# Patient Record
Sex: FEMALE | Race: WHITE | NOT HISPANIC OR LATINO | Employment: FULL TIME | URBAN - METROPOLITAN AREA
[De-identification: names, ages, dates, MRNs, and addresses within clinical notes are randomized per-mention and may not be internally consistent; named-entity substitution may affect disease eponyms.]

---

## 2018-05-14 ENCOUNTER — HOSPITAL ENCOUNTER (EMERGENCY)
Facility: HOSPITAL | Age: 58
Discharge: HOME | End: 2018-05-14
Attending: EMERGENCY MEDICINE

## 2018-05-14 ENCOUNTER — APPOINTMENT (EMERGENCY)
Dept: RADIOLOGY | Facility: HOSPITAL | Age: 58
End: 2018-05-14

## 2018-05-14 VITALS
HEART RATE: 48 BPM | RESPIRATION RATE: 24 BRPM | BODY MASS INDEX: 21.97 KG/M2 | DIASTOLIC BLOOD PRESSURE: 58 MMHG | HEIGHT: 67 IN | SYSTOLIC BLOOD PRESSURE: 120 MMHG | WEIGHT: 140 LBS | OXYGEN SATURATION: 97 % | TEMPERATURE: 98.2 F

## 2018-05-14 DIAGNOSIS — J40 BRONCHITIS: Primary | ICD-10-CM

## 2018-05-14 LAB
ALBUMIN SERPL BCP-MCNC: 3.8 G/DL (ref 3.5–5)
ALP SERPL-CCNC: 92 U/L (ref 46–116)
ALT SERPL W P-5'-P-CCNC: 13 U/L (ref 12–78)
ANION GAP SERPL CALCULATED.3IONS-SCNC: 10 MMOL/L (ref 4–13)
AST SERPL W P-5'-P-CCNC: 22 U/L (ref 5–45)
BACTERIA UR QL AUTO: NORMAL /HPF
BASOPHILS # BLD AUTO: 0.03 THOUSANDS/ΜL (ref 0–0.1)
BASOPHILS NFR BLD AUTO: 0 % (ref 0–1)
BILIRUB SERPL-MCNC: 0.6 MG/DL (ref 0.2–1)
BILIRUB UR QL STRIP: NEGATIVE
BUN SERPL-MCNC: 10 MG/DL (ref 5–25)
CALCIUM SERPL-MCNC: 8.9 MG/DL (ref 8.3–10.1)
CHLORIDE SERPL-SCNC: 103 MMOL/L (ref 100–108)
CLARITY UR: CLEAR
CO2 SERPL-SCNC: 26 MMOL/L (ref 21–32)
COLOR UR: YELLOW
CREAT SERPL-MCNC: 0.78 MG/DL (ref 0.6–1.3)
DEPRECATED D DIMER PPP: 352 NG/ML (FEU) (ref 0–424)
EOSINOPHIL # BLD AUTO: 0.1 THOUSAND/ΜL (ref 0–0.61)
EOSINOPHIL NFR BLD AUTO: 2 % (ref 0–6)
ERYTHROCYTE [DISTWIDTH] IN BLOOD BY AUTOMATED COUNT: 12.8 % (ref 11.6–15.1)
GFR SERPL CREATININE-BSD FRML MDRD: 83 ML/MIN/1.73SQ M
GLUCOSE SERPL-MCNC: 101 MG/DL (ref 65–140)
GLUCOSE UR STRIP-MCNC: NEGATIVE MG/DL
HCT VFR BLD AUTO: 46 % (ref 34.8–46.1)
HGB BLD-MCNC: 15.6 G/DL (ref 11.5–15.4)
HGB UR QL STRIP.AUTO: ABNORMAL
KETONES UR STRIP-MCNC: NEGATIVE MG/DL
LEUKOCYTE ESTERASE UR QL STRIP: NEGATIVE
LYMPHOCYTES # BLD AUTO: 1.43 THOUSANDS/ΜL (ref 0.6–4.47)
LYMPHOCYTES NFR BLD AUTO: 21 % (ref 14–44)
MCH RBC QN AUTO: 30.1 PG (ref 26.8–34.3)
MCHC RBC AUTO-ENTMCNC: 33.9 G/DL (ref 31.4–37.4)
MCV RBC AUTO: 89 FL (ref 82–98)
MONOCYTES # BLD AUTO: 0.56 THOUSAND/ΜL (ref 0.17–1.22)
MONOCYTES NFR BLD AUTO: 8 % (ref 4–12)
NEUTROPHILS # BLD AUTO: 4.57 THOUSANDS/ΜL (ref 1.85–7.62)
NEUTS SEG NFR BLD AUTO: 69 % (ref 43–75)
NITRITE UR QL STRIP: NEGATIVE
NON-SQ EPI CELLS URNS QL MICRO: NORMAL /HPF
NT-PROBNP SERPL-MCNC: 144 PG/ML
PH UR STRIP.AUTO: 6.5 [PH] (ref 4.5–8)
PLATELET # BLD AUTO: 227 THOUSANDS/UL (ref 149–390)
PMV BLD AUTO: 10.5 FL (ref 8.9–12.7)
POTASSIUM SERPL-SCNC: 4.3 MMOL/L (ref 3.5–5.3)
PROT SERPL-MCNC: 7.5 G/DL (ref 6.4–8.2)
PROT UR STRIP-MCNC: NEGATIVE MG/DL
RBC # BLD AUTO: 5.18 MILLION/UL (ref 3.81–5.12)
RBC #/AREA URNS AUTO: NORMAL /HPF
SODIUM SERPL-SCNC: 139 MMOL/L (ref 136–145)
SP GR UR STRIP.AUTO: <=1.005 (ref 1–1.03)
TROPONIN I SERPL-MCNC: <0.02 NG/ML
TROPONIN I SERPL-MCNC: <0.02 NG/ML
UROBILINOGEN UR QL STRIP.AUTO: 0.2 E.U./DL
WBC # BLD AUTO: 6.69 THOUSAND/UL (ref 4.31–10.16)
WBC #/AREA URNS AUTO: NORMAL /HPF

## 2018-05-14 PROCEDURE — 71046 X-RAY EXAM CHEST 2 VIEWS: CPT

## 2018-05-14 PROCEDURE — 36415 COLL VENOUS BLD VENIPUNCTURE: CPT | Performed by: EMERGENCY MEDICINE

## 2018-05-14 PROCEDURE — 93005 ELECTROCARDIOGRAM TRACING: CPT

## 2018-05-14 PROCEDURE — 80053 COMPREHEN METABOLIC PANEL: CPT | Performed by: EMERGENCY MEDICINE

## 2018-05-14 PROCEDURE — 81001 URINALYSIS AUTO W/SCOPE: CPT | Performed by: EMERGENCY MEDICINE

## 2018-05-14 PROCEDURE — 84484 ASSAY OF TROPONIN QUANT: CPT | Performed by: EMERGENCY MEDICINE

## 2018-05-14 PROCEDURE — 83880 ASSAY OF NATRIURETIC PEPTIDE: CPT | Performed by: EMERGENCY MEDICINE

## 2018-05-14 PROCEDURE — 85025 COMPLETE CBC W/AUTO DIFF WBC: CPT | Performed by: EMERGENCY MEDICINE

## 2018-05-14 PROCEDURE — 94640 AIRWAY INHALATION TREATMENT: CPT

## 2018-05-14 PROCEDURE — 85379 FIBRIN DEGRADATION QUANT: CPT | Performed by: EMERGENCY MEDICINE

## 2018-05-14 PROCEDURE — 99285 EMERGENCY DEPT VISIT HI MDM: CPT

## 2018-05-14 RX ORDER — IPRATROPIUM BROMIDE AND ALBUTEROL SULFATE 2.5; .5 MG/3ML; MG/3ML
3 SOLUTION RESPIRATORY (INHALATION) ONCE
Status: COMPLETED | OUTPATIENT
Start: 2018-05-14 | End: 2018-05-14

## 2018-05-14 RX ORDER — ALBUTEROL SULFATE 2.5 MG/3ML
5 SOLUTION RESPIRATORY (INHALATION) ONCE
Status: COMPLETED | OUTPATIENT
Start: 2018-05-14 | End: 2018-05-14

## 2018-05-14 RX ORDER — ALBUTEROL SULFATE 90 UG/1
2 AEROSOL, METERED RESPIRATORY (INHALATION) EVERY 4 HOURS PRN
Qty: 1 INHALER | Refills: 0 | Status: SHIPPED | OUTPATIENT
Start: 2018-05-14 | End: 2019-12-02 | Stop reason: SDDI

## 2018-05-14 RX ORDER — ASPIRIN 81 MG/1
324 TABLET, CHEWABLE ORAL ONCE
Status: COMPLETED | OUTPATIENT
Start: 2018-05-14 | End: 2018-05-14

## 2018-05-14 RX ORDER — SODIUM CHLORIDE FOR INHALATION 0.9 %
VIAL, NEBULIZER (ML) INHALATION
Status: COMPLETED
Start: 2018-05-14 | End: 2018-05-14

## 2018-05-14 RX ORDER — LORATADINE 10 MG/1
10 TABLET ORAL DAILY
Qty: 20 TABLET | Refills: 0 | Status: SHIPPED | OUTPATIENT
Start: 2018-05-14 | End: 2019-12-02 | Stop reason: ALTCHOICE

## 2018-05-14 RX ORDER — LORATADINE 10 MG/1
10 TABLET ORAL ONCE
Status: COMPLETED | OUTPATIENT
Start: 2018-05-14 | End: 2018-05-14

## 2018-05-14 RX ADMIN — ISODIUM CHLORIDE 3 ML: 0.03 SOLUTION RESPIRATORY (INHALATION) at 07:20

## 2018-05-14 RX ADMIN — IPRATROPIUM BROMIDE AND ALBUTEROL SULFATE 3 ML: 2.5; .5 SOLUTION RESPIRATORY (INHALATION) at 07:20

## 2018-05-14 RX ADMIN — ALBUTEROL SULFATE 5 MG: 2.5 SOLUTION RESPIRATORY (INHALATION) at 10:39

## 2018-05-14 RX ADMIN — LORATADINE 10 MG: 10 TABLET ORAL at 10:39

## 2018-05-14 RX ADMIN — ASPIRIN 81 MG 324 MG: 81 TABLET ORAL at 07:19

## 2018-05-14 NOTE — DISCHARGE INSTRUCTIONS
Acute Bronchitis   WHAT YOU NEED TO KNOW:   Acute bronchitis is swelling and irritation in the air passages of your lungs  This irritation may cause you to cough or have other breathing problems  Acute bronchitis often starts because of another illness, such as a cold or the flu  The illness spreads from your nose and throat to your windpipe and airways  Bronchitis is often called a chest cold  Acute bronchitis lasts about 3 to 6 weeks and is usually not a serious illness  Your cough can last for several weeks  DISCHARGE INSTRUCTIONS:   Return to the emergency department if:   · You cough up blood  · Your lips or fingernails turn blue  · You feel like you are not getting enough air when you breathe  Contact your healthcare provider if:   · You have a fever  · Your breathing problems do not go away or get worse  · Your cough does not get better within 4 weeks  · You have questions or concerns about your condition or care  Self-care:   · Get more rest   Rest helps your body to heal  Slowly start to do more each day  Rest when you feel it is needed  · Avoid irritants in the air  Avoid chemicals, fumes, and dust  Wear a face mask if you must work around dust or fumes  Stay inside on days when air pollution levels are high  If you have allergies, stay inside when pollen counts are high  Do not use aerosol products, such as spray-on deodorant, bug spray, and hair spray  · Do not smoke or be around others who smoke  Nicotine and other chemicals in cigarettes and cigars damages the cilia that move mucus out of your lungs  Ask your healthcare provider for information if you currently smoke and need help to quit  E-cigarettes or smokeless tobacco still contain nicotine  Talk to your healthcare provider before you use these products  · Drink liquids as directed  Liquids help keep your air passages moist and help you cough up mucus   You may need to drink more liquids when you have acute bronchitis  Ask how much liquid to drink each day and which liquids are best for you  · Use a humidifier or vaporizer  Use a cool mist humidifier or a vaporizer to increase air moisture in your home  This may make it easier for you to breathe and help decrease your cough  Decrease risk for acute bronchitis:   · Get the vaccinations you need  Ask your healthcare provider if you should get vaccinated against the flu or pneumonia  · Prevent the spread of germs  You can decrease your risk of acute bronchitis and other illnesses by doing the following:     [facility] your hands often with soap and water  Carry germ-killing hand lotion or gel with you  You can use the lotion or gel to clean your hands when soap and water are not available  ¨ Do not touch your eyes, nose, or mouth unless you have washed your hands first     ¨ Always cover your mouth when you cough to prevent the spread of germs  It is best to cough into a tissue or your shirt sleeve instead of into your hand  Ask those around you cover their mouths when they cough  ¨ Try to avoid people who have a cold or the flu  If you are sick, stay away from others as much as possible  Medicines: Your healthcare provider may  give you any of the following:  · Ibuprofen or acetaminophen  are medicines that help lower your fever  They are available without a doctor's order  Ask your healthcare provider which medicine is right for you  Ask how much to take and how often to take it  Follow directions  These medicines can cause stomach bleeding if not taken correctly  Ibuprofen can cause kidney damage  Do not take ibuprofen if you have kidney disease, an ulcer, or allergies to aspirin  Acetaminophen can cause liver damage  Do not take more than 4,000 milligrams in 24 hours  · Decongestants  help loosen mucus in your lungs and make it easier to cough up  This can help you breathe easier  · Cough suppressants  decrease your urge to cough   If your cough produces mucus, do not take a cough suppressant unless your healthcare provider tells you to  Your healthcare provider may suggest that you take a cough suppressant at night so you can rest     · Inhalers  may be given  Your healthcare provider may give you one or more inhalers to help you breathe easier and cough less  An inhaler gives your medicine to open your airways  Ask your healthcare provider to show you how to use your inhaler correctly  · Take your medicine as directed  Contact your healthcare provider if you think your medicine is not helping or if you have side effects  Tell him of her if you are allergic to any medicine  Keep a list of the medicines, vitamins, and herbs you take  Include the amounts, and when and why you take them  Bring the list or the pill bottles to follow-up visits  Carry your medicine list with you in case of an emergency  Follow up with your healthcare provider as directed:  Write down questions you have so you will remember to ask them during your follow-up visits  © 2017 [employer] Information is for End User's use only and may not be sold, redistributed or otherwise used for commercial purposes  All illustrations and images included in CareNotes® are the copyrighted property of [employer]  or Fredy Medeiros  The above information is an  only  It is not intended as medical advice for individual conditions or treatments  Talk to your doctor, nurse or pharmacist before following any medical regimen to see if it is safe and effective for you  Cigarette Smoking and Your Health   WHAT YOU NEED TO KNOW:   What are the risks to my health if I smoke tobacco?  Nicotine and other chemicals found in tobacco damage every cell in your body  Even if you are a light smoker, you have an increased risk for cancer, heart disease, and lung disease  If you are pregnant or have diabetes, smoking increases your risk for complications  What are the benefits to my health if I stop smoking? · You decrease respiratory symptoms such as coughing, wheezing, and shortness of breath  · You reduce your risk for cancers of the lung, mouth, throat, kidney, bladder, pancreas, stomach, and cervix  If you already have cancer, you increase the benefits of chemotherapy  You also reduce your risk for cancer returning or a second cancer from developing  · You reduce your risk for heart disease, blood clots, heart attack, and stroke  · You reduce your risk for lung infections, and diseases such as pneumonia, asthma, chronic bronchitis, and emphysema  · Your circulation improves  More oxygen can be delivered to your body  If you have diabetes, you lower your risk for complications, such as kidney, artery, and eye diseases  You also lower your risk for nerve damage  Nerve damage can lead to amputations, poor vision, and blindness  · You improve your body's ability to heal and to fight infections  What are the health benefits to others if I stop smoking? Tobacco is harmful to nonsmokers who breathe in your secondhand smoke  The following are ways the health of others around you may improve when you stop smoking:  · You lower the risks for lung cancer and heart disease in nonsmoking adults  · If you are pregnant, you lower the risk for miscarriage, early delivery, low birth weight, and stillbirth  You also lower your baby's risk for SIDS, obesity, developmental delay, and neurobehavioral problems, such as ADHD  · If you have children, you lower their risk for ear infections, colds, pneumonia, bronchitis, and asthma  Where can I find more information and support to stop smoking? · Locality  Phone: [phone]  Web Address: www [employer]  CARE AGREEMENT:   You have the right to help plan your care  Learn about your health condition and how it may be treated   Discuss treatment options with your caregivers to decide what care you want to receive  You always have the right to refuse treatment  The above information is an  only  It is not intended as medical advice for individual conditions or treatments  Talk to your doctor, nurse or pharmacist before following any medical regimen to see if it is safe and effective for you  © 2017 [employer] Information is for End User's use only and may not be sold, redistributed or otherwise used for commercial purposes  All illustrations and images included in CareNotes® are the copyrighted property of [employer]  or Fredy Medeiros

## 2018-05-14 NOTE — ED PROVIDER NOTES
History  Chief Complaint   Patient presents with    Chest Pain     patient presents to ED c/o of chest pain and coughing x 1 week  Woke up this am and felt like she had a pressure on her chest      Patient complains of dry cough short of breath 1 week duration then woke this morning with squeezing chest pain around ribs  No specific exacerbating or relieving factors  Did not take any medications yet  History provided by:  Patient  Chest Pain   Pain location:  L lateral chest and R lateral chest  Pain quality: pressure    Pain severity:  Mild  Onset quality:  Sudden  Timing:  Constant  Chronicity:  New  Context: breathing    Relieved by:  Nothing  Worsened by:  Nothing tried  Associated symptoms: shortness of breath    Associated symptoms: no fever, no nausea and not vomiting    Risk factors: smoking        None       History reviewed  No pertinent past medical history  History reviewed  No pertinent surgical history  History reviewed  No pertinent family history  I have reviewed and agree with the history as documented  Social History   Substance Use Topics    Smoking status: Current Every Day Smoker    Smokeless tobacco: Never Used    Alcohol use No        Review of Systems   Constitutional: Negative for chills and fever  HENT: Negative for rhinorrhea and sore throat  Respiratory: Positive for shortness of breath  Cardiovascular: Positive for chest pain  Gastrointestinal: Positive for constipation  Negative for diarrhea, nausea and vomiting  Genitourinary: Positive for dysuria, frequency and urgency  Skin: Negative for rash  All other systems reviewed and are negative        Physical Exam  ED Triage Vitals   Temperature Pulse Respirations Blood Pressure SpO2   05/14/18 0717 05/14/18 0702 05/14/18 0702 05/14/18 0702 05/14/18 0702   98 2 °F (36 8 °C) 64 20 (!) 199/92 98 %      Temp Source Heart Rate Source Patient Position - Orthostatic VS BP Location FiO2 (%)   05/14/18 0717 -- 05/14/18 0702 05/14/18 0702 --   Temporal  Sitting Left arm       Pain Score       --                  Orthostatic Vital Signs  Vitals:    05/14/18 0930 05/14/18 0945 05/14/18 1000 05/14/18 1015   BP:    120/58   Pulse: (!) 47 (!) 47 56 (!) 48   Patient Position - Orthostatic VS:           Physical Exam   Constitutional: She appears well-developed and well-nourished  HENT:   Mouth/Throat: Oropharynx is clear and moist    Eyes: Conjunctivae and EOM are normal  Pupils are equal, round, and reactive to light  Neck: Normal range of motion  Neck supple  No spinous process tenderness present  Cardiovascular: Normal rate, regular rhythm, normal heart sounds and intact distal pulses  Pulmonary/Chest: Effort normal and breath sounds normal  No respiratory distress  She has no wheezes  Abdominal: Soft  Bowel sounds are normal  She exhibits no distension  There is no tenderness  Musculoskeletal: Normal range of motion  Neurological: She is alert  She has normal strength  No sensory deficit  GCS eye subscore is 4  GCS verbal subscore is 5  GCS motor subscore is 6  Skin: Skin is warm and dry  No rash noted  Psychiatric: She has a normal mood and affect  Nursing note and vitals reviewed        ED Medications  Medications   aspirin chewable tablet 324 mg (324 mg Oral Given 5/14/18 0719)   ipratropium-albuterol (DUO-NEB) 0 5-2 5 mg/3 mL inhalation solution 3 mL (3 mL Nebulization Given 5/14/18 0720)   sodium chloride 0 9 % inhalation solution **AcuDose Override Pull** (3 mL  Given 5/14/18 0720)   loratadine (CLARITIN) tablet 10 mg (10 mg Oral Given 5/14/18 1039)   albuterol inhalation solution 5 mg (5 mg Nebulization Given 5/14/18 1039)       Diagnostic Studies  Results Reviewed     Procedure Component Value Units Date/Time    Troponin I [[id number]]  (Normal) Collected:  05/14/18 1003    Lab Status:  Final result Specimen:  Blood from Arm, Right Updated:  05/14/18 1032     Troponin I <0 02 ng/mL     Narrative: Siemens Chemistry analyzer 99% cutoff is > 0 04 ng/mL in network labs    o cTnI 99% cutoff is useful only when applied to patients in the clinical setting of myocardial ischemia  o cTnI 99% cutoff should be interpreted in the context of clinical history, ECG findings and possibly cardiac imaging to establish correct diagnosis  o cTnI 99% cutoff may be suggestive but clearly not indicative of a coronary event without the clinical setting of myocardial ischemia      Urine Microscopic [[id number]]  (Normal) Collected:  05/14/18 0827    Lab Status:  Final result Specimen:  Urine from Urine, Clean Catch Updated:  05/14/18 0845     RBC, UA None Seen /hpf      WBC, UA None Seen /hpf      Epithelial Cells Occasional /hpf      Bacteria, UA None Seen /hpf     UA w Reflex to Microscopic w Reflex to Culture [[id number]]  (Abnormal) Collected:  05/14/18 0827    Lab Status:  Final result Specimen:  Urine from Urine, Clean Catch Updated:  05/14/18 0838     Color, UA Yellow     Clarity, UA Clear     Specific Gravity, UA <=1 005     pH, UA 6 5     Leukocytes, UA Negative     Nitrite, UA Negative     Protein, UA Negative mg/dl      Glucose, UA Negative mg/dl      Ketones, UA Negative mg/dl      Urobilinogen, UA 0 2 E U /dl      Bilirubin, UA Negative     Blood, UA Trace-lysed (A)    D-dimer, quantitative [[id number]]  (Normal) Collected:  05/14/18 0720    Lab Status:  Final result Specimen:  Blood from Arm, Right Updated:  05/14/18 0830     D-Dimer, Quant 352 ng/ml (FEU)     B-type natriuretic peptide [[id number]]  (Abnormal) Collected:  05/14/18 0706    Lab Status:  Final result Specimen:  Blood from Arm, Right Updated:  05/14/18 0752     NT-proBNP 144 (H) pg/mL     Troponin I [[id number]]  (Normal) Collected:  05/14/18 0706    Lab Status:  Final result Specimen:  Blood from Arm, Right Updated:  05/14/18 0749     Troponin I <0 02 ng/mL     Narrative:         Siemens Chemistry analyzer 99% cutoff is > 0 04 ng/mL in network labs    o cTnI 99% cutoff is useful only when applied to patients in the clinical setting of myocardial ischemia  o cTnI 99% cutoff should be interpreted in the context of clinical history, ECG findings and possibly cardiac imaging to establish correct diagnosis  o cTnI 99% cutoff may be suggestive but clearly not indicative of a coronary event without the clinical setting of myocardial ischemia  Comprehensive metabolic panel [[id number]] Collected:  05/14/18 0706    Lab Status:  Final result Specimen:  Blood from Arm, Right Updated:  05/14/18 0747     Sodium 139 mmol/L      Potassium 4 3 mmol/L      Chloride 103 mmol/L      CO2 26 mmol/L      Anion Gap 10 mmol/L      BUN 10 mg/dL      Creatinine 0 78 mg/dL      Glucose 101 mg/dL      Calcium 8 9 mg/dL      AST 22 U/L      ALT 13 U/L      Alkaline Phosphatase 92 U/L      Total Protein 7 5 g/dL      Albumin 3 8 g/dL      Total Bilirubin 0 60 mg/dL      eGFR 83 ml/min/1 73sq m     Narrative:         National Kidney Disease Education Program recommendations are as follows:  GFR calculation is accurate only with a steady state creatinine  Chronic Kidney disease less than 60 ml/min/1 73 sq  meters  Kidney failure less than 15 ml/min/1 73 sq  meters      CBC and differential [[id number]]  (Abnormal) Collected:  05/14/18 0706    Lab Status:  Final result Specimen:  Blood from Arm, Right Updated:  05/14/18 0733     WBC 6 69 Thousand/uL      RBC 5 18 (H) Million/uL      Hemoglobin 15 6 (H) g/dL      Hematocrit 46 0 %      MCV 89 fL      MCH 30 1 pg      MCHC 33 9 g/dL      RDW 12 8 %      MPV 10 5 fL      Platelets 178 Thousands/uL      Neutrophils Relative 69 %      Lymphocytes Relative 21 %      Monocytes Relative 8 %      Eosinophils Relative 2 %      Basophils Relative 0 %      Neutrophils Absolute 4 57 Thousands/µL      Lymphocytes Absolute 1 43 Thousands/µL      Monocytes Absolute 0 56 Thousand/µL      Eosinophils Absolute 0 10 Thousand/µL      Basophils Absolute 0 03 Thousands/µL                  X-ray chest 2 views   ED Interpretation by Ector Ashton DO (05/14 8385)   hyperinflation      Final Result by Chayo Brooks MD (05/14 4152)   Hyperinflation suspicious for COPD      No acute cardiopulmonary disease  Workstation performed: [id number]                    Procedures  ECG 12 Lead Documentation  Date/Time: 5/14/2018 7:23 AM  Performed by: Ame Galeas by: Sheryl Huitron     Indications / Diagnosis:  Chest pain  ECG reviewed by me, the ED Provider: yes    Patient location:  ED  Previous ECG:     Previous ECG:  Unavailable  Interpretation:     Interpretation: normal    Rate:     ECG rate:  64    ECG rate assessment: normal    Rhythm:     Rhythm: sinus rhythm    Ectopy:     Ectopy: none    QRS:     QRS axis:  Normal    QRS intervals:  Normal  Conduction:     Conduction: normal    ST segments:     ST segments:  Normal  T waves:     T waves: normal             Phone Contacts  ED Phone Contact    ED Course  ED Course as of May 14 2047   Mon May 14, 2018   0739 Smoking cessation counseling provided - suggested nicotine gum instead of patch    0750 Patient feels better after breathing treatment    0845 BP decreased to 135/65  Will do repeat troponin at 3 hours to confirm not cardiac episode    More likely allergic bronchitis          HEART Risk Score      Most Recent Value   History  1 Filed at: 05/14/2018 0719   ECG  0 Filed at: 05/14/2018 0719   Age  1 Filed at: 05/14/2018 0719   Risk Factors  1 Filed at: 05/14/2018 0719   Troponin  0 Filed at: 05/14/2018 0719   Heart Score Risk Calculator   History  1 Filed at: 05/14/2018 0719   ECG  0 Filed at: 05/14/2018 0719   Age  1 Filed at: 05/14/2018 0719   Risk Factors  1 Filed at: 05/14/2018 0719   Troponin  0 Filed at: 05/14/2018 0719   HEART Score  3 Filed at: 05/14/2018 0719   HEART Score  3 Filed at: 05/14/2018 7291                      Wells' Criteria for PE      Most Recent Value   Wells' Criteria for PE Clinical signs and symptoms of DVT  0 Filed at: 05/14/2018 6432   PE is primary diagnosis or equally likely  0 Filed at: 05/14/2018 0721   HR >100  0 Filed at: 05/14/2018 0721   Immobilization at least 3 days or Surgery in the previous 4 weeks  0 Filed at: 05/14/2018 0955   Previous, objectively diagnosed PE or DVT  0 Filed at: 05/14/2018 0721   Hemoptysis  0 Filed at: 05/14/2018 7134   Malignancy with treatment within 6 months or palliative  0 Filed at: 05/14/2018 2688   Wells' Criteria Total  0 Filed at: 05/14/2018 1091            MDM  Number of Diagnoses or Management Options  Bronchitis: new and requires workup     Amount and/or Complexity of Data Reviewed  Clinical lab tests: ordered and reviewed  Tests in the radiology section of CPT®: ordered and reviewed    Patient Progress  Patient progress: improved    CritCare Time    Disposition  Final diagnoses:   Bronchitis     Time reflects when diagnosis was documented in both MDM as applicable and the Disposition within this note     Time User Action Codes Description Comment    5/14/2018 10:37 AM Adilson [address] [place] [address] Bronchitis       ED Disposition     ED Disposition Condition Comment    Discharge  Aldair Padilla discharge to home/self care  Condition at discharge: Good        Follow-up Information     Follow up With Specialties Details Why Ravi Mendez MD Family Medicine In 7 days As needed [address]          Discharge Medication List as of 5/14/2018 10:38 AM      START taking these medications    Details   albuterol (PROVENTIL HFA,VENTOLIN HFA) 90 mcg/act inhaler Inhale 2 puffs every 4 (four) hours as needed for wheezing, Starting Mon 5/14/2018, Normal      loratadine (CLARITIN) 10 mg tablet Take 1 tablet (10 mg total) by mouth daily, Starting Mon 5/14/2018, Normal           No discharge procedures on file      ED Provider  Electronically Signed by           Mayo Spring DO  05/14/18 2047

## 2018-05-15 LAB
ATRIAL RATE: 64 BPM
P AXIS: 80 DEGREES
PR INTERVAL: 156 MS
QRS AXIS: 24 DEGREES
QRSD INTERVAL: 84 MS
QT INTERVAL: 400 MS
QTC INTERVAL: 412 MS
T WAVE AXIS: 65 DEGREES
VENTRICULAR RATE: 64 BPM

## 2018-05-15 PROCEDURE — 93010 ELECTROCARDIOGRAM REPORT: CPT | Performed by: INTERNAL MEDICINE

## 2019-12-02 ENCOUNTER — APPOINTMENT (EMERGENCY)
Dept: RADIOLOGY | Facility: HOSPITAL | Age: 59
DRG: 315 | End: 2019-12-02
Payer: COMMERCIAL

## 2019-12-02 ENCOUNTER — HOSPITAL ENCOUNTER (INPATIENT)
Facility: HOSPITAL | Age: 59
LOS: 3 days | Discharge: HOME | DRG: 315 | End: 2019-12-06
Attending: EMERGENCY MEDICINE | Admitting: INTERNAL MEDICINE
Payer: COMMERCIAL

## 2019-12-02 DIAGNOSIS — M75.52 SUBDELTOID BURSITIS OF LEFT SHOULDER JOINT: ICD-10-CM

## 2019-12-02 DIAGNOSIS — M00.9 PYOGENIC ARTHRITIS OF LEFT SHOULDER REGION, DUE TO UNSPECIFIED ORGANISM (HCC): ICD-10-CM

## 2019-12-02 DIAGNOSIS — M00.9 JOINT INFECTION (HCC): ICD-10-CM

## 2019-12-02 DIAGNOSIS — Z72.0 TOBACCO USE: ICD-10-CM

## 2019-12-02 DIAGNOSIS — M75.52 SUBACROMIAL BURSITIS OF LEFT SHOULDER JOINT: Primary | ICD-10-CM

## 2019-12-02 LAB
ANION GAP SERPL CALCULATED.3IONS-SCNC: 11 MMOL/L (ref 4–13)
BASOPHILS # BLD AUTO: 0.04 THOUSANDS/ΜL (ref 0–0.1)
BASOPHILS NFR BLD AUTO: 0 % (ref 0–1)
BUN SERPL-MCNC: 23 MG/DL (ref 5–25)
CALCIUM SERPL-MCNC: 9.2 MG/DL (ref 8.3–10.1)
CHLORIDE SERPL-SCNC: 103 MMOL/L (ref 100–108)
CK SERPL-CCNC: 68 U/L (ref 26–192)
CO2 SERPL-SCNC: 26 MMOL/L (ref 21–32)
CREAT SERPL-MCNC: 1.11 MG/DL (ref 0.6–1.3)
CRP SERPL QL: 11.3 MG/L
EOSINOPHIL # BLD AUTO: 0.12 THOUSAND/ΜL (ref 0–0.61)
EOSINOPHIL NFR BLD AUTO: 1 % (ref 0–6)
ERYTHROCYTE [DISTWIDTH] IN BLOOD BY AUTOMATED COUNT: 12.8 % (ref 11.6–15.1)
GFR SERPL CREATININE-BSD FRML MDRD: 54 ML/MIN/1.73SQ M
GLUCOSE SERPL-MCNC: 102 MG/DL (ref 65–140)
HCT VFR BLD AUTO: 41.8 % (ref 34.8–46.1)
HGB BLD-MCNC: 13.9 G/DL (ref 11.5–15.4)
IMM GRANULOCYTES # BLD AUTO: 0.03 THOUSAND/UL (ref 0–0.2)
IMM GRANULOCYTES NFR BLD AUTO: 0 % (ref 0–2)
LYMPHOCYTES # BLD AUTO: 1.46 THOUSANDS/ΜL (ref 0.6–4.47)
LYMPHOCYTES NFR BLD AUTO: 11 % (ref 14–44)
MCH RBC QN AUTO: 29.8 PG (ref 26.8–34.3)
MCHC RBC AUTO-ENTMCNC: 33.3 G/DL (ref 31.4–37.4)
MCV RBC AUTO: 90 FL (ref 82–98)
MONOCYTES # BLD AUTO: 1.27 THOUSAND/ΜL (ref 0.17–1.22)
MONOCYTES NFR BLD AUTO: 10 % (ref 4–12)
NEUTROPHILS # BLD AUTO: 10.35 THOUSANDS/ΜL (ref 1.85–7.62)
NEUTS SEG NFR BLD AUTO: 78 % (ref 43–75)
PLATELET # BLD AUTO: 255 THOUSANDS/UL (ref 149–390)
PMV BLD AUTO: 10 FL (ref 8.9–12.7)
POTASSIUM SERPL-SCNC: 4 MMOL/L (ref 3.5–5.3)
RBC # BLD AUTO: 4.66 MILLION/UL (ref 3.81–5.12)
SODIUM SERPL-SCNC: 140 MMOL/L (ref 136–145)
TROPONIN I SERPL-MCNC: <0.02 NG/ML
WBC # BLD AUTO: 13.27 THOUSAND/UL (ref 4.31–10.16)

## 2019-12-02 PROCEDURE — 86140 C-REACTIVE PROTEIN: CPT | Performed by: EMERGENCY MEDICINE

## 2019-12-02 PROCEDURE — 99285 EMERGENCY DEPT VISIT HI MDM: CPT

## 2019-12-02 PROCEDURE — 93005 ELECTROCARDIOGRAM TRACING: CPT

## 2019-12-02 PROCEDURE — 71046 X-RAY EXAM CHEST 2 VIEWS: CPT

## 2019-12-02 PROCEDURE — 73030 X-RAY EXAM OF SHOULDER: CPT

## 2019-12-02 PROCEDURE — 85652 RBC SED RATE AUTOMATED: CPT | Performed by: EMERGENCY MEDICINE

## 2019-12-02 PROCEDURE — ND001 PR NO DOCUMENTATION: Performed by: EMERGENCY MEDICINE

## 2019-12-02 PROCEDURE — 80048 BASIC METABOLIC PNL TOTAL CA: CPT | Performed by: EMERGENCY MEDICINE

## 2019-12-02 PROCEDURE — 96374 THER/PROPH/DIAG INJ IV PUSH: CPT

## 2019-12-02 PROCEDURE — 85025 COMPLETE CBC W/AUTO DIFF WBC: CPT | Performed by: EMERGENCY MEDICINE

## 2019-12-02 PROCEDURE — 84484 ASSAY OF TROPONIN QUANT: CPT | Performed by: EMERGENCY MEDICINE

## 2019-12-02 PROCEDURE — 99285 EMERGENCY DEPT VISIT HI MDM: CPT | Performed by: EMERGENCY MEDICINE

## 2019-12-02 PROCEDURE — 82550 ASSAY OF CK (CPK): CPT | Performed by: EMERGENCY MEDICINE

## 2019-12-02 PROCEDURE — 36415 COLL VENOUS BLD VENIPUNCTURE: CPT | Performed by: EMERGENCY MEDICINE

## 2019-12-02 RX ORDER — KETOROLAC TROMETHAMINE 30 MG/ML
15 INJECTION, SOLUTION INTRAMUSCULAR; INTRAVENOUS ONCE
Status: COMPLETED | OUTPATIENT
Start: 2019-12-02 | End: 2019-12-02

## 2019-12-02 RX ORDER — ACETAMINOPHEN 325 MG/1
975 TABLET ORAL ONCE
Status: COMPLETED | OUTPATIENT
Start: 2019-12-02 | End: 2019-12-02

## 2019-12-02 RX ADMIN — KETOROLAC TROMETHAMINE 15 MG: 30 INJECTION, SOLUTION INTRAMUSCULAR at 23:10

## 2019-12-02 RX ADMIN — ACETAMINOPHEN 975 MG: 325 TABLET, FILM COATED ORAL at 23:09

## 2019-12-03 ENCOUNTER — ANESTHESIA (OUTPATIENT)
Dept: PERIOP | Facility: HOSPITAL | Age: 59
DRG: 315 | End: 2019-12-03
Payer: COMMERCIAL

## 2019-12-03 ENCOUNTER — APPOINTMENT (EMERGENCY)
Dept: CT IMAGING | Facility: HOSPITAL | Age: 59
DRG: 315 | End: 2019-12-03
Payer: COMMERCIAL

## 2019-12-03 ENCOUNTER — ANESTHESIA EVENT (OUTPATIENT)
Dept: PERIOP | Facility: HOSPITAL | Age: 59
DRG: 315 | End: 2019-12-03
Payer: COMMERCIAL

## 2019-12-03 PROBLEM — M00.9 PYOGENIC ARTHRITIS OF LEFT SHOULDER REGION (HCC): Status: ACTIVE | Noted: 2019-12-02

## 2019-12-03 PROBLEM — D72.829 LEUKOCYTOSIS: Status: ACTIVE | Noted: 2019-12-03

## 2019-12-03 PROBLEM — M00.9 JOINT INFECTION (HCC): Status: ACTIVE | Noted: 2019-12-03

## 2019-12-03 PROBLEM — R93.89 ABNORMAL FINDING ON CT SCAN: Status: ACTIVE | Noted: 2019-12-03

## 2019-12-03 PROBLEM — Z72.0 TOBACCO USE: Status: ACTIVE | Noted: 2019-12-03

## 2019-12-03 LAB
APPEARANCE FLD: ABNORMAL
ATRIAL RATE: 64 BPM
BASOPHILS # BLD AUTO: 0.03 THOUSANDS/ΜL (ref 0–0.1)
BASOPHILS NFR BLD AUTO: 0 % (ref 0–1)
COLOR FLD: ABNORMAL
CRYSTALS SNV QL MICRO: NORMAL
EOSINOPHIL # BLD AUTO: 0.16 THOUSAND/ΜL (ref 0–0.61)
EOSINOPHIL NFR BLD AUTO: 1 % (ref 0–6)
ERYTHROCYTE [DISTWIDTH] IN BLOOD BY AUTOMATED COUNT: 12.9 % (ref 11.6–15.1)
ERYTHROCYTE [SEDIMENTATION RATE] IN BLOOD: 3 MM/HOUR (ref 0–15)
HCT VFR BLD AUTO: 38.5 % (ref 34.8–46.1)
HCV AB SER QL: NORMAL
HGB BLD-MCNC: 12.7 G/DL (ref 11.5–15.4)
IMM GRANULOCYTES # BLD AUTO: 0.02 THOUSAND/UL (ref 0–0.2)
IMM GRANULOCYTES NFR BLD AUTO: 0 % (ref 0–2)
LYMPHOCYTES # BLD AUTO: 1.58 THOUSANDS/ΜL (ref 0.6–4.47)
LYMPHOCYTES NFR BLD AUTO: 14 % (ref 14–44)
MCH RBC QN AUTO: 30 PG (ref 26.8–34.3)
MCHC RBC AUTO-ENTMCNC: 33 G/DL (ref 31.4–37.4)
MCV RBC AUTO: 91 FL (ref 82–98)
MONOCYTES # BLD AUTO: 1.39 THOUSAND/ΜL (ref 0.17–1.22)
MONOCYTES NFR BLD AUTO: 12 % (ref 4–12)
MONONUC CELLS NFR SNV MANUAL: 4 %
NEUTROPHILS # BLD AUTO: 8.54 THOUSANDS/ΜL (ref 1.85–7.62)
NEUTROPHILS NFR SNV MANUAL: 96 %
NEUTS SEG NFR BLD AUTO: 73 % (ref 43–75)
P AXIS: -15 DEGREES
PLATELET # BLD AUTO: 219 THOUSANDS/UL (ref 149–390)
PMV BLD AUTO: 10.5 FL (ref 8.9–12.7)
PR INTERVAL: 144 MS
PROCALCITONIN SERPL-MCNC: 0.08 NG/ML
QRS AXIS: -1 DEGREES
QRSD INTERVAL: 94 MS
QT INTERVAL: 432 MS
QTC INTERVAL: 445 MS
RBC # BLD AUTO: 4.24 MILLION/UL (ref 3.81–5.12)
RBC # SNV MANUAL: ABNORMAL /UL (ref 0–10)
SITE: ABNORMAL
T WAVE AXIS: -6 DEGREES
TOTAL CELLS COUNTED SPEC: 100
VENTRICULAR RATE: 64 BPM
WBC # BLD AUTO: 11.72 THOUSAND/UL (ref 4.31–10.16)
WBC # FLD MANUAL: ABNORMAL /UL (ref 0–200)

## 2019-12-03 PROCEDURE — 89050 BODY FLUID CELL COUNT: CPT | Performed by: EMERGENCY MEDICINE

## 2019-12-03 PROCEDURE — 87205 SMEAR GRAM STAIN: CPT | Performed by: EMERGENCY MEDICINE

## 2019-12-03 PROCEDURE — 93010 ELECTROCARDIOGRAM REPORT: CPT | Performed by: INTERNAL MEDICINE

## 2019-12-03 PROCEDURE — 73200 CT UPPER EXTREMITY W/O DYE: CPT

## 2019-12-03 PROCEDURE — 87040 BLOOD CULTURE FOR BACTERIA: CPT | Performed by: INTERNAL MEDICINE

## 2019-12-03 PROCEDURE — 84145 PROCALCITONIN (PCT): CPT | Performed by: NURSE PRACTITIONER

## 2019-12-03 PROCEDURE — 86803 HEPATITIS C AB TEST: CPT | Performed by: INTERNAL MEDICINE

## 2019-12-03 PROCEDURE — 87205 SMEAR GRAM STAIN: CPT | Performed by: ORTHOPAEDIC SURGERY

## 2019-12-03 PROCEDURE — 29805 SHO ARTHRS DX +- SYNOVIAL BX: CPT | Performed by: ORTHOPAEDIC SURGERY

## 2019-12-03 PROCEDURE — 99220 PR INITIAL OBSERVATION CARE/DAY 70 MINUTES: CPT | Performed by: INTERNAL MEDICINE

## 2019-12-03 PROCEDURE — 87070 CULTURE OTHR SPECIMN AEROBIC: CPT | Performed by: EMERGENCY MEDICINE

## 2019-12-03 PROCEDURE — 89060 EXAM SYNOVIAL FLUID CRYSTALS: CPT | Performed by: EMERGENCY MEDICINE

## 2019-12-03 PROCEDURE — 87070 CULTURE OTHR SPECIMN AEROBIC: CPT | Performed by: ORTHOPAEDIC SURGERY

## 2019-12-03 PROCEDURE — 99254 IP/OBS CNSLTJ NEW/EST MOD 60: CPT | Performed by: ORTHOPAEDIC SURGERY

## 2019-12-03 PROCEDURE — 0R9K3ZZ DRAINAGE OF LEFT SHOULDER JOINT, PERCUTANEOUS APPROACH: ICD-10-PCS | Performed by: EMERGENCY MEDICINE

## 2019-12-03 PROCEDURE — 87176 TISSUE HOMOGENIZATION CULTR: CPT | Performed by: ORTHOPAEDIC SURGERY

## 2019-12-03 PROCEDURE — 85025 COMPLETE CBC W/AUTO DIFF WBC: CPT | Performed by: NURSE PRACTITIONER

## 2019-12-03 PROCEDURE — 0RNK4ZZ RELEASE LEFT SHOULDER JOINT, PERCUTANEOUS ENDOSCOPIC APPROACH: ICD-10-PCS | Performed by: ORTHOPAEDIC SURGERY

## 2019-12-03 PROCEDURE — 89051 BODY FLUID CELL COUNT: CPT | Performed by: EMERGENCY MEDICINE

## 2019-12-03 PROCEDURE — 0RBK4ZZ EXCISION OF LEFT SHOULDER JOINT, PERCUTANEOUS ENDOSCOPIC APPROACH: ICD-10-PCS | Performed by: ORTHOPAEDIC SURGERY

## 2019-12-03 PROCEDURE — 87075 CULTR BACTERIA EXCEPT BLOOD: CPT | Performed by: ORTHOPAEDIC SURGERY

## 2019-12-03 RX ORDER — SODIUM CHLORIDE, SODIUM LACTATE, POTASSIUM CHLORIDE, CALCIUM CHLORIDE 600; 310; 30; 20 MG/100ML; MG/100ML; MG/100ML; MG/100ML
100 INJECTION, SOLUTION INTRAVENOUS CONTINUOUS
Status: DISCONTINUED | OUTPATIENT
Start: 2019-12-03 | End: 2019-12-04

## 2019-12-03 RX ORDER — ONDANSETRON 2 MG/ML
4 INJECTION INTRAMUSCULAR; INTRAVENOUS EVERY 4 HOURS PRN
Status: DISCONTINUED | OUTPATIENT
Start: 2019-12-03 | End: 2019-12-06 | Stop reason: HOSPADM

## 2019-12-03 RX ORDER — OXYCODONE HYDROCHLORIDE 5 MG/1
5 TABLET ORAL EVERY 4 HOURS PRN
Status: DISCONTINUED | OUTPATIENT
Start: 2019-12-03 | End: 2019-12-05

## 2019-12-03 RX ORDER — HEPARIN SODIUM 5000 [USP'U]/ML
5000 INJECTION, SOLUTION INTRAVENOUS; SUBCUTANEOUS EVERY 8 HOURS SCHEDULED
Status: DISCONTINUED | OUTPATIENT
Start: 2019-12-03 | End: 2019-12-03

## 2019-12-03 RX ORDER — MIDAZOLAM HYDROCHLORIDE 2 MG/2ML
INJECTION, SOLUTION INTRAMUSCULAR; INTRAVENOUS AS NEEDED
Status: DISCONTINUED | OUTPATIENT
Start: 2019-12-03 | End: 2019-12-03 | Stop reason: SURG

## 2019-12-03 RX ORDER — GUAIFENESIN 600 MG
600 TABLET, EXTENDED RELEASE 12 HR ORAL EVERY 12 HOURS SCHEDULED
Status: DISCONTINUED | OUTPATIENT
Start: 2019-12-03 | End: 2019-12-06 | Stop reason: HOSPADM

## 2019-12-03 RX ORDER — LIDOCAINE HYDROCHLORIDE 10 MG/ML
10 INJECTION, SOLUTION EPIDURAL; INFILTRATION; INTRACAUDAL; PERINEURAL ONCE
Status: COMPLETED | OUTPATIENT
Start: 2019-12-03 | End: 2019-12-03

## 2019-12-03 RX ORDER — KETOROLAC TROMETHAMINE 30 MG/ML
INJECTION, SOLUTION INTRAMUSCULAR; INTRAVENOUS AS NEEDED
Status: DISCONTINUED | OUTPATIENT
Start: 2019-12-03 | End: 2019-12-03 | Stop reason: SURG

## 2019-12-03 RX ORDER — LORATADINE 10 MG/1
10 TABLET ORAL DAILY
Status: DISCONTINUED | OUTPATIENT
Start: 2019-12-04 | End: 2019-12-06 | Stop reason: HOSPADM

## 2019-12-03 RX ORDER — HYDROMORPHONE HCL/PF 1 MG/ML
0.5 SYRINGE (ML) INJECTION
Status: DISCONTINUED | OUTPATIENT
Start: 2019-12-03 | End: 2019-12-03 | Stop reason: HOSPADM

## 2019-12-03 RX ORDER — FENTANYL CITRATE/PF 50 MCG/ML
25 SYRINGE (ML) INJECTION
Status: DISCONTINUED | OUTPATIENT
Start: 2019-12-03 | End: 2019-12-03 | Stop reason: HOSPADM

## 2019-12-03 RX ORDER — NICOTINE 21 MG/24HR
1 PATCH, TRANSDERMAL 24 HOURS TRANSDERMAL DAILY
Status: DISCONTINUED | OUTPATIENT
Start: 2019-12-03 | End: 2019-12-06 | Stop reason: HOSPADM

## 2019-12-03 RX ORDER — DEXAMETHASONE SODIUM PHOSPHATE 4 MG/ML
INJECTION, SOLUTION INTRA-ARTICULAR; INTRALESIONAL; INTRAMUSCULAR; INTRAVENOUS; SOFT TISSUE AS NEEDED
Status: DISCONTINUED | OUTPATIENT
Start: 2019-12-03 | End: 2019-12-03 | Stop reason: SURG

## 2019-12-03 RX ORDER — SENNOSIDES 8.6 MG
1 TABLET ORAL
Status: DISCONTINUED | OUTPATIENT
Start: 2019-12-03 | End: 2019-12-06 | Stop reason: HOSPADM

## 2019-12-03 RX ORDER — OXYCODONE HYDROCHLORIDE 5 MG/1
2.5 TABLET ORAL EVERY 4 HOURS PRN
Status: DISCONTINUED | OUTPATIENT
Start: 2019-12-03 | End: 2019-12-05

## 2019-12-03 RX ORDER — HYDROMORPHONE HCL/PF 1 MG/ML
0.2 SYRINGE (ML) INJECTION EVERY 4 HOURS PRN
Status: DISCONTINUED | OUTPATIENT
Start: 2019-12-03 | End: 2019-12-05

## 2019-12-03 RX ORDER — EPHEDRINE SULFATE 50 MG/ML
INJECTION INTRAVENOUS AS NEEDED
Status: DISCONTINUED | OUTPATIENT
Start: 2019-12-03 | End: 2019-12-03 | Stop reason: SURG

## 2019-12-03 RX ORDER — FENTANYL CITRATE 50 UG/ML
INJECTION, SOLUTION INTRAMUSCULAR; INTRAVENOUS AS NEEDED
Status: DISCONTINUED | OUTPATIENT
Start: 2019-12-03 | End: 2019-12-03 | Stop reason: SURG

## 2019-12-03 RX ORDER — OXYCODONE HYDROCHLORIDE 5 MG/1
5 TABLET ORAL ONCE
Status: COMPLETED | OUTPATIENT
Start: 2019-12-03 | End: 2019-12-03

## 2019-12-03 RX ORDER — MORPHINE SULFATE 4 MG/ML
4 INJECTION, SOLUTION INTRAMUSCULAR; INTRAVENOUS ONCE
Status: COMPLETED | OUTPATIENT
Start: 2019-12-03 | End: 2019-12-03

## 2019-12-03 RX ORDER — PROPOFOL 10 MG/ML
INJECTION, EMULSION INTRAVENOUS AS NEEDED
Status: DISCONTINUED | OUTPATIENT
Start: 2019-12-03 | End: 2019-12-03 | Stop reason: SURG

## 2019-12-03 RX ORDER — ACETAMINOPHEN 325 MG/1
650 TABLET ORAL EVERY 6 HOURS SCHEDULED
Status: DISCONTINUED | OUTPATIENT
Start: 2019-12-03 | End: 2019-12-06 | Stop reason: HOSPADM

## 2019-12-03 RX ORDER — CEFAZOLIN SODIUM 1 G/3ML
INJECTION, POWDER, FOR SOLUTION INTRAMUSCULAR; INTRAVENOUS AS NEEDED
Status: DISCONTINUED | OUTPATIENT
Start: 2019-12-03 | End: 2019-12-03 | Stop reason: SURG

## 2019-12-03 RX ORDER — GLYCOPYRROLATE 0.2 MG/ML
INJECTION INTRAMUSCULAR; INTRAVENOUS AS NEEDED
Status: DISCONTINUED | OUTPATIENT
Start: 2019-12-03 | End: 2019-12-03 | Stop reason: SURG

## 2019-12-03 RX ADMIN — KETOROLAC TROMETHAMINE 30 MG: 30 INJECTION, SOLUTION INTRAMUSCULAR; INTRAVENOUS at 13:33

## 2019-12-03 RX ADMIN — LIDOCAINE HYDROCHLORIDE 10 ML: 10 INJECTION, SOLUTION EPIDURAL; INFILTRATION; INTRACAUDAL; PERINEURAL at 00:37

## 2019-12-03 RX ADMIN — ACETAMINOPHEN 650 MG: 325 TABLET, FILM COATED ORAL at 17:34

## 2019-12-03 RX ADMIN — SENNOSIDES 8.6 MG: 8.6 TABLET, FILM COATED ORAL at 21:20

## 2019-12-03 RX ADMIN — FENTANYL CITRATE 25 MCG: 50 INJECTION, SOLUTION INTRAMUSCULAR; INTRAVENOUS at 13:14

## 2019-12-03 RX ADMIN — SODIUM CHLORIDE, SODIUM LACTATE, POTASSIUM CHLORIDE, AND CALCIUM CHLORIDE 100 ML/HR: .6; .31; .03; .02 INJECTION, SOLUTION INTRAVENOUS at 19:30

## 2019-12-03 RX ADMIN — DEXAMETHASONE SODIUM PHOSPHATE 4 MG: 4 INJECTION, SOLUTION INTRAMUSCULAR; INTRAVENOUS at 12:30

## 2019-12-03 RX ADMIN — NICOTINE 1 PATCH: 14 PATCH TRANSDERMAL at 08:34

## 2019-12-03 RX ADMIN — MORPHINE SULFATE 4 MG: 4 INJECTION INTRAVENOUS at 03:54

## 2019-12-03 RX ADMIN — GUAIFENESIN 600 MG: 600 TABLET ORAL at 21:20

## 2019-12-03 RX ADMIN — FENTANYL CITRATE 25 MCG: 50 INJECTION, SOLUTION INTRAMUSCULAR; INTRAVENOUS at 13:05

## 2019-12-03 RX ADMIN — EPHEDRINE SULFATE 10 MG: 50 INJECTION, SOLUTION INTRAVENOUS at 12:39

## 2019-12-03 RX ADMIN — OXYCODONE HYDROCHLORIDE 5 MG: 5 TABLET ORAL at 00:37

## 2019-12-03 RX ADMIN — ONDANSETRON 4 MG: 2 INJECTION INTRAMUSCULAR; INTRAVENOUS at 08:34

## 2019-12-03 RX ADMIN — CEFAZOLIN SODIUM 1000 MG: 1 INJECTION, POWDER, FOR SOLUTION INTRAMUSCULAR; INTRAVENOUS at 13:34

## 2019-12-03 RX ADMIN — SODIUM CHLORIDE, SODIUM LACTATE, POTASSIUM CHLORIDE, AND CALCIUM CHLORIDE: .6; .31; .03; .02 INJECTION, SOLUTION INTRAVENOUS at 13:14

## 2019-12-03 RX ADMIN — SODIUM CHLORIDE, SODIUM LACTATE, POTASSIUM CHLORIDE, AND CALCIUM CHLORIDE: .6; .31; .03; .02 INJECTION, SOLUTION INTRAVENOUS at 12:17

## 2019-12-03 RX ADMIN — VANCOMYCIN HYDROCHLORIDE 750 MG: 750 INJECTION, SOLUTION INTRAVENOUS at 04:53

## 2019-12-03 RX ADMIN — ONDANSETRON 4 MG: 2 INJECTION INTRAMUSCULAR; INTRAVENOUS at 13:29

## 2019-12-03 RX ADMIN — GLYCOPYRROLATE 0.2 MG: 0.2 INJECTION INTRAMUSCULAR; INTRAVENOUS at 12:18

## 2019-12-03 RX ADMIN — FENTANYL CITRATE 25 MCG: 50 INJECTION, SOLUTION INTRAMUSCULAR; INTRAVENOUS at 12:55

## 2019-12-03 RX ADMIN — FENTANYL CITRATE 25 MCG: 50 INJECTION, SOLUTION INTRAMUSCULAR; INTRAVENOUS at 12:27

## 2019-12-03 RX ADMIN — PROPOFOL 100 MG: 10 INJECTION, EMULSION INTRAVENOUS at 12:21

## 2019-12-03 RX ADMIN — MIDAZOLAM 2 MG: 1 INJECTION INTRAMUSCULAR; INTRAVENOUS at 12:15

## 2019-12-03 RX ADMIN — SODIUM CHLORIDE, SODIUM LACTATE, POTASSIUM CHLORIDE, AND CALCIUM CHLORIDE 100 ML/HR: .6; .31; .03; .02 INJECTION, SOLUTION INTRAVENOUS at 05:55

## 2019-12-03 NOTE — CONSULTS
Orthopaedic Surgery Consult Note    Chief Complaint:  Chief Complaint   Patient presents with    Arm Pain     Left arm/shoulder pain that started today; states has been getting pain/numbness in arms for few years but this is different  Denies diaphoresis/vomiting/sob  Some nausea this afternoon  HPI:  Killian Whitehead is a 62 y o  female, who presented to the ED yesterday evening with complaint of left shoulder pain  She reports that the pain started out of the blue, no trauma or injury that she recalls  She did have radiographs that did not reveal any fracture or dislocation  She had pain with ROM, warmth about the joint and there was concern for possible left shoulder septic arthritis  The shoulder joint or subacromial bursa was aspirated and the fluid was sent for analysis, which revealed 135K WBC  She was admitted to medicine in stable condition  This morning the patient is having continued pain with shoulder ROM  Vitals stable  She denied any pain in other joints and does not have any infections or pain elsewhere in her body that she is aware  She denied IVDA  She does have several tatoos - denied any recent tatoos or other procedures to any other body part  Patient states that symptoms began approximately 12 hours ago  Symptoms are described as: constant; aching ; aggravated by movement; relieved by pain medication and sitting still; nonradiating  Associated symptoms include: none  Orthopaedics was consulted to evaluate: L septic shoulder  Pertinent highlights from PMHx and/or other injuries include: as below      Patient Active Problem List   Diagnosis    Joint infection ([address] )    Leukocytosis    Abnormal finding on CT scan    Tobacco use    Pyogenic arthritis of left shoulder region [facility])       Past Medical History:   Diagnosis Date    No known health problems        Past Surgical History:   Procedure Laterality Date    HYSTERECTOMY          Social History Socioeconomic History    Marital status: /Civil Union     Spouse name: Not on file    Number of children: Not on file    Years of education: Not on file    Highest education level: Not on file   Occupational History    Not on file   Social Needs    Financial resource strain: Not on file    Food insecurity:     Worry: Not on file     Inability: Not on file    Transportation needs:     Medical: Not on file     Non-medical: Not on file   Tobacco Use    Smoking status: Current Every Day Smoker     Packs/day: 0 50    Smokeless tobacco: Never Used    Tobacco comment: refused   Substance and Sexual Activity    Alcohol use: Not Currently    Drug use: No    Sexual activity: Not on file   Lifestyle    Physical activity:     Days per week: Not on file     Minutes per session: Not on file    Stress: Not on file   Relationships    Social connections:     Talks on phone: Not on file     Gets together: Not on file     Attends Restorationist service: Not on file     Active member of club or organization: Not on file     Attends meetings of clubs or organizations: Not on file     Relationship status: Not on file    Intimate partner violence:     Fear of current or ex partner: Not on file     Emotionally abused: Not on file     Physically abused: Not on file     Forced sexual activity: Not on file   Other Topics Concern    Not on file   Social History Narrative    Not on file        History reviewed  No pertinent family history  Allergies   Allergen Reactions    Iodinated Diagnostic Agents Hives    Penicillins Hives        Prior to Admission medications    Not on File        REVIEW OF SYSTEMS:  All systems negative except as per HPI  OBJECTIVE:  Vitals:    12/03/19 0721   BP: 148/70   Pulse: 58   Resp: 16   Temp: 98 1 °F (36 7 °C)   SpO2: 93%        PHYSICAL EXAM  GENERAL: No acute distress  Alert and oriented  Well nourished and well hydrated  Appears stated age  HEENT : Normocephalic, atraumatic  Extraocular movements intact  Mucous membranes moist  NECK: Supple, trachea midline  LUNGS: Adequate and symmetric respiratory effort  No intercostal retractions or accessory muscle use  HEART: Extremities warm and perfused  ABDOMEN: Nondistended  SKIN: Warm and dry, no rash  MUSCULOSKELETAL EXAM  LEFT SHOULDER  Dressing in place to anterolateral shoulder  Shoulder is warm, no obvious effusion detectable  No drainage  No fluctuance or palpable fluid collection  ABD: 45 (limited by pain)  FF: 45 (limited by pain)  Pain with shoulder IR, ER  Motor intact AIN, PIN, ulnar, median  SILT: ulnar, median, radial distributions  Fingers WWP  RADIOLOGY  I have personally reviewed radiology images: plain films of left shoulder demonstrate no obvious fractures, dislocation  Mild-moderate glenohumeral degenerative changes  CT left shoulder obtained and there is no obvious fluid collection or fracture  PROCEDURE: None    Assessment / Recommendations:    62 y o  female with the following musculoskeletal problems:    1  Left Shoulder pain with aspiration consistent with septic arthritis  - Neurovascular intact  - cell count 135K and no crystals, this is consistent with septic arthritis  - continued pain following aspiration  - recommend arthroscopic shoulder scope L shoulder for I&D today  - hold abx, dvt ppx for OR today  - rec ID consult      Edwar Rinaldi MD  Department of [place]  9:22 AM

## 2019-12-03 NOTE — OP NOTE
OPERATIVE NOTE    PATIENT: Flora Mendez  MRN: [id number]  DATE OF SURGERY: 12/03/19     SURGEON: Kain Mendiola MD    ASSISTANT: None    PREOPERATIVE DIAGNOSIS: Pyogenic arthritis of left shoulder region, due to unspecified organism [facility]) [M00 9]       POSTOPERATIVE DIAGNOSIS: Same    PROCEDURES PERFORMED:    Left shoulder arthroscopy with irrigation and debridement of glenohumeral joint and subacromial space  STAFF:   Circulator: Frasnisca Otto RN; Rich Shin RN  Scrub Person: Alba Altamirano RN    ANESTHESIA: Choice    ESTIMATED BLOOD LOSS: Minimal    TOURNIQUET TIME: * No tourniquets in log *    IMPLANTS:  * No implants in log *    COMPLICATIONS: None  INDICATIONS:  @ is a 62 y o  female left shoulder pain and aspiration with greater than 135,000 WBC consistent with septic arthritis of the subacromial bursa and/or the glenohumeral joint  We reviewed risks and benefits of surgery  It was agreed  to proceed with surgery to address the pathology that was causing the patient's symptoms  FINDINGS:    Arthroscopic evaluation of the right shoulder revealed the following:  Glenoid articular surface: No notable chondral defects  Humeral head articular surface: No notable chondral defects except for some abrasion adjacent to the long head of the biceps tendon  Labrum: There was tearing of the superior labrum that was involved the bicipital attachment to the superior labrum  The labrum in this region was fragmented and getting entrapped between the humeral head and glenoid  Biceps tendon: intact      DESCRIPTION OF PROCEDURE:    In the pre-operative holding area, the patient identified the correct operative extremity and I marked that extremity with my initials, using a marking pen  The patient was then brought to the operating room and positioned supine  Following satisfactory induction of anesthesia, the patient was positioned in the lateral position   The operative shoulder was prepped in the usual sterile fashion for surgery  Before any surgical instrumentation was passed to me by the surgical technician, a formalized time-out occurred, which involves the surgeon, circulating nurse, and anesthesia staff all verifying the correct operative extremity  My initials were visible on the prepped and draped operative field  The anatomic landmarks of the scapular spine, acromion, clavicle, and coracoid process were marked  Subsequently, a posterior portal was marked in the soft recess between the glenoid and humeral head  The posterior portal was established with a scalpel  The arthroscope was introduced through this portal  Under direct visualization, the anterior portal was established with a localizing needle followed by a scalpel  A probe was then introduced into the anterior portal   The capsule had significant synovitis  Their was some fraying of the anterosuperior labrum  The biceps tendon was intact  All particulate debris was removed  We obtained a sample of the inflamed synovium  We then irrigated the joint with 3L fluid  At this point the scope was removed and introduced into the subacromial space - there was no significant fluid collection  The shaver was then introduced into the subacromial space and a minimal subacromial decompression was performed  The portals were closed with interrupted 3-0 monocryl suture, using a figure of eight technique  The skin was cleansed with sterile saline and dried before Steri-Strips were applied  Finally, a sterile dressing was secured by tape  The patient tolerated the procedure without complication and was transported to the recovery room in stable condition  The patient may be weight bearing and range of motion as tolerated to the operative shoulder  Infectious disease is following the patient  Atul Perdomo  Mir [place], [address]  12/03/19  1:51 PM
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
## 2019-12-03 NOTE — PLAN OF CARE
Problem: PAIN - ADULT  Goal: Verbalizes/displays adequate comfort level or baseline comfort level  Description  Interventions:  - Encourage patient to monitor pain and request assistance  - Assess pain using appropriate pain scale  - Administer analgesics based on type and severity of pain and evaluate response  - Implement non-pharmacological measures as appropriate and evaluate response  - Consider cultural and social influences on pain and pain management  - Notify physician/advanced practitioner if interventions unsuccessful or patient reports new pain  Outcome: Progressing     Problem: INFECTION - ADULT  Goal: Absence or prevention of progression during hospitalization  Description  INTERVENTIONS:  - Assess and monitor for signs and symptoms of infection  - Monitor lab/diagnostic results  - Monitor all insertion sites, i e  indwelling lines, tubes, and drains  - Monitor endotracheal if appropriate and nasal secretions for changes in amount and color  - [place] appropriate cooling/warming therapies per order  - Administer medications as ordered  - Instruct and encourage patient and family to use good hand hygiene technique  - Identify and instruct in appropriate isolation precautions for identified infection/condition  Outcome: Progressing  Goal: Absence of fever/infection during neutropenic period  Description  INTERVENTIONS:  - Monitor WBC    Outcome: Progressing     Problem: SAFETY ADULT  Goal: Patient will remain free of falls  Description  INTERVENTIONS:  - Assess patient frequently for physical needs  -  Identify cognitive and physical deficits and behaviors that affect risk of falls    -  [place] fall precautions as indicated by assessment   - Educate patient/family on patient safety including physical limitations  - Instruct patient to call for assistance with activity based on assessment  - Modify environment to reduce risk of injury  - Consider OT/PT consult to assist with strengthening/mobility  Outcome: Progressing  Goal: Maintain or return to baseline ADL function  Description  INTERVENTIONS:  -  Assess patient's ability to carry out ADLs; assess patient's baseline for ADL function and identify physical deficits which impact ability to perform ADLs (bathing, care of mouth/teeth, toileting, grooming, dressing, etc )  - Assess/evaluate cause of self-care deficits   - Assess range of motion  - Assess patient's mobility; develop plan if impaired  - Assess patient's need for assistive devices and provide as appropriate  - Encourage maximum independence but intervene and supervise when necessary  - Involve family in performance of ADLs  - Assess for home care needs following discharge   - Consider OT consult to assist with ADL evaluation and planning for discharge  - Provide patient education as appropriate  Outcome: Progressing  Goal: Maintain or return mobility status to optimal level  Description  INTERVENTIONS:  - Assess patient's baseline mobility status (ambulation, transfers, stairs, etc )    - Identify cognitive and physical deficits and behaviors that affect mobility  - Identify mobility aids required to assist with transfers and/or ambulation (gait belt, sit-to-stand, lift, walker, cane, etc )  - [place] fall precautions as indicated by assessment  - Record patient progress and toleration of activity level on Mobility SBAR; progress patient to next Phase/Stage  - Instruct patient to call for assistance with activity based on assessment  - Consider rehabilitation consult to assist with strengthening/weightbearing, etc   Outcome: Progressing     Problem: DISCHARGE PLANNING  Goal: Discharge to home or other facility with appropriate resources  Description  INTERVENTIONS:  - Identify barriers to discharge w/patient and caregiver  - Arrange for needed discharge resources and transportation as appropriate  - Identify discharge learning needs (meds, wound care, etc )  - Arrange for interpretive services to assist at discharge as needed  - Refer to Case Management Department for coordinating discharge planning if the patient needs post-hospital services based on physician/advanced practitioner order or complex needs related to functional status, cognitive ability, or social support system  Outcome: Progressing     Problem: Knowledge Deficit  Goal: Patient/family/caregiver demonstrates understanding of disease process, treatment plan, medications, and discharge instructions  Description  Complete learning assessment and assess knowledge base    Interventions:  - Provide teaching at level of understanding  - Provide teaching via preferred learning methods  Outcome: Progressing     Problem: SKIN/TISSUE INTEGRITY - ADULT  Goal: Incision(s), wounds(s) or drain site(s) healing without S/S of infection  Description  INTERVENTIONS  - Assess and document risk factors for skin impairment   - Assess and document dressing, incision, wound bed, drain sites and surrounding tissue  - Consider nutrition services referral as needed  - Oral mucous membranes remain intact  - Provide patient/ family education  Outcome: Progressing

## 2019-12-03 NOTE — UTILIZATION REVIEW
Continued Stay Review  12/3/2019 0350 Observation and changed 12/3/2019 1509 to inpatient re:  Patient with septic arthritis with increased WBC on synovial fluid pending cultures  requiring IV antibiotics, pain control and OR washout  Patient on DVT prophylaxis with bilateral SCDs    Start   Ordered   12/03/19 1509  Inpatient Admission Once     Transfer Service: Hospitalist       Question Answer Comment   Admitting Physician Shiv Waldrop    Level of Care Med Surg    Estimated length of stay More than 2 Midnights    Certification I certify that inpatient services are medically necessary for this patient for a duration of greater than two midnights  See H&P and MD Progress Notes for additional information about the patient's course of treatment  12/03/19 1509       Date: 12/3/2019                        Current Patient Class: inpatient   Current Level of Care: med surg     HPI:59 y o  female initially admitted on 12/3/2019 to observation due to joint infection  Presented due to left arm and left shoulder pain  Bedside US with fluid collection  Aspiration of  Synovial fluid from right shoulder approximately 10cc purulent fluid done and appears cloudy with ,700 and RBC 12,000  Culture sent  CRP is 11 3  Wbc 13 27   CT of left shoulder showed bursitis  Per orthopedics - Left Shoulder pain with aspiration consistent with septic arthritis  recommend arthroscopic shoulder scope L shoulder for I&D today     Per ID -  Septic left shoulder with unclear etiology  Continue vancomycin  For washout     Assessment/Plan: today concern is for septic left shoulder/septic arthritis with synovial fluid with WBC >100,000 and > 90% neutrophils  IV antibiotics to continue pending cultures    Orthopedics have done washout    Procedure- 12/3/2019 -Left shoulder arthroscopy with irrigation and debridement of glenohumeral joint and subacromial space   findings- Arthroscopic evaluation of the right shoulder revealed the following:  Glenoid articular surface: No notable chondral defects  Humeral head articular surface: No notable chondral defects except for some abrasion adjacent to the long head of the biceps tendon  Labrum: There was tearing of the superior labrum that was involved the bicipital attachment to the superior labrum   The labrum in this region was fragmented and getting entrapped between the humeral head and glenoid     Biceps tendon: intact    Pertinent Labs/Diagnostic Results:   12/3/2019  CT left shoulder - Subacromial and subdeltoid bursitis   No definite evidence of large joint effusion   If high clinical suspicion for infection MRI can be obtained      12/2/2109 EKG - Rhythm: normal sinus  Intervals: normal intervals  Axis: normal axis  QRS/Blocks: normal QRS  ST Changes: No acute ST Changes, no STD/ISA    Inverted T wave in III, new from previous 5/2018  Results from last 7 days   Lab Units 12/03/19  0540 12/02/19  2258   WBC Thousand/uL 11 72* 13 27*   HEMOGLOBIN g/dL 12 7 13 9   HEMATOCRIT % 38 5 41 8   PLATELETS Thousands/uL 219 255   NEUTROS ABS Thousands/µL 8 54* 10 35*     Results from last 7 days   Lab Units 12/02/19  2258   SODIUM mmol/L 140   POTASSIUM mmol/L 4 0   CHLORIDE mmol/L 103   CO2 mmol/L 26   ANION GAP mmol/L 11   BUN mg/dL 23   CREATININE mg/dL 1 11   EGFR ml/min/1 73sq m 54   CALCIUM mg/dL 9 2     Results from last 7 days   Lab Units 12/02/19  2258   GLUCOSE RANDOM mg/dL 102     Results from last 7 days   Lab Units 12/02/19  2258   CK TOTAL U/L 68     Results from last 7 days   Lab Units 12/02/19  2258   TROPONIN I ng/mL <0 02     Results from last 7 days   Lab Units 12/03/19  0540   PROCALCITONIN ng/ml 0 08     Results from last 7 days   Lab Units 12/02/19  2259   CRP mg/L 11 3*   SED RATE mm/hour 3     Results from last 7 days   Lab Units 12/03/19  0201   GRAM STAIN RESULT  4+ Polys  No bacteria seen     Results from last 7 days   Lab Units 12/03/19  0201   TOTAL COUNTED 100   NEUTROPHIL % (SYNOVIAL) % 96   WBC FLUID /ul 135,700*   RBC, SYNOVIAL  12,000*   CRYSTALS, SYNOVIAL FLUID  No Crystals Seen       Vital Signs:   12/03/19 1445  97 5 °F (36 4 °C)  58  18  165/74  94 %  None (Room air)    Lying   12/03/19 1355  97 °F (36 1 °C)Abnormal   98  12  173/103Abnormal   100 %  Nasal cannula           Medications:   Scheduled Medications:  Medications:  acetaminophen 650 mg Oral Q6H [address]   influenza vaccine 0 5 mL Intramuscular Once   nicotine 1 patch Transdermal Daily   senna 1 tablet Oral HS   vancomycin (VANCOCIN) IVPB (premix) 750 mg   Dose: 15 mg/kg  Weight Dosing Info: 57 3 kg  Freq: Once Route: IV  Last Dose: Stopped (12/03/19 0555)  Start: 12/03/19 0345 End: 12/03/19 0555    Continuous IV Infusions:  lactated ringers 100 mL/hr Intravenous Continuous     PRN Meds:  HYDROmorphone 0 2 mg Intravenous Q4H PRN   Ondansetron - used x 2 (7132, 8654) 4 mg Intravenous Q4H PRN   oxyCODONE 2 5 mg Oral Q4H PRN   Or      oxyCODONE 5 mg Oral Q4H PRN       Discharge Plan: to be determined,     Network Utilization Review Department  [email or web address]  ATTENTION: Please call with any questions or concerns to [phone] and carefully listen to the prompts so that you are directed to the right person  All voicemails are confidential   Monica Luciano all requests for admission clinical reviews, approved or denied determinations and any other requests to dedicated fax number below belonging to the campus where the patient is receiving treatment   List of dedicated fax numbers for the Facilities:  FACILITY NAME UR FAX NUMBER   ADMISSION DENIALS (Administrative/Medical Necessity) [phone]   [address]  (Maternity/NICU/Pediatrics) [phone]   Amy Silva [phone]   Shantelle Bynum [phone]   Mile Morin [phone]   Figueroa Garduno [address] CAMPUS [facility]ilma Puentes [facility] [phone]   [facility] [address] [phone]   [address] [place] [address] [phone]

## 2019-12-03 NOTE — CONSULTS
Consultation - Infectious Disease   Gerardo Welsh 62 y o  female MRN: [id number]  Unit/Bed#: [address] [place] [address] [id number] Encounter: [id number]      Assessment/Plan   1  Septic L shoulder:  WBC > 100,000 > 90 % neutrophils suspect infectious  Etiology not clear  Patient has already received vancomycin  Original tap gs is pending       - for washout this afternoon  - cont vancomycin since started already  - await OR findings and cultures  - await fluid gram stain  - follow cbc and fever curve  - check Hep C ab    History of Present Illness   Physician Requesting Consult: Tracey Luciano MD  Reason for Consult / Principal Problem: septic arthritis shoulder    HPI: Gerardo Welsh is a 62y o  year old female  with long h/o tobacco abuse, denies other PMHx  Patient works as   Yesterday during day she developed progressive pain in her L shoulder  No fevers or chills  Eventually to ER with severe pain  Shoulder tapped for > 100,000, wbc count, 90 % neutrophils, no crystals, gs pending  Patient rx with IV vancomycin and she was admitted  Patient denies trauma, denies IVDA  Denies, fevers, chills, abd pains, n/v/d  No weight loss  Consults    ROS: 12 systems reviewed, remainder is neg  Historical Information   Past Medical History:   Diagnosis Date    No known health problems      Past Surgical History:   Procedure Laterality Date    HYSTERECTOMY       Social History   Social History     Substance and Sexual Activity   Alcohol Use Not Currently     Social History     Substance and Sexual Activity   Drug Use No     Social History     Tobacco Use   Smoking Status Current Every Day Smoker    Packs/day: 0 50   Smokeless Tobacco Never Used   Tobacco Comment    refused     History reviewed  No pertinent family history      Meds/Allergies   MEDS: reviewed      Current Facility-Administered Medications:     acetaminophen (TYLENOL) tablet 650 mg, 650 mg, Oral, Q6H [address], SADIQ Granados   HYDROmorphone (DILAUDID) injection 0 2 mg, 0 2 mg, Intravenous, Q4H PRN, SADIQ Gould    influenza vaccine, recombinant, quadrivalent (FLUBLOK) IM injection 0 5 mL, 0 5 mL, Intramuscular, Once, Delicia Mariscal MD    lactated ringers infusion, 100 mL/hr, Intravenous, Continuous, SADIQ Araiza, Last Rate: 100 mL/hr at 12/03/19 0555, 100 mL/hr at 12/03/19 0555    nicotine (NICODERM CQ) 14 mg/24hr TD 24 hr patch 1 patch, 1 patch, Transdermal, Daily, SADIQ Gould, 1 patch at 12/03/19 0834    ondansetron (ZOFRAN) injection 4 mg, 4 mg, Intravenous, Q4H PRN, Jose Zamorano MD, 4 mg at 12/03/19 0834    oxyCODONE (ROXICODONE) IR tablet 2 5 mg, 2 5 mg, Oral, Q4H PRN **OR** oxyCODONE (ROXICODONE) IR tablet 5 mg, 5 mg, Oral, Q4H PRN, SADIQ Gould    senna (SENOKOT) tablet 8 6 mg, 1 tablet, Oral, HS, SADIQ Araiza    Allergies   Allergen Reactions    Iodinated Diagnostic Agents Hives    Penicillins Hives         Intake/Output Summary (Last 24 hours) at 12/3/2019 0837  Last data filed at 12/3/2019 0600  Gross per 24 hour   Intake 158 33 ml   Output    Net 158 33 ml       PE: VSS, afebrile   NAD   HEENT: anicteric    NECK: supple, no adenopathy  CARDIAC: rrr s1s2  LUNGS: cta bilaterally  ABDOMEN: soft nt/nd + BS  EXTREMITIES: no edema, L shoulder decreased ROM due to pain  SKIN: multiple tatoos  NEURO: grossly non-focal  : no daniel  Psych: nl affect    Invasive Devices:   Peripheral IV 12/02/19 Right Forearm (Active)   Site Assessment Clean;Dry; Intact 12/3/2019  5:02 AM   Dressing Type Transparent 12/3/2019  5:02 AM   Line Status Infusing 12/3/2019  5:02 AM   Dressing Status Dry; Intact; Clean 12/3/2019  5:02 AM           Lab Results:   Admission on 12/02/2019   Component Date Value    WBC 12/02/2019 13 27*    RBC 12/02/2019 4 66     Hemoglobin 12/02/2019 13 9     Hematocrit 12/02/2019 41 8     MCV 12/02/2019 90     MCH 12/02/2019 29 8     MCHC 12/02/2019 33 3     RDW 12/02/2019 12 8     MPV 12/02/2019 10 0     Platelets 43/21/2182 255     Neutrophils Relative 12/02/2019 78*    Immat GRANS % 12/02/2019 0     Lymphocytes Relative 12/02/2019 11*    Monocytes Relative 12/02/2019 10     Eosinophils Relative 12/02/2019 1     Basophils Relative 12/02/2019 0     Neutrophils Absolute 12/02/2019 10 35*    Immature Grans Absolute 12/02/2019 0 03     Lymphocytes Absolute 12/02/2019 1 46     Monocytes Absolute 12/02/2019 1 27*    Eosinophils Absolute 12/02/2019 0 12     Basophils Absolute 12/02/2019 0 04     Sodium 12/02/2019 140     Potassium 12/02/2019 4 0     Chloride 12/02/2019 103     CO2 12/02/2019 26     ANION GAP 12/02/2019 11     BUN 12/02/2019 23     Creatinine 12/02/2019 1 11     Glucose 12/02/2019 102     Calcium 12/02/2019 9 2     eGFR 12/02/2019 54     Troponin I 12/02/2019 <0 02     Total CK 12/02/2019 68     Sed Rate 12/02/2019 3     CRP 12/02/2019 11 3*    Site 12/03/2019 Left Shoulder     Color, Fluid 12/03/2019 Straw     Clarity, Fluid 12/03/2019 Cloudy*    WBC, Fluid 12/03/2019 135,700*    Crystals, Synovial Fluid 12/03/2019 No Crystals Seen     RBC, SYNOVIAL 12/03/2019 12,000*    Total Counted 12/03/2019 100     Neutrophil % Synovial 12/03/2019 96     Mononuclear Cell % Synov* 12/03/2019 4     WBC 12/03/2019 11 72*    RBC 12/03/2019 4 24     Hemoglobin 12/03/2019 12 7     Hematocrit 12/03/2019 38 5     MCV 12/03/2019 91     MCH 12/03/2019 30 0     MCHC 12/03/2019 33 0     RDW 12/03/2019 12 9     MPV 12/03/2019 10 5     Platelets 63/55/9080 219     Neutrophils Relative 12/03/2019 73     Immat GRANS % 12/03/2019 0     Lymphocytes Relative 12/03/2019 14     Monocytes Relative 12/03/2019 12     Eosinophils Relative 12/03/2019 1     Basophils Relative 12/03/2019 0     Neutrophils Absolute 12/03/2019 8 54*    Immature Grans Absolute 12/03/2019 0 02     Lymphocytes Absolute 12/03/2019 1 58     Monocytes Absolute 12/03/2019 1 39*    Eosinophils Absolute 12/03/2019 0 16     Basophils Absolute 12/03/2019 0 03      Imaging Studies: I have personally reviewed pertinent reports  EKG, Pathology, and Other Studies: I have personally reviewed pertinent reports        Culture  No results found for: BLOODCX  No results found for: WOUNDCULT  No results found for: URINECX  No results found for: SPUTUMCULTUR    Principal Problem:    Joint infection ([address] )  Active Problems:    Leukocytosis    Abnormal finding on CT scan    Tobacco use    Pyogenic arthritis of left shoulder region ([address] )

## 2019-12-03 NOTE — ANESTHESIA PREPROCEDURE EVALUATION
Review of Systems/Medical History  Patient summary reviewed  Chart reviewed      Cardiovascular  EKG reviewed,    Pulmonary  Smoker cigarette smoker  Cumulative Pack Years: 21,        GI/Hepatic            Endo/Other     GYN       Hematology   Musculoskeletal    Comment: Septic arthritis Arthritis     Neurology   Psychology           Physical Exam    Airway    Mallampati score: II  TM Distance: >3 FB  Neck ROM: full     Dental   upper dentures and lower dentures,     Cardiovascular  Rhythm: regular, Rate: normal, Cardiovascular exam normal    Pulmonary  Pulmonary exam normal Breath sounds clear to auscultation,     Other Findings        Anesthesia Plan  ASA Score- 2     Anesthesia Type- general with ASA Monitors  Additional Monitors:   Airway Plan: LMA  Comment: Benefits and risks of planned anesthetic discussed with patient, and agrees to proceed  I, Dr Sri Gibbs, the attending anesthesiologist, have personally seen and evaluated the patient prior to anesthetic care  I have reviewed the preanesthetic record, and other medical records if appropriate to the anesthetic care  If a CRNA is involved in the case, I have reviewed the CRNA assessment, if present, and agree  The patient is in a suitable condition to proceed with my formulated anesthetic plan        Plan Factors-    Induction- intravenous  Postoperative Plan- Plan for postoperative opioid use  Informed Consent- Anesthetic plan and risks discussed with patient

## 2019-12-03 NOTE — H&P
H&P- Kaia Colon 1960, 62 y o  female MRN: [id number]    Unit/Bed#: [address] [place] [address] Encounter: [id number]    Primary Care Provider: Sherron Harris MD   Date and time admitted to hospital: 12/2/2019 10:30 PM      * Joint infection [facility])  Assessment & Plan  · Patient presented with left shoulder pain, CT of shoulder revealed subacromial and subdeltoid bursitis  No definite evidence of large joint effusion  · Joint was aspirated by ED provider and sent to the lab which revealed a white blood cell count of 135,700 & 96% Neutrophil count  · Official culture still pending  · Patient has been afebrile, white blood cell count was 13 27 on CBC  · Orthopedic surgery was consulted in the ED, recommending admission and IV antibiotics, will evaluate patient in the morning  · Due to penicillin allergy patient was started on vancomycin in the ED, Will continue at this time  · Obtain procalcitonin with a m  Labs  · Scheduled Tylenol for pain management  · Will order p r n   Dosing for pain regimen   · Maintain patient NPO for possible wash out       Abnormal finding on CT scan  Assessment & Plan  · Left shoulder CT imaging revealing subacromial and subdeltoid bursitis with no definitive evidence of a large joint effusion  · Interventions as above    Leukocytosis  Assessment & Plan  · Likely related to above finding  · Interventions as above    Tobacco use  Assessment & Plan  · Patient states that she smokes about half a pack per day  · Discussed smoking cessation  · Nicotine patch ordered      History and Physical - [facility]    Patient Information: Kaia Colon 62 y o  female MRN: [id number]  Unit/Bed#: [address] [address] Encounter: [id number]  Admitting Physician: SADIQ Hatch  PCP: Sherron Harris MD  Date of Admission:  12/03/19    Assessment/Plan:    Hospital Problem List:     Principal Problem:    Joint infection [facility])  Active Problems:    Abnormal finding on CT scan Leukocytosis    Tobacco use    VTE Prophylaxis: Heparin  / sequential compression device   Code Status: FC  POLST: There is no POLST form on file for this patient (pre-hospital)    Anticipated Length of Stay:  Patient will be admitted on an Observation basis with an anticipated length of stay of 1-2 midnights  Justification for Hospital Stay: Orthopedic evaluation of joint     Total Time for Visit, including Counseling / Coordination of Care: 15 minutes  Greater than 50% of this total time spent on direct patient counseling and coordination of care  Chief Complaint:   Left shoulder pain     History of Present Illness:    Jasmina Duran is a 62 y o  female who presents with no significant past medical history  Expresses having pain in the left shoulder  CT imaging of the left shoulder revealed subacromial and subdeltoid bursitis  A Joint aspiration of synovial fluid was performed in the ED which revealed a white blood cell count of 135,700 & 96% neutrophil count  CBC revealed white blood cell count of 13 26, the patient has been afebrile  Orthopedic surgery was consulted, recommending admission with IV antibiotics and they will evaluate this morning  The patient was started on IV vancomycin due to a penicillin allergy  Review of Systems:    Review of Systems   Constitutional: Negative  HENT: Negative  Eyes: Negative  Respiratory: Positive for cough  Cardiovascular: Negative  Gastrointestinal: Negative  Endocrine: Negative  Genitourinary: Negative  Musculoskeletal: Positive for arthralgias and joint swelling  Skin: Negative  Neurological: Negative  Hematological: Negative  Psychiatric/Behavioral: Negative          Past Medical and Surgical History:     Past Medical History:   Diagnosis Date    No known health problems        Past Surgical History:   Procedure Laterality Date    HYSTERECTOMY         Meds/Allergies:    Prior to Admission medications    Not on File     I have reviewed home medications with patient personally  Allergies: Allergies   Allergen Reactions    Iodinated Diagnostic Agents Hives    Penicillins Hives       Social History:     Marital Status: /Civil Union   Occupation:  at [employer]     Substance Use History:   Social History     Substance and Sexual Activity   Alcohol Use No     Social History     Tobacco Use   Smoking Status Current Every Day Smoker   Smokeless Tobacco Never Used     Social History     Substance and Sexual Activity   Drug Use No       Family History:    History reviewed  No pertinent family history  Physical Exam:     Vitals:   Blood Pressure: 140/70 (12/03/19 0207)  Pulse: 58 (12/03/19 0300)  Temperature: (!) 97 2 °F (36 2 °C) (12/02/19 2227)  Respirations: 18 (12/03/19 0300)  Weight - Scale: 57 3 kg (126 lb 5 2 oz) (12/03/19 0207)  SpO2: 95 % (12/03/19 0300)    Physical Exam   Constitutional: She is oriented to person, place, and time  No distress  HENT:   Head: Normocephalic and atraumatic  Mouth/Throat: Oropharynx is clear and moist    Eyes: Pupils are equal, round, and reactive to light  Neck: Normal range of motion  No tracheal deviation present  Cardiovascular: Normal rate and regular rhythm  Pulmonary/Chest: Effort normal and breath sounds normal  No respiratory distress  Abdominal: Soft  Bowel sounds are normal  She exhibits no distension  Musculoskeletal:   Left upper extremity limited ROM  Lymphadenopathy:     She has no cervical adenopathy  Neurological: She is alert and oriented to person, place, and time  Skin: Skin is warm and dry  She is not diaphoretic  Psychiatric: She has a normal mood and affect  Her behavior is normal        Additional Data:     Lab Results: I have personally reviewed pertinent reports        Results from last 7 days   Lab Units 12/02/19  2258   WBC Thousand/uL 13 27*   HEMOGLOBIN g/dL 13 9   HEMATOCRIT % 41 8   PLATELETS Thousands/uL 255 NEUTROS PCT % 78*   LYMPHS PCT % 11*   MONOS PCT % 10   EOS PCT % 1     Results from last 7 days   Lab Units 12/02/19  2258   POTASSIUM mmol/L 4 0   CHLORIDE mmol/L 103   CO2 mmol/L 26   BUN mg/dL 23   CREATININE mg/dL 1 11   CALCIUM mg/dL 9 2           Imaging: I have personally reviewed pertinent reports  Ct Shoulder Left Wo Contrast    Result Date: 12/3/2019  Narrative: CT LEFT SHOULDER INDICATION:   shoulder pain, swelling  COMPARISON: None  TECHNIQUE: Serial axial CT images were acquired through the left shoulder  Coronal and sagittal two-dimensional reformatted images were also obtained  This examination, like all CT scans performed in the [facility], was performed utilizing techniques to minimize radiation dose exposure, including the use of iterative reconstruction and automated exposure control  Rad dose 300 22 mGy-cm Contrast material was not utilized  FINDINGS: JOINT SPACE: No significant evidence of large joint effusion  ALIGNMENT: Anatomic  OSSEOUS STRUCTURES: Unremarkable without fracture or dislocation  ROTATOR CUFF: Limited assessment by CT technique without gross evidence of tear  ACROMIOCLAVICULAR JOINT: Intact  GLENOHUMERAL JOINT: Intact  SOFT TISSUES: There is subacromial and subdeltoid bursitis  Moderate soft tissue swelling is noted  Severe emphysematous changes within the lungs are visualized  Impression: Subacromial and subdeltoid bursitis  No definite evidence of large joint effusion  If high clinical suspicion for infection MRI can be obtained  Workstation performed: [id number]       EKG, Pathology, and Other Studies Reviewed on Admission:   · EKG: pending      Allscripts / Epic Records Reviewed: Yes     ** Please Note: This note has been constructed using a voice recognition system   **

## 2019-12-03 NOTE — MALNUTRITION/BMI
This medical record reflects one or more clinical indicators suggestive of malnutrition and/or morbid obesity  BMI Findings:  BMI Classifications: Underweight < 18 5     Body mass index is 18 44 kg/m²  Treat with advanced diet and po supplements as needed  See Nutrition note dated 12/3/2019  for additional details  Completed nutrition assessment is viewable in the nutrition documentation

## 2019-12-03 NOTE — ASSESSMENT & PLAN NOTE
· Left shoulder CT imaging revealing subacromial and subdeltoid bursitis with no definitive evidence of a large joint effusion  · Interventions as above

## 2019-12-03 NOTE — ASSESSMENT & PLAN NOTE
· Patient states that she smokes about half a pack per day  · Discussed smoking cessation  · Nicotine patch ordered

## 2019-12-03 NOTE — UTILIZATION REVIEW
Initial Clinical Review    Admission: Date/Time/Statement:  12/3/2109 0350 Observation   Orders Placed This Encounter   Procedures    Place in Observation (expected length of stay for this patient is less than two midnights)     Standing Status:   Standing     Number of Occurrences:   1     Order Specific Question:   Admitting Physician     Answer:   Angelia Ross [[id number]]     Order Specific Question:   Level of Care     Answer:   Med Surg [[id number]]     ED Arrival Information     Expected Arrival Acuity Means of Arrival Escorted By Service Admission Type    - 12/2/2019 21:53 Emergent Walk-In Self General Medicine Emergency    Arrival Complaint    Arm Pain        Chief Complaint   Patient presents with    Arm Pain     Left arm/shoulder pain that started today; states has been getting pain/numbness in arms for few years but this is different  Denies diaphoresis/vomiting/sob  Some nausea this afternoon  Assessment/Plan:  this is a 62year old female from home to ED admitted to observation  Due to Joint infection  Presented due to left arm and left shoulder swelling and pain for the last 12 hours  On exam:  Swelling over the anterior aspect of the left shoulder, the joint appears warm without any overlying erythema, distally the extremity is neurovascularly intact with full strength  and intact sensation  Range of motion of the left shoulder decreased secondary to pain  Bedside US with fluid collection  Aspiration of  Synovial fluid from right shoulder approximately 10cc purulent fluid done and appears cloudy with ,700 and RBC 12,000  Culture sent  CRP is 11 3  Wbc 13 27   CT of left shoulder showed bursitis  Pain control and IV antibiotics in progress  Orthopedics and ID consulted  Follow cultures  Per orthopedics - Left Shoulder pain with aspiration consistent with septic arthritis   recommend arthroscopic shoulder scope L shoulder for I&D today    Per ID -  Septic left shoulder with unclear etiology  Continue vancomycin  For washout   ED Triage Vitals   Temperature Pulse Respirations Blood Pressure SpO2   12/02/19 2227 12/02/19 2227 12/02/19 2227 12/02/19 2227 12/02/19 2227   (!) 97 2 °F (36 2 °C) 73 16 128/71 95 %      Temp Source Heart Rate Source Patient Position - Orthostatic VS BP Location FiO2 (%)   12/03/19 0507 12/03/19 0037 12/02/19 2227 12/02/19 2227 --   Oral Monitor Sitting Left arm       Pain Score       12/02/19 2227       8        Wt Readings from Last 1 Encounters:   12/03/19 53 4 kg (117 lb 11 6 oz)     Additional Vital Signs:   12/03/19 0721  98 1 °F (36 7 °C)  58  16  148/70  93 %  None (Room air)  Lying   12/03/19 0507  98 °F (36 7 °C)  50Abnormal   18  132/62  95 %  None (Room air)         Pertinent Labs/Diagnostic Test Results:   12/3/2019  CT left shoulder - Subacromial and subdeltoid bursitis   No definite evidence of large joint effusion   If high clinical suspicion for infection MRI can be obtained  12/2/2109 EKG - Rhythm: normal sinus  Intervals: normal intervals  Axis: normal axis  QRS/Blocks: normal QRS  ST Changes: No acute ST Changes, no STD/ISA    Inverted T wave in III, new from previous 5/2018       Results from last 7 days   Lab Units 12/03/19  0540 12/02/19  2258   WBC Thousand/uL 11 72* 13 27*   HEMOGLOBIN g/dL 12 7 13 9   HEMATOCRIT % 38 5 41 8   PLATELETS Thousands/uL 219 255   NEUTROS ABS Thousands/µL 8 54* 10 35*     Results from last 7 days   Lab Units 12/02/19  2258   SODIUM mmol/L 140   POTASSIUM mmol/L 4 0   CHLORIDE mmol/L 103   CO2 mmol/L 26   ANION GAP mmol/L 11   BUN mg/dL 23   CREATININE mg/dL 1 11   EGFR ml/min/1 73sq m 54   CALCIUM mg/dL 9 2     Results from last 7 days   Lab Units 12/02/19  2258   GLUCOSE RANDOM mg/dL 102     Results from last 7 days   Lab Units 12/02/19  2258   CK TOTAL U/L 68     Results from last 7 days   Lab Units 12/02/19  2258   TROPONIN I ng/mL <0 02     Results from last 7 days   Lab Units 12/02/19  2259   CRP mg/L 11 3*   SED RATE mm/hour 3     Results from last 7 days   Lab Units 12/03/19  0201   TOTAL COUNTED  100   NEUTROPHIL % (SYNOVIAL) % 96   WBC FLUID /ul 135,700*   RBC, SYNOVIAL  12,000*   CRYSTALS, SYNOVIAL FLUID  No Crystals Seen       ED Treatment:   Medication Administration from 12/02/2019 2153 to 12/03/2019 0436       Date/Time Order Dose Route Action Comments     12/02/2019 2310 ketorolac (TORADOL) injection 15 mg 15 mg Intravenous Given      12/02/2019 2309 acetaminophen (TYLENOL) tablet 975 mg 975 mg Oral Given      12/03/2019 0037 lidocaine (PF) (XYLOCAINE-MPF) 1 % injection 10 mL 10 mL Infiltration Given given to Dr Yolande Bhat     12/03/2019 0037 oxyCODONE (ROXICODONE) IR tablet 5 mg 5 mg Oral Given      12/03/2019 0354 morphine (PF) 4 mg/mL injection 4 mg 4 mg Intravenous Given         Past Medical History:   Diagnosis Date    No known health problems      Present on Admission:  **None**      Admitting Diagnosis: Joint infection ([facility] [address] ) [M00 9]  Subdeltoid bursitis of left shoulder joint [M75 52]  Subacromial bursitis of left shoulder joint [M75 52]  Pain in arm [M79 603]  Age/Sex: 62 y o  female  Admission Orders: 12/3/2019 0350 Observation   Scheduled Medications:  Medications:  acetaminophen 650 mg Oral Q6H [address]   heparin (porcine) 5,000 Units Subcutaneous Q8H [address]   influenza vaccine 0 5 mL Intramuscular Once   nicotine 1 patch Transdermal Daily   senna 1 tablet Oral HS   vancomycin (VANCOCIN) IVPB (premix) 750 mg   Dose: 15 mg/kg  Weight Dosing Info: 57 3 kg  Freq:  Once Route: IV  Last Dose: Stopped (12/03/19 0555)  Start: 12/03/19 0345 End: 12/03/19 0555    Continuous IV Infusions:  lactated ringers 100 mL/hr Intravenous Continuous     PRN Meds: not used   HYDROmorphone 0 2 mg Intravenous Q4H PRN   oxyCODONE 2 5 mg Oral Q4H PRN   Or      oxyCODONE 5 mg Oral Q4H PRN     IP CONSULT TO ORTHOPEDIC SURGERY      Devendra Avila RN  Network Utilization Review Department  [email or web address]  ATTENTION: Please call with any questions or concerns to [phone] and carefully listen to the prompts so that you are directed to the right person  All voicemails are confidential   Nikole Weaver all requests for admission clinical reviews, approved or denied determinations and any other requests to dedicated fax number below belonging to the campus where the patient is receiving treatment   List of dedicated fax numbers for the Facilities:  [address] DENIALS (Administrative/Medical Necessity) [phone]   [address] (Maternity/NICU/Pediatrics) [phone]   Gaby Gilmore [phone]   [facility] [phone]   Melvin Regalado [phone]   [address] [phone]   Michel Mckeon [phone]   [facility] [address] 1000 [facility] [phone]

## 2019-12-03 NOTE — ASSESSMENT & PLAN NOTE
· Patient presented with left shoulder pain, CT of shoulder revealed subacromial and subdeltoid bursitis  No definite evidence of large joint effusion  · Joint was aspirated by ED provider and sent to the lab which revealed a white blood cell count of 135,700 & 96% Neutrophil count  · Official culture still pending  · Patient has been afebrile, white blood cell count was 13 27 on CBC  · Orthopedic surgery was consulted in the ED, recommending admission and IV antibiotics, will evaluate patient in the morning  · Due to penicillin allergy patient was started on vancomycin in the ED, Will continue at this time  · Obtain procalcitonin with a m  Labs  · Scheduled Tylenol for pain management  · Will order p r n   Dosing for pain regimen   · Maintain patient NPO for possible wash out

## 2019-12-04 LAB
ANION GAP SERPL CALCULATED.3IONS-SCNC: 9 MMOL/L (ref 4–13)
BUN SERPL-MCNC: 13 MG/DL (ref 5–25)
CALCIUM SERPL-MCNC: 8.8 MG/DL (ref 8.3–10.1)
CHLORIDE SERPL-SCNC: 104 MMOL/L (ref 100–108)
CO2 SERPL-SCNC: 24 MMOL/L (ref 21–32)
CREAT SERPL-MCNC: 0.81 MG/DL (ref 0.6–1.3)
ERYTHROCYTE [DISTWIDTH] IN BLOOD BY AUTOMATED COUNT: 12.7 % (ref 11.6–15.1)
GFR SERPL CREATININE-BSD FRML MDRD: 79 ML/MIN/1.73SQ M
GLUCOSE SERPL-MCNC: 94 MG/DL (ref 65–140)
HCT VFR BLD AUTO: 38.3 % (ref 34.8–46.1)
HGB BLD-MCNC: 12.4 G/DL (ref 11.5–15.4)
MCH RBC QN AUTO: 29.7 PG (ref 26.8–34.3)
MCHC RBC AUTO-ENTMCNC: 32.4 G/DL (ref 31.4–37.4)
MCV RBC AUTO: 92 FL (ref 82–98)
PLATELET # BLD AUTO: 198 THOUSANDS/UL (ref 149–390)
PMV BLD AUTO: 10.4 FL (ref 8.9–12.7)
POTASSIUM SERPL-SCNC: 4.5 MMOL/L (ref 3.5–5.3)
RBC # BLD AUTO: 4.18 MILLION/UL (ref 3.81–5.12)
SODIUM SERPL-SCNC: 137 MMOL/L (ref 136–145)
WBC # BLD AUTO: 10.71 THOUSAND/UL (ref 4.31–10.16)

## 2019-12-04 PROCEDURE — 90682 RIV4 VACC RECOMBINANT DNA IM: CPT | Performed by: ORTHOPAEDIC SURGERY

## 2019-12-04 PROCEDURE — 99232 SBSQ HOSP IP/OBS MODERATE 35: CPT | Performed by: INTERNAL MEDICINE

## 2019-12-04 PROCEDURE — 80048 BASIC METABOLIC PNL TOTAL CA: CPT | Performed by: INTERNAL MEDICINE

## 2019-12-04 PROCEDURE — 90471 IMMUNIZATION ADMIN: CPT | Performed by: ORTHOPAEDIC SURGERY

## 2019-12-04 PROCEDURE — 99024 POSTOP FOLLOW-UP VISIT: CPT | Performed by: ORTHOPAEDIC SURGERY

## 2019-12-04 PROCEDURE — 85027 COMPLETE CBC AUTOMATED: CPT | Performed by: INTERNAL MEDICINE

## 2019-12-04 RX ADMIN — ACETAMINOPHEN 650 MG: 325 TABLET, FILM COATED ORAL at 05:30

## 2019-12-04 RX ADMIN — ACETAMINOPHEN 650 MG: 325 TABLET, FILM COATED ORAL at 12:52

## 2019-12-04 RX ADMIN — NICOTINE 1 PATCH: 14 PATCH TRANSDERMAL at 10:36

## 2019-12-04 RX ADMIN — LORATADINE 10 MG: 10 TABLET ORAL at 10:35

## 2019-12-04 RX ADMIN — GUAIFENESIN 600 MG: 600 TABLET ORAL at 21:31

## 2019-12-04 RX ADMIN — GUAIFENESIN 600 MG: 600 TABLET ORAL at 10:34

## 2019-12-04 RX ADMIN — SODIUM CHLORIDE, SODIUM LACTATE, POTASSIUM CHLORIDE, AND CALCIUM CHLORIDE 100 ML/HR: .6; .31; .03; .02 INJECTION, SOLUTION INTRAVENOUS at 06:09

## 2019-12-04 RX ADMIN — VANCOMYCIN HYDROCHLORIDE 750 MG: 750 INJECTION, SOLUTION INTRAVENOUS at 10:47

## 2019-12-04 RX ADMIN — INFLUENZA A VIRUS A/BRISBANE/02/2018 (H1N1) RECOMBINANT HEMAGGLUTININ ANTIGEN, INFLUENZA A VIRUS A/KANSAS/14/2017 (H3N2) RECOMBINANT HEMAGGLUTININ ANTIGEN, INFLUENZA B VIRUS B/PHUKET/3073/2013 RECOMBINANT HEMAGGLUTININ ANTIGEN, AND INFLUENZA B VIRUS B/MARYLAND/15/2016 RECOMBINANT HEMAGGLUTININ ANTIGEN 0.5 ML: 45; 45; 45; 45 INJECTION INTRAMUSCULAR at 10:36

## 2019-12-04 RX ADMIN — SENNOSIDES 8.6 MG: 8.6 TABLET, FILM COATED ORAL at 21:31

## 2019-12-04 RX ADMIN — ACETAMINOPHEN 650 MG: 325 TABLET, FILM COATED ORAL at 18:24

## 2019-12-04 RX ADMIN — ENOXAPARIN SODIUM 40 MG: 40 INJECTION SUBCUTANEOUS at 12:53

## 2019-12-04 RX ADMIN — ACETAMINOPHEN 650 MG: 325 TABLET, FILM COATED ORAL at 00:57

## 2019-12-04 RX ADMIN — VANCOMYCIN HYDROCHLORIDE 750 MG: 750 INJECTION, SOLUTION INTRAVENOUS at 21:32

## 2019-12-04 NOTE — PROGRESS NOTES
Yuly Talbot  62 y o   female  1960  mrn [id number]    Assessment/Plan: 1  Septic monoarticular arthritis: L shoulder:  WBC > 100,000 > 90 % neutrophils, POD # 1, arthroscopic washout  ER specimen and OR cultures negative thus far  Supect OR cultures to be negative as on Vancomycin  Will need at least 4 days of IV therapy, possibly longer depending on the organism     - cont vancomycin  - await cultures  - check sed rate,  crp      Subjective:  S/p washout, still with some pain, no fevers    Objective:    r shoulder dressed  Cor: rrr s1s2  Lungs: cta bilaterally  Abd: soft nt/nd + BS      Labs:  CBC w/diff  Recent Labs     12/03/19  0540 12/04/19  0507   WBC 11 72* 10 71*   HGB 12 7 12 4   HCT 38 5 38 3    198   NEUTOPHILPCT 73  --    LYMPHOPCT 14  --    MONOPCT 12  --    EOSPCT 1  --      BMP  Recent Labs     12/04/19  0507   K 4 5      CO2 24   BUN 13   CREATININE 0 81   CALCIUM 8 8     CMP  Recent Labs     12/04/19  0507   K 4 5      CO2 24   BUN 13   CREATININE 0 81   CALCIUM 8 8        labrc    Cultures:  Lab Results   Component Value Date    BLOODCX Received in Microbiology Lab  Culture in Progress  12/03/2019    BLOODCX Received in Microbiology Lab  Culture in Progress   12/03/2019     No results found for: WOUNDCULT  No results found for: URINECX  No results found for: SPUTUMCULTUR    MED: reviewed      Current Facility-Administered Medications:     acetaminophen (TYLENOL) tablet 650 mg, 650 mg, Oral, Q6H [address], Imelda Hardin MD, 650 mg at 12/04/19 0530    guaiFENesin (MUCINEX) 12 hr tablet 600 mg, 600 mg, Oral, Q12H [address], Mari Clements, RELLNP, 600 mg at 12/03/19 2120    HYDROmorphone (DILAUDID) injection 0 2 mg, 0 2 mg, Intravenous, Q4H PRN, Imelda Hardin MD    influenza vaccine, recombinant, quadrivalent (FLUBLOK) IM injection 0 5 mL, 0 5 mL, Intramuscular, Once, Imelda Hardin MD    lactated ringers infusion, 100 mL/hr, Intravenous, Continuous, Estefania Power Ishmael Laws MD, Last Rate: 100 mL/hr at 12/04/19 0609, 100 mL/hr at 12/04/19 0609    loratadine (CLARITIN) tablet 10 mg, 10 mg, Oral, Daily, SADIQ Lewis    nicotine (NICODERM CQ) 14 mg/24hr TD 24 hr patch 1 patch, 1 patch, Transdermal, Daily, Beatrix Peabody, MD, 1 patch at 12/03/19 0834    ondansetron (ZOFRAN) injection 4 mg, 4 mg, Intravenous, Q4H PRN, Beatrix Peabody, MD, 4 mg at 12/03/19 1329    oxyCODONE (ROXICODONE) IR tablet 2 5 mg, 2 5 mg, Oral, Q4H PRN **OR** oxyCODONE (ROXICODONE) IR tablet 5 mg, 5 mg, Oral, Q4H PRN, Beatrix Peabody, MD    [facility]) tablet 8 6 mg, 1 tablet, Oral, HS, Beatrix Peabody, MD, 8 6 mg at 12/03/19 2120    vancomycin (VANCOCIN) IVPB (premix) 750 mg, 15 mg/kg, Intravenous, Q12H, Charly Anderson MD    Principal Problem:    Joint infection [facility])  Active Problems:    Leukocytosis    Abnormal finding on CT scan    Tobacco use    Pyogenic arthritis of left shoulder region [facility])      Charly Anderson MD

## 2019-12-04 NOTE — ASSESSMENT & PLAN NOTE
· Septic arthritis of the left shoulder  · Patient presented with left shoulder pain, CT of shoulder revealed subacromial and subdeltoid bursitis  · Joint fluid analysis-  K, 96% neutrophil count   · Procalcitonin 0 08  · WBC 11 72  · Patient remains afebrile, however would leave patient on IV antibiotics by ID recommendations aseptic joint would need about 2 weeks of antibiotics total   · Status post joint washout by Orthopedics, awaiting cultures and sensitivities  ·  Continue with IV antibiotics  · Continue with Tylenol p r n   For pain control

## 2019-12-04 NOTE — PROGRESS NOTES
Progress Note - Michel Watts 1960, 62 y o  female MRN: [id number]    Unit/Bed#: [address] [place] [address] Encounter: [id number]    Primary Care Provider: Gayle Gooden MD   Date and time admitted to hospital: 12/2/2019 10:30 PM        * Septic joint of left shoulder region [facility])  Assessment & Plan  · Septic arthritis of the left shoulder  · Patient presented with left shoulder pain, CT of shoulder revealed subacromial and subdeltoid bursitis  · Joint fluid analysis-  K, 96% neutrophil count   · Procalcitonin 0 08  · WBC 11 72  · Patient remains afebrile, however would leave patient on IV antibiotics by ID recommendations aseptic joint would need about 2 weeks of antibiotics total   · Status post joint washout by Orthopedics, awaiting cultures and sensitivities  ·  Continue with IV antibiotics  · Continue with Tylenol p r n  For pain control    Tobacco use  Assessment & Plan  · Patient states that she smokes about half a pack per day  · Discussed smoking cessation  · Nicotine patch ordered    Abnormal finding on CT scan  Assessment & Plan  · Left shoulder CT imaging revealing subacromial and subdeltoid bursitis with no definitive evidence of a large joint effusion  · Interventions as above    Leukocytosis  Assessment & Plan  · Likely related to above finding  · Interventions as above      VTE Pharmacologic Prophylaxis:   Pharmacologic: Enoxaparin (Lovenox)  Mechanical VTE Prophylaxis in Place: Yes    Patient Centered Rounds: I have performed bedside rounds with nursing staff today  Discussions with Specialists or Other Care Team Provider:  Discussed with Orthopedics and Infectious Disease    Education and Discussions with Family / Patient:  Discussed with patient  She would update her     Time Spent for Care: 45 minutes  More than 50% of total time spent on counseling and coordination of care as described above      Current Length of Stay: 1 day(s)    Current Patient Status: Inpatient Certification Statement: The patient will continue to require additional inpatient hospital stay due to Septic Left shoulder joint, awaiting wound washout cultures    Discharge Plan:  Tomorrow    Code Status: Level 1 - Full Code      Subjective:   No overnight events  Patient reports left shoulder feels much better, although she does develop some tingling in the fingertips whenever she moves it to hard    Objective:     Vitals:   Temp (24hrs), Av 6 °F (36 4 °C), Min:97 °F (36 1 °C), Max:98 °F (36 7 °C)    Temp:  [97 °F (36 1 °C)-98 °F (36 7 °C)] 97 6 °F (36 4 °C)  HR:  [53-98] 57  Resp:  [12-22] 18  BP: (130-173)/() 145/76  SpO2:  [92 %-100 %] 97 %  Body mass index is 18 44 kg/m²  Input and Output Summary (last 24 hours): Intake/Output Summary (Last 24 hours) at 2019 1205  Last data filed at 12/3/2019 1930  Gross per 24 hour   Intake 1768 33 ml   Output    Net 1768 33 ml       Physical Exam:     Physical Exam   Constitutional: She is oriented to person, place, and time  No distress  HENT:   Head: Normocephalic and atraumatic  Eyes: Pupils are equal, round, and reactive to light  EOM are normal    Neck: Normal range of motion  Neck supple  Cardiovascular: Normal rate, regular rhythm and normal heart sounds  Pulmonary/Chest: Effort normal and breath sounds normal  No stridor  No respiratory distress  She has no wheezes  Abdominal: Soft  Bowel sounds are normal  She exhibits no distension  There is no tenderness  There is no guarding  Musculoskeletal: Normal range of motion  She exhibits tenderness (tenderness on moving the shoulder joint with associated tingling in her fingertips)  She exhibits no edema  Neurological: She is alert and oriented to person, place, and time  No cranial nerve deficit  Coordination normal    Skin: Skin is warm  She is not diaphoretic  No erythema  No pallor  Psychiatric: She has a normal mood and affect   Her behavior is normal        Additional Data:     Labs:    Results from last 7 days   Lab Units 12/04/19  0507 12/03/19  0540   WBC Thousand/uL 10 71* 11 72*   HEMOGLOBIN g/dL 12 4 12 7   HEMATOCRIT % 38 3 38 5   PLATELETS Thousands/uL 198 219   NEUTROS PCT %  --  73   LYMPHS PCT %  --  14   MONOS PCT %  --  12   EOS PCT %  --  1     Results from last 7 days   Lab Units 12/04/19  0507   SODIUM mmol/L 137   POTASSIUM mmol/L 4 5   CHLORIDE mmol/L 104   CO2 mmol/L 24   BUN mg/dL 13   CREATININE mg/dL 0 81   ANION GAP mmol/L 9   CALCIUM mg/dL 8 8   GLUCOSE RANDOM mg/dL 94                 Results from last 7 days   Lab Units 12/03/19  0540   PROCALCITONIN ng/ml 0 08           * I Have Reviewed All Lab Data Listed Above  * Additional Pertinent Lab Tests Reviewed: [facility]ry 64 Admission Reviewed    Imaging:    Imaging Reports Reviewed Today Include:  Left shoulder x-ray    Recent Cultures (last 7 days):     Results from last 7 days   Lab Units 12/03/19  1320 12/03/19  1315 12/03/19  1128 12/03/19  1123 12/03/19  0201   BLOOD CULTURE   --   --  Received in Microbiology Lab  Culture in Progress  Received in Microbiology Lab  Culture in Progress    --    GRAM STAIN RESULT  No Polys or Bacteria seen No Polys or Bacteria seen  --   --  4+ Polys  No bacteria seen   BODY FLUID CULTURE, STERILE   --   --   --   --  No growth       Last 24 Hours Medication List:     Current Facility-Administered Medications:  acetaminophen 650 mg Oral Q6H Xander Ibrahim MD    enoxaparin 40 mg Subcutaneous Daily Chastity Heath MD    guaiFENesin 600 mg Oral Q12H [facility] SADIQ Rojas    HYDROmorphone 0 2 mg Intravenous Q4H PRN Timothy Verdugo MD    lactated ringers 100 mL/hr Intravenous Continuous Timothy Verdugo MD Last Rate: 100 mL/hr (12/04/19 0609)   loratadine 10 mg Oral Daily SADIQ Rojas    nicotine 1 patch Transdermal Daily Timothy Verdugo MD    ondansetron 4 mg Intravenous Q4H PRN Timothy Verdugo MD    oxyCODONE 2 5 mg Oral Q4H PRN Nova Bajwa MD    Or        oxyCODONE 5 mg Oral Q4H PRN Nova Bajwa MD    senna 1 tablet Oral HS Nova Bajwa MD    vancomycin 15 mg/kg Intravenous Q12H Catalina Castorena MD Last Rate: 750 mg (12/04/19 1047)        Today, Patient Was Seen By: Rodger Anderson MD    ** Please Note: Dictation voice to text software may have been used in the creation of this document   **

## 2019-12-04 NOTE — SOCIAL WORK
LOS: 1  Pt is not a documented bundle  Pt is not a 30 day readmission  Met with Pt  Pt presents AA&Ox3  Discussed role of case management, discharge planning, identifying help at home and discharge preference  Pt reports she lives with her  and 's Uncle in [place], [place]  Pt reports she is independent with adls and ambulation  Pt reports she uses [address] in [facility]  Pt reports she does not have insurance and obtains prescriptions that she can afford  Pt reports denies hx of VNA and SNF  Pt denies hx of mental health and drug and alcohol treatment  Pt reports she does not have living will or POA and is agreeable for information  Pt reports she and her  drives and goes grocery shopping  Pt reports her  helps her at home if needed  Pt reports her  or her 's [facility] will transport her home upon discharge  Will follow for discharge planning and if Pt will need indigent meds upon discharge  Acknowledge Pt is currently on IV antibiotics  Referral sent to PATHS  Information for living will/POA given to Pt

## 2019-12-04 NOTE — PROGRESS NOTES
Othopaedic Surgery Inpatient Progress Note   POD 1    Interval Events:   "My shoulder feels much better than yesterday "    Objective:   Temp:  [97 °F (36 1 °C)-98 °F (36 7 °C)] 97 6 °F (36 4 °C)  HR:  [53-98] 72  Resp:  [12-22] 18  BP: (130-173)/() 137/67  General:   No Acute Distress  Patient Alert, Awake, and Oriented  Respiratory: Non-labored breathing    Focused Exam:   Dressing C/D/I  Upper Extremity:   Sensation Intact to Light Touch in Radial, Median, Ulnar Nerve Distribution  Motor function 5 out of 5 strength in AIN, PIN, ulnar  Abduction 90  FF 90   Extremity Warm and Well Perfused  Brisk Capillary Refill in Toes  Labs:   OR cultures x2 - NGTD       Assessment: 62 y o  female now status post L shoulder arthroscopic I&D completed on 12/3/19  Doing well this morning  Plan:  Incision: Okay to remove dressing tomorrow  Keep steristrips in place  Pain Control: Continue per pain protocol  Weight Bearing: Weight bearing as tolerated on affected extremity  ROM as tolerated  DVT Prophylaxis: per medicine team, SCD's, early ambulation  Antibiotics: Abx to be discontinued after 3 doses per perioperative guidelines  Will defer to ID recs for abx therapy for septic shoulder  GI: Plan for aggressive bowel regimen to prevent constipation from narcotic medications  Lines: HLIV once tolerating PO  PT/OT  Dispo: Discharge per medicine team   Will arrange for outpatient followup with ortho  Vivek Treviño MD  Adult Reconstruction  Department of Orthopaedic Surgery  [address]  8:04 AM

## 2019-12-04 NOTE — PLAN OF CARE
Problem: PAIN - ADULT  Goal: Verbalizes/displays adequate comfort level or baseline comfort level  Description  Interventions:  - Encourage patient to monitor pain and request assistance  - Assess pain using appropriate pain scale  - Administer analgesics based on type and severity of pain and evaluate response  - Implement non-pharmacological measures as appropriate and evaluate response  - Consider cultural and social influences on pain and pain management  - Notify physician/advanced practitioner if interventions unsuccessful or patient reports new pain  Outcome: Adequate for Discharge     Problem: INFECTION - ADULT  Goal: Absence or prevention of progression during hospitalization  Description  INTERVENTIONS:  - Assess and monitor for signs and symptoms of infection  - Monitor lab/diagnostic results  - Monitor all insertion sites, i e  indwelling lines, tubes, and drains  - Monitor endotracheal if appropriate and nasal secretions for changes in amount and color  - [place] appropriate cooling/warming therapies per order  - Administer medications as ordered  - Instruct and encourage patient and family to use good hand hygiene technique  - Identify and instruct in appropriate isolation precautions for identified infection/condition  Outcome: Adequate for Discharge  Goal: Absence of fever/infection during neutropenic period  Description  INTERVENTIONS:  - Monitor WBC    Outcome: Adequate for Discharge     Problem: SAFETY ADULT  Goal: Patient will remain free of falls  Description  INTERVENTIONS:  - Assess patient frequently for physical needs  -  Identify cognitive and physical deficits and behaviors that affect risk of falls    -  [place] fall precautions as indicated by assessment   - Educate patient/family on patient safety including physical limitations  - Instruct patient to call for assistance with activity based on assessment  - Modify environment to reduce risk of injury  - Consider OT/PT consult to assist with strengthening/mobility  Outcome: Adequate for Discharge  Goal: Maintain or return to baseline ADL function  Description  INTERVENTIONS:  -  Assess patient's ability to carry out ADLs; assess patient's baseline for ADL function and identify physical deficits which impact ability to perform ADLs (bathing, care of mouth/teeth, toileting, grooming, dressing, etc )  - Assess/evaluate cause of self-care deficits   - Assess range of motion  - Assess patient's mobility; develop plan if impaired  - Assess patient's need for assistive devices and provide as appropriate  - Encourage maximum independence but intervene and supervise when necessary  - Involve family in performance of ADLs  - Assess for home care needs following discharge   - Consider OT consult to assist with ADL evaluation and planning for discharge  - Provide patient education as appropriate  Outcome: Adequate for Discharge  Goal: Maintain or return mobility status to optimal level  Description  INTERVENTIONS:  - Assess patient's baseline mobility status (ambulation, transfers, stairs, etc )    - Identify cognitive and physical deficits and behaviors that affect mobility  - Identify mobility aids required to assist with transfers and/or ambulation (gait belt, sit-to-stand, lift, walker, cane, etc )  - [place] fall precautions as indicated by assessment  - Record patient progress and toleration of activity level on Mobility SBAR; progress patient to next Phase/Stage  - Instruct patient to call for assistance with activity based on assessment  - Consider rehabilitation consult to assist with strengthening/weightbearing, etc   Outcome: Adequate for Discharge     Problem: DISCHARGE PLANNING  Goal: Discharge to home or other facility with appropriate resources  Description  INTERVENTIONS:  - Identify barriers to discharge w/patient and caregiver  - Arrange for needed discharge resources and transportation as appropriate  - Identify discharge learning needs (meds, wound care, etc )  - Arrange for interpretive services to assist at discharge as needed  - Refer to Case Management Department for coordinating discharge planning if the patient needs post-hospital services based on physician/advanced practitioner order or complex needs related to functional status, cognitive ability, or social support system  Outcome: Adequate for Discharge     Problem: Knowledge Deficit  Goal: Patient/family/caregiver demonstrates understanding of disease process, treatment plan, medications, and discharge instructions  Description  Complete learning assessment and assess knowledge base  Interventions:  - Provide teaching at level of understanding  - Provide teaching via preferred learning methods  Outcome: Adequate for Discharge     Problem: SKIN/TISSUE INTEGRITY - ADULT  Goal: Incision(s), wounds(s) or drain site(s) healing without S/S of infection  Description  INTERVENTIONS  - Assess and document risk factors for skin impairment   - Assess and document dressing, incision, wound bed, drain sites and surrounding tissue  - Consider nutrition services referral as needed  - Oral mucous membranes remain intact  - Provide patient/ family education  Outcome: Adequate for Discharge     Problem: Nutrition/Hydration-ADULT  Goal: Nutrient/Hydration intake appropriate for improving, restoring or maintaining nutritional needs  Description  Monitor and assess patient's nutrition/hydration status for malnutrition  Collaborate with interdisciplinary team and initiate plan and interventions as ordered  Monitor patient's weight and dietary intake as ordered or per policy  Utilize nutrition screening tool and intervene as necessary  Determine patient's food preferences and provide high-protein, high-caloric foods as appropriate       INTERVENTIONS:  - Monitor oral intake, urinary output, labs, and treatment plans  - Assess nutrition and hydration status and recommend course of action  - Evaluate amount of meals eaten  - Assist patient with eating if necessary   - Allow adequate time for meals  - Recommend/ encourage appropriate diets, oral nutritional supplements, and vitamin/mineral supplements  - Order, calculate, and assess calorie counts as needed  - Recommend, monitor, and adjust tube feedings and TPN/PPN based on assessed needs  - Assess need for intravenous fluids  - Provide specific nutrition/hydration education as appropriate  - Include patient/family/caregiver in decisions related to nutrition  Outcome: Adequate for Discharge

## 2019-12-05 LAB
ANION GAP SERPL CALCULATED.3IONS-SCNC: 7 MMOL/L (ref 4–13)
BUN SERPL-MCNC: 9 MG/DL (ref 5–25)
CALCIUM SERPL-MCNC: 8.6 MG/DL (ref 8.3–10.1)
CHLORIDE SERPL-SCNC: 107 MMOL/L (ref 100–108)
CO2 SERPL-SCNC: 26 MMOL/L (ref 21–32)
CREAT SERPL-MCNC: 0.83 MG/DL (ref 0.6–1.3)
CRP SERPL QL: 39.6 MG/L
ERYTHROCYTE [DISTWIDTH] IN BLOOD BY AUTOMATED COUNT: 12.5 % (ref 11.6–15.1)
ERYTHROCYTE [SEDIMENTATION RATE] IN BLOOD: 13 MM/HOUR (ref 0–15)
GFR SERPL CREATININE-BSD FRML MDRD: 76 ML/MIN/1.73SQ M
GLUCOSE SERPL-MCNC: 91 MG/DL (ref 65–140)
HCT VFR BLD AUTO: 37.3 % (ref 34.8–46.1)
HGB BLD-MCNC: 12.1 G/DL (ref 11.5–15.4)
MCH RBC QN AUTO: 29.3 PG (ref 26.8–34.3)
MCHC RBC AUTO-ENTMCNC: 32.4 G/DL (ref 31.4–37.4)
MCV RBC AUTO: 90 FL (ref 82–98)
PLATELET # BLD AUTO: 196 THOUSANDS/UL (ref 149–390)
PMV BLD AUTO: 10.3 FL (ref 8.9–12.7)
POTASSIUM SERPL-SCNC: 4.3 MMOL/L (ref 3.5–5.3)
RBC # BLD AUTO: 4.13 MILLION/UL (ref 3.81–5.12)
SODIUM SERPL-SCNC: 140 MMOL/L (ref 136–145)
WBC # BLD AUTO: 6.04 THOUSAND/UL (ref 4.31–10.16)

## 2019-12-05 PROCEDURE — 85652 RBC SED RATE AUTOMATED: CPT | Performed by: INTERNAL MEDICINE

## 2019-12-05 PROCEDURE — 86140 C-REACTIVE PROTEIN: CPT | Performed by: INTERNAL MEDICINE

## 2019-12-05 PROCEDURE — 85027 COMPLETE CBC AUTOMATED: CPT | Performed by: INTERNAL MEDICINE

## 2019-12-05 PROCEDURE — 86618 LYME DISEASE ANTIBODY: CPT | Performed by: INTERNAL MEDICINE

## 2019-12-05 PROCEDURE — 99232 SBSQ HOSP IP/OBS MODERATE 35: CPT | Performed by: INTERNAL MEDICINE

## 2019-12-05 PROCEDURE — 80048 BASIC METABOLIC PNL TOTAL CA: CPT | Performed by: INTERNAL MEDICINE

## 2019-12-05 PROCEDURE — 99024 POSTOP FOLLOW-UP VISIT: CPT | Performed by: ORTHOPAEDIC SURGERY

## 2019-12-05 RX ORDER — TRAMADOL HYDROCHLORIDE 50 MG/1
50 TABLET ORAL EVERY 6 HOURS PRN
Status: DISCONTINUED | OUTPATIENT
Start: 2019-12-05 | End: 2019-12-06 | Stop reason: HOSPADM

## 2019-12-05 RX ADMIN — TRAMADOL HYDROCHLORIDE 50 MG: 50 TABLET, FILM COATED ORAL at 13:48

## 2019-12-05 RX ADMIN — ACETAMINOPHEN 650 MG: 325 TABLET, FILM COATED ORAL at 00:59

## 2019-12-05 RX ADMIN — NICOTINE 1 PATCH: 14 PATCH TRANSDERMAL at 09:16

## 2019-12-05 RX ADMIN — VANCOMYCIN HYDROCHLORIDE 750 MG: 750 INJECTION, SOLUTION INTRAVENOUS at 10:39

## 2019-12-05 RX ADMIN — ACETAMINOPHEN 650 MG: 325 TABLET, FILM COATED ORAL at 13:33

## 2019-12-05 RX ADMIN — DAPTOMYCIN 325 MG: 500 INJECTION, POWDER, LYOPHILIZED, FOR SOLUTION INTRAVENOUS at 13:48

## 2019-12-05 RX ADMIN — ACETAMINOPHEN 650 MG: 325 TABLET, FILM COATED ORAL at 05:36

## 2019-12-05 RX ADMIN — ENOXAPARIN SODIUM 40 MG: 40 INJECTION SUBCUTANEOUS at 13:34

## 2019-12-05 RX ADMIN — LORATADINE 10 MG: 10 TABLET ORAL at 09:14

## 2019-12-05 RX ADMIN — GUAIFENESIN 600 MG: 600 TABLET ORAL at 21:15

## 2019-12-05 RX ADMIN — SENNOSIDES 8.6 MG: 8.6 TABLET, FILM COATED ORAL at 21:15

## 2019-12-05 RX ADMIN — GUAIFENESIN 600 MG: 600 TABLET ORAL at 09:14

## 2019-12-05 RX ADMIN — ACETAMINOPHEN 650 MG: 325 TABLET, FILM COATED ORAL at 23:44

## 2019-12-05 NOTE — PLAN OF CARE
Problem: PAIN - ADULT  Goal: Verbalizes/displays adequate comfort level or baseline comfort level  Description  Interventions:  - Encourage patient to monitor pain and request assistance  - Assess pain using appropriate pain scale  - Administer analgesics based on type and severity of pain and evaluate response  - Implement non-pharmacological measures as appropriate and evaluate response  - Consider cultural and social influences on pain and pain management  - Notify physician/advanced practitioner if interventions unsuccessful or patient reports new pain  Outcome: Progressing     Problem: INFECTION - ADULT  Goal: Absence or prevention of progression during hospitalization  Description  INTERVENTIONS:  - Assess and monitor for signs and symptoms of infection  - Monitor lab/diagnostic results  - Monitor all insertion sites, i e  indwelling lines, tubes, and drains  - Monitor endotracheal if appropriate and nasal secretions for changes in amount and color  - [place] appropriate cooling/warming therapies per order  - Administer medications as ordered  - Instruct and encourage patient and family to use good hand hygiene technique  - Identify and instruct in appropriate isolation precautions for identified infection/condition  Outcome: Progressing  Goal: Absence of fever/infection during neutropenic period  Description  INTERVENTIONS:  - Monitor WBC    Outcome: Progressing     Problem: SAFETY ADULT  Goal: Patient will remain free of falls  Description  INTERVENTIONS:  - Assess patient frequently for physical needs  -  Identify cognitive and physical deficits and behaviors that affect risk of falls    -  [place] fall precautions as indicated by assessment   - Educate patient/family on patient safety including physical limitations  - Instruct patient to call for assistance with activity based on assessment  - Modify environment to reduce risk of injury  - Consider OT/PT consult to assist with strengthening/mobility  Outcome: Progressing  Goal: Maintain or return to baseline ADL function  Description  INTERVENTIONS:  -  Assess patient's ability to carry out ADLs; assess patient's baseline for ADL function and identify physical deficits which impact ability to perform ADLs (bathing, care of mouth/teeth, toileting, grooming, dressing, etc )  - Assess/evaluate cause of self-care deficits   - Assess range of motion  - Assess patient's mobility; develop plan if impaired  - Assess patient's need for assistive devices and provide as appropriate  - Encourage maximum independence but intervene and supervise when necessary  - Involve family in performance of ADLs  - Assess for home care needs following discharge   - Consider OT consult to assist with ADL evaluation and planning for discharge  - Provide patient education as appropriate  Outcome: Progressing  Goal: Maintain or return mobility status to optimal level  Description  INTERVENTIONS:  - Assess patient's baseline mobility status (ambulation, transfers, stairs, etc )    - Identify cognitive and physical deficits and behaviors that affect mobility  - Identify mobility aids required to assist with transfers and/or ambulation (gait belt, sit-to-stand, lift, walker, cane, etc )  - [place] fall precautions as indicated by assessment  - Record patient progress and toleration of activity level on Mobility SBAR; progress patient to next Phase/Stage  - Instruct patient to call for assistance with activity based on assessment  - Consider rehabilitation consult to assist with strengthening/weightbearing, etc   Outcome: Progressing     Problem: DISCHARGE PLANNING  Goal: Discharge to home or other facility with appropriate resources  Description  INTERVENTIONS:  - Identify barriers to discharge w/patient and caregiver  - Arrange for needed discharge resources and transportation as appropriate  - Identify discharge learning needs (meds, wound care, etc )  - Arrange for interpretive services to assist at discharge as needed  - Refer to Case Management Department for coordinating discharge planning if the patient needs post-hospital services based on physician/advanced practitioner order or complex needs related to functional status, cognitive ability, or social support system  Outcome: Progressing     Problem: Knowledge Deficit  Goal: Patient/family/caregiver demonstrates understanding of disease process, treatment plan, medications, and discharge instructions  Description  Complete learning assessment and assess knowledge base  Interventions:  - Provide teaching at level of understanding  - Provide teaching via preferred learning methods  Outcome: Progressing     Problem: SKIN/TISSUE INTEGRITY - ADULT  Goal: Incision(s), wounds(s) or drain site(s) healing without S/S of infection  Description  INTERVENTIONS  - Assess and document risk factors for skin impairment   - Assess and document dressing, incision, wound bed, drain sites and surrounding tissue  - Consider nutrition services referral as needed  - Oral mucous membranes remain intact  - Provide patient/ family education  Outcome: Progressing     Problem: Nutrition/Hydration-ADULT  Goal: Nutrient/Hydration intake appropriate for improving, restoring or maintaining nutritional needs  Description  Monitor and assess patient's nutrition/hydration status for malnutrition  Collaborate with interdisciplinary team and initiate plan and interventions as ordered  Monitor patient's weight and dietary intake as ordered or per policy  Utilize nutrition screening tool and intervene as necessary  Determine patient's food preferences and provide high-protein, high-caloric foods as appropriate       INTERVENTIONS:  - Monitor oral intake, urinary output, labs, and treatment plans  - Assess nutrition and hydration status and recommend course of action  - Evaluate amount of meals eaten  - Assist patient with eating if necessary   - Allow adequate time for meals  - Recommend/ encourage appropriate diets, oral nutritional supplements, and vitamin/mineral supplements  - Order, calculate, and assess calorie counts as needed  - Recommend, monitor, and adjust tube feedings and TPN/PPN based on assessed needs  - Assess need for intravenous fluids  - Provide specific nutrition/hydration education as appropriate  - Include patient/family/caregiver in decisions related to nutrition  Outcome: Progressing

## 2019-12-05 NOTE — ASSESSMENT & PLAN NOTE
· Leukocytosis secondary to septic left shoulder  · Leukocytosis resolved, WBC 6 K today  · Interventions as above

## 2019-12-05 NOTE — PROGRESS NOTES
Carlos Ybarra  62 y o   female  1960  mrn [id number]    Assessment/Plan: 1  Septic monoarticular arthritis: L shoulder:  WBC > 100,000 > 90 % neutrophils, POD # 2, arthroscopic washout  ER specimen and OR cultures negative thus far  Supect OR cultures to be negative as on Vancomycin  Will need at least 2 weeks of IV therapy, if cultures are negative  D/W patient and case management prefer once a day dosing so she can go to infusion center        - change to IV Daptomycin 6/mg/kg IV Q 24 hours, cpk ok  - place picc line  - await final cultures  - follow cbc and fever curve     Subjective:  No fevers, wants to go home  Does not have insurance, minimal shoulder pain  Objective:    L shoulder: arthroscopy sites are clean  Cor: rrr s1s2  Lungs: cta bilaterally      Labs:  CBC w/diff  Recent Labs     12/03/19  0540  12/05/19  0451   WBC 11 72*   < > 6 04   HGB 12 7   < > 12 1   HCT 38 5   < > 37 3      < > 196   NEUTOPHILPCT 73  --   --    LYMPHOPCT 14  --   --    MONOPCT 12  --   --    EOSPCT 1  --   --     < > = values in this interval not displayed       BMP  Recent Labs     12/05/19  0451   K 4 3      CO2 26   BUN 9   CREATININE 0 83   CALCIUM 8 6     CMP  Recent Labs     12/05/19 0451   K 4 3      CO2 26   BUN 9   CREATININE 0 83   CALCIUM 8 6        labrc    Cultures:  Lab Results   Component Value Date    BLOODCX No Growth at 24 hrs  12/03/2019    BLOODCX No Growth at 24 hrs  12/03/2019     No results found for: WOUNDCULT  No results found for: URINECX  No results found for: SPUTUMCULTUR    MED: reviewed      Current Facility-Administered Medications:     acetaminophen (TYLENOL) tablet 650 mg, 650 mg, Oral, Q6H [address], Lisa Powell MD, 650 mg at 12/05/19 0536    DAPTOmycin (CUBICIN) 325 mg in sodium chloride 0 9 % 50 mL IVPB, 6 mg/kg, Intravenous, Q24H, Kristine Quiñonez MD    enoxaparin (LOVENOX) subcutaneous injection 40 mg, 40 mg, Subcutaneous, Daily, Zora Barragan, MD, 40 mg at 12/04/19 1253    guaiFENesin (MUCINEX) 12 hr tablet 600 mg, 600 mg, Oral, Q12H [facility], SADIQ Araiza, 600 mg at 12/05/19 0914    HYDROmorphone (DILAUDID) injection 0 2 mg, 0 2 mg, Intravenous, Q4H PRN, Nova Bajwa MD    loratadine (CLARITIN) tablet 10 mg, 10 mg, Oral, Daily, SADIQ Araiza, 10 mg at 12/05/19 0914    nicotine (NICODERM CQ) 14 mg/24hr TD 24 hr patch 1 patch, 1 patch, Transdermal, Daily, Nova Bajwa MD, 1 patch at 12/05/19 0916    ondansetron [facility]) injection 4 mg, 4 mg, Intravenous, Q4H PRN, Nova Bajwa MD, 4 mg at 12/03/19 1329    oxyCODONE (ROXICODONE) IR tablet 2 5 mg, 2 5 mg, Oral, Q4H PRN **OR** oxyCODONE (ROXICODONE) IR tablet 5 mg, 5 mg, Oral, Q4H PRN, Nova Bajwa MD    [facility]) tablet 8 6 mg, 1 tablet, Oral, HS, Nova Bajwa MD, 8 6 mg at 12/04/19 2131    Principal Problem:    Septic joint of left shoulder region [facility])  Active Problems:    Leukocytosis    Abnormal finding on CT scan    Tobacco use    Pyogenic arthritis of left shoulder region [facility])      Catalina Castorena MD

## 2019-12-05 NOTE — ASSESSMENT & PLAN NOTE
· Septic arthritis of the left shoulder  · Patient presented with left shoulder pain, CT of shoulder revealed subacromial and subdeltoid bursitis  · Joint fluid analysis-  K, 96% neutrophil count   · Procalcitonin 0 08  ·  Status post joint washout by Orthopedics  · Joint cultures and sensitivities is negative for any organism  · Switch IV vancomycin to IV daptomycin 6 milligram/kilogram IV Q 24 hourly for a total of 2 weeks  ·  Continue with IV antibiotics  · Continue with Tylenol, oxycodone and Dilaudid p r n   For pain control

## 2019-12-05 NOTE — PROGRESS NOTES
Vancomycin Assessment    Mayi Bhat is a 62 y o  female who is currently receiving Vancomycin 750mg (15mg/kg) IV Q12H  Relevant clinical data and objective history reviewed:  Creatinine   Date Value Ref Range Status   12/05/2019 0 83 0 60 - 1 30 mg/dL Final     Comment:     Standardized to IDMS reference method   12/04/2019 0 81 0 60 - 1 30 mg/dL Final     Comment:     Standardized to IDMS reference method   12/02/2019 1 11 0 60 - 1 30 mg/dL Final     Comment:     Standardized to IDMS reference method     /72 (BP Location: Right arm)   Pulse (!) 52   Temp 97 5 °F (36 4 °C) (Oral)   Resp 18   Ht 5' 7" (1 702 m)   Wt 53 4 kg (117 lb 11 6 oz)   SpO2 96%   BMI 18 44 kg/m²   I/O last 3 completed shifts: In: 1048 3 [P O :480; I V :568 3]  Out: -   Lab Results   Component Value Date/Time    BUN 9 12/05/2019 04:51 AM    WBC 6 04 12/05/2019 04:51 AM    HGB 12 1 12/05/2019 04:51 AM    HCT 37 3 12/05/2019 04:51 AM    MCV 90 12/05/2019 04:51 AM     12/05/2019 04:51 AM     Temp Readings from Last 3 Encounters:   12/05/19 97 5 °F (36 4 °C) (Oral)   05/14/18 98 2 °F (36 8 °C) (Temporal)   01/15/14 (!) 96 °F (35 6 °C) (Tympanic)     Vancomycin Days of Therapy: 3    Assessment/Plan  The patient is currently on vancomycin utilizing scheduled dosing based on actual body weight  Baseline risks associated with therapy include: concomitant nephrotoxic medications  The patient is currently receiving Vancomycin 750mg (15mg/kg) IV Q12H and after clinical evaluation will be changed to Vancomycin 750mg (12 5mg/kg) IV Q12H  Pharmacy will also follow closely for s/sx of nephrotoxicity, infusion reactions and appropriateness of therapy  BMP and CBC will be ordered per protocol  As patient approaches steady state, a Vancomycin trough will be ordered for 12/6/19 @1015 (prior to the 5th dose)  Due to infection severity, prescriber ordered a target a trough of 15-20 (appropriate for most indications)     Pharmacy will continue to follow the patients culture results and clinical progress daily      Jojo Bae, Pharmacist

## 2019-12-05 NOTE — SOCIAL WORK
Continue to follow  Met with Pt and Pt's  with Dr Mkiaela Love this afternoon  Pt and Pt's  choosing infusion center for outpt IV antibiotics  Dr Mikaela Love informed  that Pt can be on IV dapto 6mg IV q 24 until 12/18  Call placed to [facility] H[facility]t in infusion [id number](), can accept Pt MA pending and IV dapto q 24  Call placed to Dr Mikaela Love, faxed physician order sheet to Dr Mikaela Love to complete and for  to fax to [facility] H[facility]t in infusion center  Received fax from Dr Mikaela Love  Faxed physician order sheet to Jah Hoyt in infusion center  Pt and Pt's  aware that they will have to go to [facility] infusion center on Saturdays and Sundays  Pt and Pt's  aware they can go to [facility] infusion Mon through Fri  Await schedule for IV antibiotics at [facility] infusion center  Will follow

## 2019-12-05 NOTE — PROGRESS NOTES
Vancomycin IV Pharmacy-to-Dose Consultation    Martina Robles is a 62 y o  female who is no longer currently receiving Vancomycin IV   It has been discontinued by the provider  Pharmacy will sign off with respect to Vancomycin dosing and management  The previously ordered Vancomycin trough and Pharmacy general consult will also be discontinued  Thank you for the opportunity to provide Pharmacy Consult services       Abdirashid Merritt, Pharmacist

## 2019-12-05 NOTE — PROGRESS NOTES
Othopaedic Surgery Inpatient Progress Note   POD 2    Interval Events:   "My shoulder feels better  This dressing is bulky"    Objective:   Temp:  [97 5 °F (36 4 °C)-98 °F (36 7 °C)] 97 5 °F (36 4 °C)  HR:  [45-57] 52  Resp:  [18] 18  BP: (133-167)/(72-84) 167/72  General:   No Acute Distress  Patient Alert, Awake, and Oriented  Respiratory: Non-labored breathing    Focused Exam:   Dressing removed, steri-strips in place, incisions C/D/I  Upper Extremity:   Abduction 90  FF 90   Extremity Warm and Well Perfused  Brisk Capillary Refill in Toes  Labs:   OR cultures x2 - NGTD       Assessment: 62 y o  female now status post L shoulder arthroscopic I&D completed on 12/3/19  Doing well this morning  Plan:  Incision: Keep steristrips in place  Okay to allow water to runover incisions, no submerging  Pain Control: Continue per pain protocol  Weight Bearing: Weight bearing as tolerated on affected extremity  ROM as tolerated  DVT Prophylaxis: per medicine team, SCD's, early ambulation  Antibiotics: Will defer to ID recs for abx therapy for septic shoulder  GI: Plan for aggressive bowel regimen to prevent constipation from narcotic medications  Lines: HLIV once tolerating PO  PT/OT  Dispo: Discharge per medicine team   Will arrange for outpatient followup with maricarmen Treviño MD  [facility] of [address] [place] [address]  9:43 AM

## 2019-12-05 NOTE — PROGRESS NOTES
Progress Note - Veda Matos 1960, 62 y o  female MRN: [id number]    Unit/Bed#: [address] [place] [address] Encounter: [id number]    Primary Care Provider: Preet Donovan MD   Date and time admitted to hospital: 12/2/2019 10:30 PM        * Septic joint of left shoulder region [facility])  Assessment & Plan  · Septic arthritis of the left shoulder  · Patient presented with left shoulder pain, CT of shoulder revealed subacromial and subdeltoid bursitis  · Joint fluid analysis-  K, 96% neutrophil count   · Procalcitonin 0 08  ·  Status post joint washout by Orthopedics  · Joint cultures and sensitivities is negative for any organism  · Switch IV vancomycin to IV daptomycin 6 milligram/kilogram IV Q 24 hourly for a total of 2 weeks  ·  Continue with IV antibiotics  · Continue with Tylenol, oxycodone and Dilaudid p r n  For pain control    Tobacco use  Assessment & Plan  · Patient states that she smokes about half a pack per day  · Discussed smoking cessation  · Nicotine patch ordered    Abnormal finding on CT scan  Assessment & Plan  · Left shoulder CT imaging revealing subacromial and subdeltoid bursitis with no definitive evidence of a large joint effusion  · Interventions as above    Leukocytosis  Assessment & Plan  · Leukocytosis secondary to septic left shoulder  · Leukocytosis resolved, WBC 6 K today  · Interventions as above          VTE Pharmacologic Prophylaxis:   Pharmacologic: Enoxaparin (Lovenox)  Mechanical VTE Prophylaxis in Place: Yes    Patient Centered Rounds: I have performed bedside rounds with nursing staff today  Discussions with Specialists or Other Care Team Provider:  Discussed with Orthopedics and Infectious Disease    Education and Discussions with Family / Patient:  Discussed with patient  She would update her    Time Spent for Care: 45 minutes  More than 50% of total time spent on counseling and coordination of care as described above      Current Length of Stay: 2 day(s)    Current Patient Status: Inpatient   Certification Statement: The patient will continue to require additional inpatient hospital stay due to Left shoulder septic arthritis, need for PICC line placement    Discharge Plan:  Tomorrow    Code Status: Level 1 - Full Code      Subjective:   No overnight events  Patient reports that her shoulder seems stiff, but able to move it and less painful compared to admission    Objective:     Vitals:   Temp (24hrs), Av 7 °F (36 5 °C), Min:97 5 °F (36 4 °C), Max:98 °F (36 7 °C)    Temp:  [97 5 °F (36 4 °C)-98 °F (36 7 °C)] 97 5 °F (36 4 °C)  HR:  [45-52] 52  Resp:  [18] 18  BP: (133-167)/(72-84) 167/72  SpO2:  [96 %-97 %] 96 %  Body mass index is 18 44 kg/m²  Input and Output Summary (last 24 hours):     No intake or output data in the 24 hours ending 19 1330    Physical Exam:     Physical Exam   Constitutional: She is oriented to person, place, and time  She appears well-developed  No distress  Cardiovascular: Normal rate, regular rhythm, normal heart sounds and intact distal pulses  Pulmonary/Chest: Effort normal and breath sounds normal  No stridor  No respiratory distress  She has no wheezes  She has no rales  Abdominal: Soft  Bowel sounds are normal  She exhibits no distension  There is no tenderness  There is no guarding  Musculoskeletal: She exhibits tenderness (Left shoulder tenderness)  She exhibits no edema or deformity  Left shoulder: She exhibits decreased range of motion, tenderness, bony tenderness and decreased strength  She exhibits no swelling, no effusion, no crepitus and no deformity  Neurological: She is alert and oriented to person, place, and time  No cranial nerve deficit  Coordination normal    Skin: Skin is warm and dry  Capillary refill takes less than 2 seconds  No rash noted  She is not diaphoretic  No erythema  No pallor  Psychiatric: She has a normal mood and affect         Additional Data:     Labs:    Results from last 7 days   Lab Units 12/05/19  0451  12/03/19  0540   WBC Thousand/uL 6 04   < > 11 72*   HEMOGLOBIN g/dL 12 1   < > 12 7   HEMATOCRIT % 37 3   < > 38 5   PLATELETS Thousands/uL 196   < > 219   NEUTROS PCT %  --   --  73   LYMPHS PCT %  --   --  14   MONOS PCT %  --   --  12   EOS PCT %  --   --  1    < > = values in this interval not displayed  Results from last 7 days   Lab Units 12/05/19  0451   SODIUM mmol/L 140   POTASSIUM mmol/L 4 3   CHLORIDE mmol/L 107   CO2 mmol/L 26   BUN mg/dL 9   CREATININE mg/dL 0 83   ANION GAP mmol/L 7   CALCIUM mg/dL 8 6   GLUCOSE RANDOM mg/dL 91                 Results from last 7 days   Lab Units 12/03/19  0540   PROCALCITONIN ng/ml 0 08           * I Have Reviewed All Lab Data Listed Above  * Additional Pertinent Lab Tests Reviewed: All Labs Within Last 24 Hours Reviewed      Recent Cultures (last 7 days):     Results from last 7 days   Lab Units 12/03/19  1320 12/03/19  1315 12/03/19  1128 12/03/19  1123 12/03/19  0201   BLOOD CULTURE   --   --  No Growth at 24 hrs   No Growth at 24 hrs   --    GRAM STAIN RESULT  No Polys or Bacteria seen No Polys or Bacteria seen  --   --  4+ Polys  No bacteria seen   BODY FLUID CULTURE, STERILE   --   --   --   --  No growth       Last 24 Hours Medication List:     Current Facility-Administered Medications:  acetaminophen 650 mg Oral Q6H Myriam Eller MD   DAPTOmycin 6 mg/kg Intravenous Q24H Autumn Murrieta MD   enoxaparin 40 mg Subcutaneous Daily Maribel Rodriguez MD   guaiFENesin 600 mg Oral Q12H [facility] SADIQ Mchugh   loratadine 10 mg Oral Daily SADIQ Mchugh   nicotine 1 patch Transdermal Daily Emanuel Vega MD   ondansetron 4 mg Intravenous Q4H PRN Emanuel Vega MD   senna 1 tablet Oral HS Emanuel Vega MD   traMADol 50 mg Oral Q6H PRN Maribel Rodriguez MD        Today, Patient Was Seen By: Maribel Rodriguez MD    ** Please Note: Dictation voice to text software may have been used in the creation of this document   **

## 2019-12-06 VITALS
WEIGHT: 117.73 LBS | TEMPERATURE: 98.4 F | RESPIRATION RATE: 18 BRPM | HEIGHT: 67 IN | DIASTOLIC BLOOD PRESSURE: 72 MMHG | BODY MASS INDEX: 18.48 KG/M2 | HEART RATE: 57 BPM | SYSTOLIC BLOOD PRESSURE: 157 MMHG | OXYGEN SATURATION: 98 %

## 2019-12-06 LAB
ANION GAP SERPL CALCULATED.3IONS-SCNC: 7 MMOL/L (ref 4–13)
B BURGDOR IGG+IGM SER-ACNC: <0.91 ISR (ref 0–0.9)
BACTERIA SPEC ANAEROBE CULT: NO GROWTH
BACTERIA SPEC ANAEROBE CULT: NO GROWTH
BACTERIA SPEC BFLD CULT: NO GROWTH
BACTERIA TISS AEROBE CULT: NO GROWTH
BACTERIA TISS AEROBE CULT: NO GROWTH
BUN SERPL-MCNC: 10 MG/DL (ref 5–25)
CALCIUM SERPL-MCNC: 8.9 MG/DL (ref 8.3–10.1)
CHLORIDE SERPL-SCNC: 106 MMOL/L (ref 100–108)
CO2 SERPL-SCNC: 26 MMOL/L (ref 21–32)
CREAT SERPL-MCNC: 0.83 MG/DL (ref 0.6–1.3)
ERYTHROCYTE [DISTWIDTH] IN BLOOD BY AUTOMATED COUNT: 12.6 % (ref 11.6–15.1)
GFR SERPL CREATININE-BSD FRML MDRD: 76 ML/MIN/1.73SQ M
GLUCOSE SERPL-MCNC: 81 MG/DL (ref 65–140)
GRAM STN SPEC: NORMAL
HCT VFR BLD AUTO: 40.2 % (ref 34.8–46.1)
HGB BLD-MCNC: 13.3 G/DL (ref 11.5–15.4)
MCH RBC QN AUTO: 29.7 PG (ref 26.8–34.3)
MCHC RBC AUTO-ENTMCNC: 33.1 G/DL (ref 31.4–37.4)
MCV RBC AUTO: 90 FL (ref 82–98)
PLATELET # BLD AUTO: 234 THOUSANDS/UL (ref 149–390)
PMV BLD AUTO: 10.5 FL (ref 8.9–12.7)
POTASSIUM SERPL-SCNC: 4 MMOL/L (ref 3.5–5.3)
RBC # BLD AUTO: 4.48 MILLION/UL (ref 3.81–5.12)
SODIUM SERPL-SCNC: 139 MMOL/L (ref 136–145)
WBC # BLD AUTO: 5.16 THOUSAND/UL (ref 4.31–10.16)

## 2019-12-06 PROCEDURE — 80048 BASIC METABOLIC PNL TOTAL CA: CPT | Performed by: INTERNAL MEDICINE

## 2019-12-06 PROCEDURE — 85027 COMPLETE CBC AUTOMATED: CPT | Performed by: INTERNAL MEDICINE

## 2019-12-06 PROCEDURE — 99239 HOSP IP/OBS DSCHRG MGMT >30: CPT | Performed by: INTERNAL MEDICINE

## 2019-12-06 PROCEDURE — 36569 INSJ PICC 5 YR+ W/O IMAGING: CPT

## 2019-12-06 PROCEDURE — C1751 CATH, INF, PER/CENT/MIDLINE: HCPCS

## 2019-12-06 RX ORDER — ACETAMINOPHEN 325 MG/1
650 TABLET ORAL EVERY 6 HOURS SCHEDULED
Qty: 30 TABLET | Refills: 0 | Status: SHIPPED | OUTPATIENT
Start: 2019-12-06 | End: 2021-10-18 | Stop reason: ALTCHOICE

## 2019-12-06 RX ORDER — TRAMADOL HYDROCHLORIDE 50 MG/1
50 TABLET ORAL EVERY 6 HOURS PRN
Qty: 20 TABLET | Refills: 0 | Status: SHIPPED | OUTPATIENT
Start: 2019-12-06 | End: 2019-12-11

## 2019-12-06 RX ORDER — NICOTINE 21 MG/24HR
1 PATCH, TRANSDERMAL 24 HOURS TRANSDERMAL DAILY
Qty: 28 PATCH | Refills: 0 | Status: SHIPPED | OUTPATIENT
Start: 2019-12-07 | End: 2020-09-01

## 2019-12-06 RX ADMIN — LORATADINE 10 MG: 10 TABLET ORAL at 08:26

## 2019-12-06 RX ADMIN — ACETAMINOPHEN 650 MG: 325 TABLET, FILM COATED ORAL at 05:24

## 2019-12-06 RX ADMIN — DAPTOMYCIN 325 MG: 500 INJECTION, POWDER, LYOPHILIZED, FOR SOLUTION INTRAVENOUS at 12:44

## 2019-12-06 RX ADMIN — NICOTINE 1 PATCH: 14 PATCH TRANSDERMAL at 08:26

## 2019-12-06 RX ADMIN — ACETAMINOPHEN 650 MG: 325 TABLET, FILM COATED ORAL at 12:24

## 2019-12-06 RX ADMIN — ENOXAPARIN SODIUM 40 MG: 40 INJECTION SUBCUTANEOUS at 12:24

## 2019-12-06 RX ADMIN — GUAIFENESIN 600 MG: 600 TABLET ORAL at 08:26

## 2019-12-06 NOTE — SOCIAL WORK
Late Entry from 12/5  Spoke with Radha Jones in infusion center, if Pt is MA pending and referral was sent to [facility] then possible for Pt to go to infusion center

## 2019-12-06 NOTE — ASSESSMENT & PLAN NOTE
· Leukocytosis secondary to septic left shoulder  · Leukocytosis resolved, WBC 5 K today  · Interventions as above

## 2019-12-06 NOTE — SOCIAL WORK
Continue to follow  Spoke with Fauzia at infusion, [facility] will fax  Pt's schedule for infusion center for IV antibiotics  Fauzia informed  that an email was sent out to [address] [place] [address] with [employer] and financial counselor since Pt will be self pay   informed Fauzia that [employer] rep met with Pt for medical assistance application  Received Pt's schedule for infusion center  Met with Pt   gave Pt's schedule for infusion center for SLB and SLQ  Pt aware that she goes to B on Saturdays and Sundays   informed that infusion center contacted [id number] and financial counselor since Pt is self pay  Pt's  to transport Pt home

## 2019-12-06 NOTE — DISCHARGE INSTRUCTIONS
Septic Arthritis   WHAT YOU NEED TO KNOW:   Septic arthritis is a bacterial, viral, or fungal infection in one or more of your joints  The germ can travel to the joint from another part of your body  A puncture wound near the joint can bring the germ directly into the joint  Septic arthritis usually affects large joints, such as in the knee, hip, shoulder, ankle, and elbow  Septic arthritis needs immediate medical care to prevent permanent joint damage  DISCHARGE INSTRUCTIONS:   Call 911 for any of the following:   · Your heartbeat or breathing is fast, or you have trouble breathing  · You have a fever and are confused  Seek care immediately if:   · You are urinating little or not at all  · You have severe abdominal pain  · You have sudden, severe joint pain  Contact your healthcare provider if:   · Your symptoms return  · You have questions or concerns about your condition or care  Medicines:   · Prescription pain medicine  may be given  Do not wait until the pain is severe before you take your medicine  Ask your healthcare provider how to take this medicine safely  · NSAIDs , such as ibuprofen, help decrease swelling, pain, and fever  This medicine is available with or without a doctor's order  NSAIDs can cause stomach bleeding or kidney problems in certain people  If you take blood thinner medicine, always ask your healthcare provider if NSAIDs are safe for you  Always read the medicine label and follow directions  · Take your medicine as directed  Contact your healthcare provider if you think your medicine is not helping or if you have side effects  Tell him or her if you are allergic to any medicine  Keep a list of the medicines, vitamins, and herbs you take  Include the amounts, and when and why you take them  Bring the list or the pill bottles to follow-up visits  Carry your medicine list with you in case of an emergency    Follow up with your healthcare provider as directed:  Write down your questions so you remember to ask them during your visits  Self-care:   · Rest your painful joint as directed  You may need to keep the joint still when it is painful to prevent more damage  · Elevate the joint to reduce swelling and pain  Keep the joint above the level of your heart as often as possible  · Apply ice to the joint to reduce swelling and pain  Ice may also help prevent tissue damage  Use a cold compress, or put crushed ice in a bag  Cover it with a towel and apply it to your joint for 15 to 20 minutes every hour, or as directed  · Exercise as directed  Exercise may help keep your joints flexible and reduce pain  Ask your healthcare provider how much exercise to get each day and which exercises are best for you  © 2017 [address] [employer] Information is for End User's use only and may not be sold, redistributed or otherwise used for commercial purposes  All illustrations and images included in CareNotes® are the copyrighted property of A D [employer] M [employer] Inc  or Fredy Medeiros  The above information is an  only  It is not intended as medical advice for individual conditions or treatments  Talk to your doctor, nurse or pharmacist before following any medical regimen to see if it is safe and effective for you

## 2019-12-06 NOTE — NURSING NOTE
Bedside PICC procedure successful on 2nd attempt  See procedure note in Epic  Confirmation ECG placed in chart  OK to use   Reported to Nurse Dori Lowe

## 2019-12-06 NOTE — PLAN OF CARE
Problem: PAIN - ADULT  Goal: Verbalizes/displays adequate comfort level or baseline comfort level  Description  Interventions:  - Encourage patient to monitor pain and request assistance  - Assess pain using appropriate pain scale  - Administer analgesics based on type and severity of pain and evaluate response  - Implement non-pharmacological measures as appropriate and evaluate response  - Consider cultural and social influences on pain and pain management  - Notify physician/advanced practitioner if interventions unsuccessful or patient reports new pain  12/6/2019 1255 by John Morley RN  Outcome: Adequate for Discharge  12/6/2019 1041 by John Morley RN  Outcome: Progressing     Problem: INFECTION - ADULT  Goal: Absence or prevention of progression during hospitalization  Description  INTERVENTIONS:  - Assess and monitor for signs and symptoms of infection  - Monitor lab/diagnostic results  - Monitor all insertion sites, i e  indwelling lines, tubes, and drains  - Monitor endotracheal if appropriate and nasal secretions for changes in amount and color  - [place] appropriate cooling/warming therapies per order  - Administer medications as ordered  - Instruct and encourage patient and family to use good hand hygiene technique  - Identify and instruct in appropriate isolation precautions for identified infection/condition  12/6/2019 1255 by John Morley RN  Outcome: Adequate for Discharge  12/6/2019 1041 by John Morley RN  Outcome: Progressing  Goal: Absence of fever/infection during neutropenic period  Description  INTERVENTIONS:  - Monitor WBC    12/6/2019 1255 by John Morley RN  Outcome: Adequate for Discharge  12/6/2019 1041 by John Morley RN  Outcome: Progressing     Problem: SAFETY ADULT  Goal: Patient will remain free of falls  Description  INTERVENTIONS:  - Assess patient frequently for physical needs  -  Identify cognitive and physical deficits and behaviors that affect risk of falls    -  [place] fall precautions as indicated by assessment   - Educate patient/family on patient safety including physical limitations  - Instruct patient to call for assistance with activity based on assessment  - Modify environment to reduce risk of injury  - Consider OT/PT consult to assist with strengthening/mobility  12/6/2019 1255 by Odette Conway RN  Outcome: Adequate for Discharge  12/6/2019 1041 by Odette Conway RN  Outcome: Progressing  Goal: Maintain or return to baseline ADL function  Description  INTERVENTIONS:  -  Assess patient's ability to carry out ADLs; assess patient's baseline for ADL function and identify physical deficits which impact ability to perform ADLs (bathing, care of mouth/teeth, toileting, grooming, dressing, etc )  - Assess/evaluate cause of self-care deficits   - Assess range of motion  - Assess patient's mobility; develop plan if impaired  - Assess patient's need for assistive devices and provide as appropriate  - Encourage maximum independence but intervene and supervise when necessary  - Involve family in performance of ADLs  - Assess for home care needs following discharge   - Consider OT consult to assist with ADL evaluation and planning for discharge  - Provide patient education as appropriate  12/6/2019 1255 by Odette Conway RN  Outcome: Adequate for Discharge  12/6/2019 1041 by Odette Conway RN  Outcome: Progressing  Goal: Maintain or return mobility status to optimal level  Description  INTERVENTIONS:  - Assess patient's baseline mobility status (ambulation, transfers, stairs, etc )    - Identify cognitive and physical deficits and behaviors that affect mobility  - Identify mobility aids required to assist with transfers and/or ambulation (gait belt, sit-to-stand, lift, walker, cane, etc )  - [place] fall precautions as indicated by assessment  - Record patient progress and toleration of activity level on Mobility SBAR; progress patient to next Phase/Stage  - Instruct patient to call for assistance with activity based on assessment  - Consider rehabilitation consult to assist with strengthening/weightbearing, etc   12/6/2019 1255 by Stacey Carranza RN  Outcome: Adequate for Discharge  12/6/2019 1041 by Stacey Carranza RN  Outcome: Progressing     Problem: DISCHARGE PLANNING  Goal: Discharge to home or other facility with appropriate resources  Description  INTERVENTIONS:  - Identify barriers to discharge w/patient and caregiver  - Arrange for needed discharge resources and transportation as appropriate  - Identify discharge learning needs (meds, wound care, etc )  - Arrange for interpretive services to assist at discharge as needed  - Refer to Case Management Department for coordinating discharge planning if the patient needs post-hospital services based on physician/advanced practitioner order or complex needs related to functional status, cognitive ability, or social support system  12/6/2019 1255 by Stacey Carranza RN  Outcome: Adequate for Discharge  12/6/2019 1041 by Stacey Carranza RN  Outcome: Progressing     Problem: Knowledge Deficit  Goal: Patient/family/caregiver demonstrates understanding of disease process, treatment plan, medications, and discharge instructions  Description  Complete learning assessment and assess knowledge base    Interventions:  - Provide teaching at level of understanding  - Provide teaching via preferred learning methods  12/6/2019 1255 by Stacey Carranza RN  Outcome: Adequate for Discharge  12/6/2019 1041 by Stacey Carranza RN  Outcome: Progressing     Problem: SKIN/TISSUE INTEGRITY - ADULT  Goal: Incision(s), wounds(s) or drain site(s) healing without S/S of infection  Description  INTERVENTIONS  - Assess and document risk factors for skin impairment   - Assess and document dressing, incision, wound bed, drain sites and surrounding tissue  - Consider nutrition services referral as needed  - Oral mucous membranes remain intact  - Provide patient/ family education  12/6/2019 1255 by Kimberly Xiao RN  Outcome: Adequate for Discharge  12/6/2019 1041 by Kimberly Xiao RN  Outcome: Progressing     Problem: Nutrition/Hydration-ADULT  Goal: Nutrient/Hydration intake appropriate for improving, restoring or maintaining nutritional needs  Description  Monitor and assess patient's nutrition/hydration status for malnutrition  Collaborate with interdisciplinary team and initiate plan and interventions as ordered  Monitor patient's weight and dietary intake as ordered or per policy  Utilize nutrition screening tool and intervene as necessary  Determine patient's food preferences and provide high-protein, high-caloric foods as appropriate       INTERVENTIONS:  - Monitor oral intake, urinary output, labs, and treatment plans  - Assess nutrition and hydration status and recommend course of action  - Evaluate amount of meals eaten  - Assist patient with eating if necessary   - Allow adequate time for meals  - Recommend/ encourage appropriate diets, oral nutritional supplements, and vitamin/mineral supplements  - Order, calculate, and assess calorie counts as needed  - Recommend, monitor, and adjust tube feedings and TPN/PPN based on assessed needs  - Assess need for intravenous fluids  - Provide specific nutrition/hydration education as appropriate  - Include patient/family/caregiver in decisions related to nutrition  12/6/2019 1255 by Kimberly Xiao RN  Outcome: Adequate for Discharge  12/6/2019 1041 by Kimberly Xiao RN  Outcome: Progressing

## 2019-12-06 NOTE — PLAN OF CARE
Problem: PAIN - ADULT  Goal: Verbalizes/displays adequate comfort level or baseline comfort level  Description  Interventions:  - Encourage patient to monitor pain and request assistance  - Assess pain using appropriate pain scale  - Administer analgesics based on type and severity of pain and evaluate response  - Implement non-pharmacological measures as appropriate and evaluate response  - Consider cultural and social influences on pain and pain management  - Notify physician/advanced practitioner if interventions unsuccessful or patient reports new pain  Outcome: Progressing     Problem: INFECTION - ADULT  Goal: Absence or prevention of progression during hospitalization  Description  INTERVENTIONS:  - Assess and monitor for signs and symptoms of infection  - Monitor lab/diagnostic results  - Monitor all insertion sites, i e  indwelling lines, tubes, and drains  - Monitor endotracheal if appropriate and nasal secretions for changes in amount and color  - [place] appropriate cooling/warming therapies per order  - Administer medications as ordered  - Instruct and encourage patient and family to use good hand hygiene technique  - Identify and instruct in appropriate isolation precautions for identified infection/condition  Outcome: Progressing  Goal: Absence of fever/infection during neutropenic period  Description  INTERVENTIONS:  - Monitor WBC    Outcome: Progressing     Problem: SAFETY ADULT  Goal: Patient will remain free of falls  Description  INTERVENTIONS:  - Assess patient frequently for physical needs  -  Identify cognitive and physical deficits and behaviors that affect risk of falls    -  [place] fall precautions as indicated by assessment   - Educate patient/family on patient safety including physical limitations  - Instruct patient to call for assistance with activity based on assessment  - Modify environment to reduce risk of injury  - Consider OT/PT consult to assist with strengthening/mobility  Outcome: Progressing  Goal: Maintain or return to baseline ADL function  Description  INTERVENTIONS:  -  Assess patient's ability to carry out ADLs; assess patient's baseline for ADL function and identify physical deficits which impact ability to perform ADLs (bathing, care of mouth/teeth, toileting, grooming, dressing, etc )  - Assess/evaluate cause of self-care deficits   - Assess range of motion  - Assess patient's mobility; develop plan if impaired  - Assess patient's need for assistive devices and provide as appropriate  - Encourage maximum independence but intervene and supervise when necessary  - Involve family in performance of ADLs  - Assess for home care needs following discharge   - Consider OT consult to assist with ADL evaluation and planning for discharge  - Provide patient education as appropriate  Outcome: Progressing  Goal: Maintain or return mobility status to optimal level  Description  INTERVENTIONS:  - Assess patient's baseline mobility status (ambulation, transfers, stairs, etc )    - Identify cognitive and physical deficits and behaviors that affect mobility  - Identify mobility aids required to assist with transfers and/or ambulation (gait belt, sit-to-stand, lift, walker, cane, etc )  - [place] fall precautions as indicated by assessment  - Record patient progress and toleration of activity level on Mobility SBAR; progress patient to next Phase/Stage  - Instruct patient to call for assistance with activity based on assessment  - Consider rehabilitation consult to assist with strengthening/weightbearing, etc   Outcome: Progressing     Problem: DISCHARGE PLANNING  Goal: Discharge to home or other facility with appropriate resources  Description  INTERVENTIONS:  - Identify barriers to discharge w/patient and caregiver  - Arrange for needed discharge resources and transportation as appropriate  - Identify discharge learning needs (meds, wound care, etc )  - Arrange for interpretive services to assist at discharge as needed  - Refer to Case Management Department for coordinating discharge planning if the patient needs post-hospital services based on physician/advanced practitioner order or complex needs related to functional status, cognitive ability, or social support system  Outcome: Progressing     Problem: Knowledge Deficit  Goal: Patient/family/caregiver demonstrates understanding of disease process, treatment plan, medications, and discharge instructions  Description  Complete learning assessment and assess knowledge base  Interventions:  - Provide teaching at level of understanding  - Provide teaching via preferred learning methods  Outcome: Progressing     Problem: SKIN/TISSUE INTEGRITY - ADULT  Goal: Incision(s), wounds(s) or drain site(s) healing without S/S of infection  Description  INTERVENTIONS  - Assess and document risk factors for skin impairment   - Assess and document dressing, incision, wound bed, drain sites and surrounding tissue  - Consider nutrition services referral as needed  - Oral mucous membranes remain intact  - Provide patient/ family education  Outcome: Progressing     Problem: Nutrition/Hydration-ADULT  Goal: Nutrient/Hydration intake appropriate for improving, restoring or maintaining nutritional needs  Description  Monitor and assess patient's nutrition/hydration status for malnutrition  Collaborate with interdisciplinary team and initiate plan and interventions as ordered  Monitor patient's weight and dietary intake as ordered or per policy  Utilize nutrition screening tool and intervene as necessary  Determine patient's food preferences and provide high-protein, high-caloric foods as appropriate       INTERVENTIONS:  - Monitor oral intake, urinary output, labs, and treatment plans  - Assess nutrition and hydration status and recommend course of action  - Evaluate amount of meals eaten  - Assist patient with eating if necessary   - Allow adequate time for meals  - Recommend/ encourage appropriate diets, oral nutritional supplements, and vitamin/mineral supplements  - Order, calculate, and assess calorie counts as needed  - Recommend, monitor, and adjust tube feedings and TPN/PPN based on assessed needs  - Assess need for intravenous fluids  - Provide specific nutrition/hydration education as appropriate  - Include patient/family/caregiver in decisions related to nutrition  Outcome: Progressing

## 2019-12-06 NOTE — ASSESSMENT & PLAN NOTE
· Septic arthritis of the left shoulder  · Patient presented with left shoulder pain, CT of shoulder revealed subacromial and subdeltoid bursitis     · Joint fluid analysis-  K, 96% neutrophil count   · Procalcitonin 0 08  ·  Status post joint washout by Orthopedics  · Joint cultures and sensitivities negative for any organism  · Continue with IV daptomycin 6 milligram/kilogram IV Q 24 hourly for a total of 2 weeks, last dose 12/20/19  · PICC line placed today  · Patient does not want oxycodone or Dilaudid  · Pain medications switch to tramadol, with better pain control

## 2019-12-06 NOTE — SOCIAL WORK
Continue to follow  Spoke with Fauzia in infusion center, reviewing information to set Pt's schedule for [facility] on Sat and Sun and Mon through Fri at [facility]  Await schedule for infusion center

## 2019-12-06 NOTE — DISCHARGE SUMMARY
Discharge- Killian Whitehead 1960, 62 y o  female MRN: [id number]    Unit/Bed#: [address] [place] [address] Encounter: [id number]    Primary Care Provider: Aroldo Canales MD   Date and time admitted to hospital: 12/2/2019 10:30 PM        * Septic joint of left shoulder region [facility])  Assessment & Plan  · Septic arthritis of the left shoulder  · Patient presented with left shoulder pain, CT of shoulder revealed subacromial and subdeltoid bursitis     · Joint fluid analysis-  K, 96% neutrophil count   · Procalcitonin 0 08  ·  Status post joint washout by Orthopedics  · Joint cultures and sensitivities negative for any organism  · Continue with IV daptomycin 6 milligram/kilogram IV Q 24 hourly for a total of 2 weeks, last dose 12/20/19  · PICC line placed today  · Patient does not want oxycodone or Dilaudid  · Pain medications switch to tramadol, with better pain control    Tobacco use  Assessment & Plan  · Patient states that she smokes about half a pack per day  · Discussed smoking cessation  · Nicotine patch ordered    Abnormal finding on CT scan  Assessment & Plan  · Left shoulder CT imaging revealing subacromial and subdeltoid bursitis with no definitive evidence of a large joint effusion  · Interventions as above    Leukocytosis  Assessment & Plan  · Leukocytosis secondary to septic left shoulder  · Leukocytosis resolved, WBC 5 K today  · Interventions as above        Discharging Physician / Practitioner: Braden Car MD  PCP: Aroldo Canales MD  Admission Date:   Admission Orders (From admission, onward)     Ordered        12/03/19 1509  Inpatient Admission  Once         12/03/19 0350  Place in Observation (expected length of stay for this patient is less than two midnights)  Once                   Discharge Date: 12/06/19    Resolved Problems  Date Reviewed: 12/6/2019    None          Consultations During Hospital Stay:  · Infectious Disease  · Orthopedics    Procedures Performed:   · Left shoulder arthroscopic I&D completed 12/03/2019    Significant Findings / Test Results:   · CT left shoulder    FINDINGS:    JOINT SPACE: No significant evidence of large joint effusion  ALIGNMENT: Anatomic  OSSEOUS STRUCTURES: Unremarkable without fracture or dislocation  ROTATOR CUFF: Limited assessment by CT technique without gross evidence of tear  ACROMIOCLAVICULAR JOINT: Intact  GLENOHUMERAL JOINT: Intact  SOFT TISSUES: There is subacromial and subdeltoid bursitis   Moderate soft tissue swelling is noted   Severe emphysematous changes within the lungs are visualized  Impression:       Subacromial and subdeltoid bursitis   No definite evidence of large joint effusion   If high clinical suspicion for infection MRI can be obtained  Incidental Findings:   · None reported        Reason for Admission:  Left shoulder pain    Hospital Course:     Michael Luis is a 62 y o  female patient with no significant past medical history who originally presented to the hospital on 12/2/2019 due to left shoulder pain  CT scan revealed subacromial and subdeltoid bursitis  The joint was drained in the ED and fluid analysis showed leukocytosis of 135 K with 96% neutrophils dominance and patient was started on IV vancomycin  Patient was taken to the OR by Orthopedics for I and D  Postop course was uneventful, and patient is being discharged on IV daptomycin for 2 weeks per ID recommendations  Please see above list of diagnoses and related plan for additional information  Condition at Discharge: stable     Discharge Day Visit / Exam:     Subjective:  No overnight events    [place] to go home, no complaints  Vitals: Blood Pressure: 157/72 (12/06/19 0759)  Pulse: 57 (12/06/19 0759)  Temperature: 98 4 °F (36 9 °C) (12/06/19 0759)  Temp Source: Tympanic (12/06/19 0759)  Respirations: 18 (12/06/19 0759)  Height: 5' 7" (170 2 cm) (12/03/19 0507)  Weight - Scale: 53 4 kg (117 lb 11 6 oz) (12/03/19 0507)  SpO2: 98 % (12/06/19 6530)  Exam:   Physical Exam   Constitutional: She is oriented to person, place, and time  No distress  HENT:   Head: Normocephalic and atraumatic  Right Ear: External ear normal    Left Ear: External ear normal    Cardiovascular: Normal rate, regular rhythm, normal heart sounds and intact distal pulses  Exam reveals no friction rub  No murmur heard  Pulmonary/Chest: Effort normal and breath sounds normal  No stridor  No respiratory distress  Abdominal: Soft  Bowel sounds are normal  She exhibits no distension  There is no tenderness  Musculoskeletal: Normal range of motion  She exhibits tenderness (left shoulder tenderness)  She exhibits no edema or deformity  Neurological: She is alert and oriented to person, place, and time  Skin: Skin is warm and dry  Capillary refill takes less than 2 seconds  No rash noted  She is not diaphoretic  No erythema  No pallor  Psychiatric: She has a normal mood and affect  Discussion with Family:  Patient would like to update family himself    Discharge instructions/Information to patient and family:   See after visit summary for information provided to patient and family  Provisions for Follow-Up Care:  See after visit summary for information related to follow-up care and any pertinent home health orders  Disposition:     Home    For Discharges to [facility]:   · Not Applicable to this Patient - Not Applicable to this Patient    Planned Readmission:  No     Discharge Statement:  I spent 40 minutes discharging the patient  This time was spent on the day of discharge  I had direct contact with the patient on the day of discharge  Greater than 50% of the total time was spent examining patient, answering all patient questions, arranging and discussing plan of care with patient as well as directly providing post-discharge instructions  Additional time then spent on discharge activities      Discharge Medications:  See after visit summary for reconciled discharge medications provided to patient and family        ** Please Note: This note has been constructed using a voice recognition system **

## 2019-12-06 NOTE — PROCEDURES
Insert PICC line  Date/Time: 12/6/2019 9:20 AM  Performed by: Susanne Mcgowan RN  Authorized by: Lelo Clark MD     Patient location:  Bedside  Other Assisting Provider: No    Consent:     Consent obtained:  Written (By physician)    Consent given by:  Patient    Procedural risks discussed: with physician  Alternatives discussed: with physician  Universal protocol:     Procedure explained and questions answered to patient or proxy's satisfaction: yes      Relevant documents present and verified: yes      Test results available and properly labeled: yes      Radiology Images displayed and confirmed  If images not available, report reviewed: yes      Required blood products, implants, devices, and special equipment available: yes      Site/side marked: yes      Immediately prior to procedure, a time out was called: yes      Patient identity confirmed:  Verbally with patient, hospital-assigned identification number and arm band (with Nurse Xochitl Sethi)  Pre-procedure details:     Hand hygiene: Hand hygiene performed prior to insertion      Sterile barrier technique: All elements of maximal sterile technique followed      Skin preparation:  ChloraPrep    Skin preparation agent: Skin preparation agent completely dried prior to procedure    Indications:     PICC line indications: long term antibiotics    Anesthesia (see MAR for exact dosages): Anesthesia method:  Local infiltration    Local anesthetic:  Lidocaine 1% w/o epi (1 5 ml)  Procedure details:     Location:  Brachial    Vessel type: vein      Laterality:  Right    Site selection rationale:  Basilic attempted but vasospasm developed therefore Brachial accessed      Patient position:  Flat    Procedural supplies:  Double lumen    Catheter size:  5 Fr    Landmarks identified: yes      Ultrasound guidance: yes      Sterile ultrasound techniques: Sterile gel and sterile probe covers were used      Number of attempts:  2    Successful placement: yes Vessel of catheter tip end:  [place] 3CG confirmed    Total catheter length (cm):  43    Catheter out on skin (cm):  0    Max flow rate:  999 ml/hr    Arm circumference:  22 5  Post-procedure details:     Post-procedure:  Dressing applied and securement device placed    Assessment:  Blood return through all ports and free fluid flow    Patient tolerance of procedure: Tolerated well, no immediate complications  Comments:      First attempt in R basilic unsuccessful, vasospasm suspected,Introducer and wire removed and pressure held for 6 minutes with small pressure dressing applied  2nd attempt successful in r brachial, maximum P wave documented and placed in chart  OK to use

## 2019-12-07 ENCOUNTER — HOSPITAL ENCOUNTER (OUTPATIENT)
Dept: INFUSION CENTER | Facility: HOSPITAL | Age: 59
Discharge: HOME | End: 2019-12-07
Payer: COMMERCIAL

## 2019-12-07 VITALS
HEART RATE: 57 BPM | RESPIRATION RATE: 20 BRPM | SYSTOLIC BLOOD PRESSURE: 162 MMHG | DIASTOLIC BLOOD PRESSURE: 78 MMHG | TEMPERATURE: 96.8 F

## 2019-12-07 PROCEDURE — 96365 THER/PROPH/DIAG IV INF INIT: CPT

## 2019-12-07 RX ADMIN — DAPTOMYCIN 325 MG: 500 INJECTION, POWDER, LYOPHILIZED, FOR SOLUTION INTRAVENOUS at 10:30

## 2019-12-07 NOTE — PLAN OF CARE
Problem: Potential for Falls  Goal: Patient will remain free of falls  Description  INTERVENTIONS:  - Assess patient frequently for physical needs  -  Identify cognitive and physical deficits and behaviors that affect risk of falls    -  [place] fall precautions as indicated by assessment   - Educate patient/family on patient safety including physical limitations  - Instruct patient to call for assistance with activity based on assessment  - Modify environment to reduce risk of injury  - Consider OT/PT consult to assist with strengthening/mobility  Outcome: Progressing

## 2019-12-07 NOTE — PROGRESS NOTES
Pt refused AVS but I reviewed with her upcoming appt for 12-8-19 and her appt at [facility] on Monday, 12-9-19  Patient verbalizes understanding

## 2019-12-08 ENCOUNTER — HOSPITAL ENCOUNTER (OUTPATIENT)
Dept: INFUSION CENTER | Facility: HOSPITAL | Age: 59
Discharge: HOME | End: 2019-12-08
Payer: COMMERCIAL

## 2019-12-08 LAB
BACTERIA BLD CULT: NORMAL
BACTERIA BLD CULT: NORMAL

## 2019-12-08 PROCEDURE — 96365 THER/PROPH/DIAG IV INF INIT: CPT

## 2019-12-08 RX ADMIN — DAPTOMYCIN 325 MG: 500 INJECTION, POWDER, LYOPHILIZED, FOR SOLUTION INTRAVENOUS at 10:59

## 2019-12-09 ENCOUNTER — TELEPHONE (OUTPATIENT)
Dept: OBGYN CLINIC | Facility: CLINIC | Age: 59
End: 2019-12-09

## 2019-12-09 ENCOUNTER — HOSPITAL ENCOUNTER (OUTPATIENT)
Dept: INFUSION CENTER | Facility: HOSPITAL | Age: 59
Discharge: HOME | End: 2019-12-09
Payer: COMMERCIAL

## 2019-12-09 VITALS
SYSTOLIC BLOOD PRESSURE: 125 MMHG | OXYGEN SATURATION: 98 % | DIASTOLIC BLOOD PRESSURE: 86 MMHG | HEART RATE: 61 BPM | TEMPERATURE: 99 F | RESPIRATION RATE: 18 BRPM

## 2019-12-09 LAB
ALBUMIN SERPL BCP-MCNC: 3.3 G/DL (ref 3.5–5)
ALP SERPL-CCNC: 93 U/L (ref 46–116)
ALT SERPL W P-5'-P-CCNC: 24 U/L (ref 12–78)
ANION GAP SERPL CALCULATED.3IONS-SCNC: 7 MMOL/L (ref 4–13)
AST SERPL W P-5'-P-CCNC: 15 U/L (ref 5–45)
BASOPHILS # BLD AUTO: 0.05 THOUSANDS/ΜL (ref 0–0.1)
BASOPHILS NFR BLD AUTO: 1 % (ref 0–1)
BILIRUB SERPL-MCNC: 0.3 MG/DL (ref 0.2–1)
BUN SERPL-MCNC: 13 MG/DL (ref 5–25)
CALCIUM SERPL-MCNC: 8.4 MG/DL (ref 8.3–10.1)
CHLORIDE SERPL-SCNC: 104 MMOL/L (ref 100–108)
CK SERPL-CCNC: 35 U/L (ref 26–192)
CO2 SERPL-SCNC: 28 MMOL/L (ref 21–32)
CREAT SERPL-MCNC: 0.76 MG/DL (ref 0.6–1.3)
EOSINOPHIL # BLD AUTO: 0.18 THOUSAND/ΜL (ref 0–0.61)
EOSINOPHIL NFR BLD AUTO: 2 % (ref 0–6)
ERYTHROCYTE [DISTWIDTH] IN BLOOD BY AUTOMATED COUNT: 12.6 % (ref 11.6–15.1)
GFR SERPL CREATININE-BSD FRML MDRD: 85 ML/MIN/1.73SQ M
GLUCOSE SERPL-MCNC: 95 MG/DL (ref 65–140)
HCT VFR BLD AUTO: 39 % (ref 34.8–46.1)
HGB BLD-MCNC: 12.9 G/DL (ref 11.5–15.4)
IMM GRANULOCYTES # BLD AUTO: 0.01 THOUSAND/UL (ref 0–0.2)
IMM GRANULOCYTES NFR BLD AUTO: 0 % (ref 0–2)
LYMPHOCYTES # BLD AUTO: 1.71 THOUSANDS/ΜL (ref 0.6–4.47)
LYMPHOCYTES NFR BLD AUTO: 23 % (ref 14–44)
MCH RBC QN AUTO: 29.8 PG (ref 26.8–34.3)
MCHC RBC AUTO-ENTMCNC: 33.1 G/DL (ref 31.4–37.4)
MCV RBC AUTO: 90 FL (ref 82–98)
MONOCYTES # BLD AUTO: 0.49 THOUSAND/ΜL (ref 0.17–1.22)
MONOCYTES NFR BLD AUTO: 6 % (ref 4–12)
NEUTROPHILS # BLD AUTO: 5.16 THOUSANDS/ΜL (ref 1.85–7.62)
NEUTS SEG NFR BLD AUTO: 68 % (ref 43–75)
PLATELET # BLD AUTO: 261 THOUSANDS/UL (ref 149–390)
PMV BLD AUTO: 9.7 FL (ref 8.9–12.7)
POTASSIUM SERPL-SCNC: 4.1 MMOL/L (ref 3.5–5.3)
PROT SERPL-MCNC: 6.2 G/DL (ref 6.4–8.2)
RBC # BLD AUTO: 4.33 MILLION/UL (ref 3.81–5.12)
SODIUM SERPL-SCNC: 139 MMOL/L (ref 136–145)
WBC # BLD AUTO: 7.6 THOUSAND/UL (ref 4.31–10.16)

## 2019-12-09 PROCEDURE — 85025 COMPLETE CBC W/AUTO DIFF WBC: CPT | Performed by: INTERNAL MEDICINE

## 2019-12-09 PROCEDURE — 96365 THER/PROPH/DIAG IV INF INIT: CPT

## 2019-12-09 PROCEDURE — 82550 ASSAY OF CK (CPK): CPT | Performed by: INTERNAL MEDICINE

## 2019-12-09 PROCEDURE — 80053 COMPREHEN METABOLIC PANEL: CPT | Performed by: INTERNAL MEDICINE

## 2019-12-09 RX ADMIN — DAPTOMYCIN 325 MG: 500 INJECTION, POWDER, LYOPHILIZED, FOR SOLUTION INTRAVENOUS at 12:13

## 2019-12-10 ENCOUNTER — HOSPITAL ENCOUNTER (OUTPATIENT)
Dept: INFUSION CENTER | Facility: HOSPITAL | Age: 59
Discharge: HOME | End: 2019-12-10
Payer: COMMERCIAL

## 2019-12-10 VITALS
TEMPERATURE: 99.2 F | OXYGEN SATURATION: 96 % | HEART RATE: 64 BPM | DIASTOLIC BLOOD PRESSURE: 61 MMHG | SYSTOLIC BLOOD PRESSURE: 110 MMHG | RESPIRATION RATE: 18 BRPM

## 2019-12-10 PROCEDURE — 96365 THER/PROPH/DIAG IV INF INIT: CPT

## 2019-12-10 RX ADMIN — DAPTOMYCIN 325 MG: 500 INJECTION, POWDER, LYOPHILIZED, FOR SOLUTION INTRAVENOUS at 12:27

## 2019-12-10 NOTE — PROGRESS NOTES
Pt here for daily ABX  Both PICC lumens flush well  ABX infused  Lumens flushed again  Disch amb to home, steady gait

## 2019-12-11 ENCOUNTER — HOSPITAL ENCOUNTER (OUTPATIENT)
Dept: INFUSION CENTER | Facility: HOSPITAL | Age: 59
Discharge: HOME | End: 2019-12-11
Payer: COMMERCIAL

## 2019-12-11 VITALS
OXYGEN SATURATION: 98 % | HEART RATE: 61 BPM | RESPIRATION RATE: 18 BRPM | DIASTOLIC BLOOD PRESSURE: 78 MMHG | SYSTOLIC BLOOD PRESSURE: 139 MMHG | TEMPERATURE: 98.8 F

## 2019-12-11 PROCEDURE — 96365 THER/PROPH/DIAG IV INF INIT: CPT

## 2019-12-11 RX ADMIN — DAPTOMYCIN 325 MG: 500 INJECTION, POWDER, LYOPHILIZED, FOR SOLUTION INTRAVENOUS at 10:36

## 2019-12-11 NOTE — PROGRESS NOTES
Pt tolerated Antibiotics today with no adverse reactions  Apt confirmed with pt for tomorrow  Left unit ambulatory with a steady gait

## 2019-12-11 NOTE — PLAN OF CARE
Problem: Knowledge Deficit  Goal: Patient/family/caregiver demonstrates understanding of disease process, treatment plan, medications, and discharge instructions  Description  Complete learning assessment and assess knowledge base  Interventions:  - Provide teaching at level of understanding  - Provide teaching via preferred learning methods  Outcome: Progressing     Problem: Potential for Falls  Goal: Patient will remain free of falls  Description  INTERVENTIONS:  - Assess patient frequently for physical needs  -  Identify cognitive and physical deficits and behaviors that affect risk of falls    -  [place] fall precautions as indicated by assessment   - Educate patient/family on patient safety including physical limitations  - Instruct patient to call for assistance with activity based on assessment  - Modify environment to reduce risk of injury  - Consider OT/PT consult to assist with strengthening/mobility  Outcome: Progressing

## 2019-12-12 ENCOUNTER — HOSPITAL ENCOUNTER (OUTPATIENT)
Dept: INFUSION CENTER | Facility: HOSPITAL | Age: 59
Discharge: HOME | End: 2019-12-12
Payer: COMMERCIAL

## 2019-12-12 VITALS
RESPIRATION RATE: 18 BRPM | DIASTOLIC BLOOD PRESSURE: 57 MMHG | SYSTOLIC BLOOD PRESSURE: 106 MMHG | HEART RATE: 55 BPM | TEMPERATURE: 98.4 F | OXYGEN SATURATION: 97 %

## 2019-12-12 PROCEDURE — 96365 THER/PROPH/DIAG IV INF INIT: CPT

## 2019-12-12 RX ADMIN — DAPTOMYCIN 325 MG: 500 INJECTION, POWDER, LYOPHILIZED, FOR SOLUTION INTRAVENOUS at 10:42

## 2019-12-12 NOTE — PLAN OF CARE
Problem: Potential for Falls  Goal: Patient will remain free of falls  Description  INTERVENTIONS:  - Assess patient frequently for physical needs  -  Identify cognitive and physical deficits and behaviors that affect risk of falls  -  [place] fall precautions as indicated by assessment   - Educate patient/family on patient safety including physical limitations  - Instruct patient to call for assistance with activity based on assessment  - Modify environment to reduce risk of injury  - Consider OT/PT consult to assist with strengthening/mobility  Outcome: Progressing     Problem: Knowledge Deficit  Goal: Patient/family/caregiver demonstrates understanding of disease process, treatment plan, medications, and discharge instructions  Description  Complete learning assessment and assess knowledge base    Interventions:  - Provide teaching at level of understanding  - Provide teaching via preferred learning methods  Outcome: Progressing

## 2019-12-13 ENCOUNTER — OFFICE VISIT (OUTPATIENT)
Dept: OBGYN CLINIC | Facility: CLINIC | Age: 59
End: 2019-12-13

## 2019-12-13 ENCOUNTER — HOSPITAL ENCOUNTER (OUTPATIENT)
Dept: INFUSION CENTER | Facility: HOSPITAL | Age: 59
Discharge: HOME | End: 2019-12-13
Payer: COMMERCIAL

## 2019-12-13 ENCOUNTER — APPOINTMENT (OUTPATIENT)
Dept: RADIOLOGY | Facility: CLINIC | Age: 59
End: 2019-12-13
Payer: COMMERCIAL

## 2019-12-13 VITALS
DIASTOLIC BLOOD PRESSURE: 71 MMHG | TEMPERATURE: 98 F | SYSTOLIC BLOOD PRESSURE: 148 MMHG | HEART RATE: 63 BPM | RESPIRATION RATE: 20 BRPM | OXYGEN SATURATION: 98 %

## 2019-12-13 VITALS
DIASTOLIC BLOOD PRESSURE: 65 MMHG | HEIGHT: 67 IN | SYSTOLIC BLOOD PRESSURE: 138 MMHG | BODY MASS INDEX: 18.36 KG/M2 | WEIGHT: 117 LBS

## 2019-12-13 DIAGNOSIS — M00.9 PYOGENIC ARTHRITIS OF LEFT SHOULDER REGION, DUE TO UNSPECIFIED ORGANISM (HCC): ICD-10-CM

## 2019-12-13 DIAGNOSIS — M00.9 PYOGENIC ARTHRITIS OF LEFT SHOULDER REGION, DUE TO UNSPECIFIED ORGANISM (HCC): Primary | ICD-10-CM

## 2019-12-13 PROCEDURE — 73030 X-RAY EXAM OF SHOULDER: CPT

## 2019-12-13 PROCEDURE — 96365 THER/PROPH/DIAG IV INF INIT: CPT

## 2019-12-13 PROCEDURE — 99024 POSTOP FOLLOW-UP VISIT: CPT | Performed by: ORTHOPAEDIC SURGERY

## 2019-12-13 RX ADMIN — DAPTOMYCIN 325 MG: 500 INJECTION, POWDER, LYOPHILIZED, FOR SOLUTION INTRAVENOUS at 12:28

## 2019-12-13 NOTE — PROGRESS NOTES
Message left with Dr Hylton Patches on call service patient dose not have follow up appointment scheduled and needs return to work papers completed

## 2019-12-13 NOTE — PROGRESS NOTES
Pt tolerated ABX infusion today and right upper arm PICC line dressing and cap change  Old blood noted on dressing at time of dressing removal   Pt tolerated with no adverse reactions   Pt then d/c'd home ambulatory with steady gait

## 2019-12-13 NOTE — LETTER
December 13, 2019     Patient: Jasmina Duran   YOB: 1960   Date of Visit: 12/13/2019       To Whom it May Concern:    Gladys Diaz is under my professional care  She was seen in my office on 12/13/2019  She may return to work with limitations no linfting greater than 10 pounds, no repetitive left shoulder motions  If you have any questions or concerns, please don't hesitate to call           Sincerely,          Ravinder Fatima MD        CC: Jasmina Duran

## 2019-12-13 NOTE — PROGRESS NOTES
PROGRESS NOTE:  10 day Post-operative Visit Left shoulder arthroscopic I&D  Date of Surgery: 12/3/2019    SUBJECTIVE:  Patient returns for follow-up, status post above surgery     The pain is none and mild  At this point in time she reports that the shoulder is feeling much better  She is still having some mild pain with extreme ROM  She is tolerating the abx well, no complaints  Reports that she is scheduled for 1 additional week of abx  She asked about carpal tunnel syndrome and reports that she has numbness in the hands but also numbness in forearms that starts at elbows  OBJECTIVE:  Wounds are appropriate  No erythema, drainage or obvious signs of infection  Neurovascular status is normal    Left shoulder  FF: 180  ABD: 140  No pain with IR/ER  AIN, PIN, radial, ulnar, median intact  SILT median, radial, ulnar (decreased median)  Negative Hubbard's      RADIOGRAPHS:  Left shoulder plain radiographs obtained in clinic today, no obvious fractures or dislocations  ASSESSMENT & PLAN:  - Continue standard postop protocol, outpatient physical therapy  - Reassurance is given that her current symptom complex is normal     - OK to submerge wound for baths/water aerobics if desired  - Initiate PT at this time    - Will refer to pain mgmt to eval neck, she asked about carpal tunnel syndrome  Given numbness in forearms I would be more likely to think this is cervical radiculopathy  Negative hubbard and no obvious motor weakness  Work note provided  Basil Triana MD  Adult Reconstruction  Department of [place] [address]  11:24 AM

## 2019-12-13 NOTE — PLAN OF CARE
Problem: Potential for Falls  Goal: Patient will remain free of falls  Description  INTERVENTIONS:  - Assess patient frequently for physical needs  -  Identify cognitive and physical deficits and behaviors that affect risk of falls  -  [place] fall precautions as indicated by assessment   - Educate patient/family on patient safety including physical limitations  - Instruct patient to call for assistance with activity based on assessment  - Modify environment to reduce risk of injury  - Consider OT/PT consult to assist with strengthening/mobility  Outcome: Progressing     Problem: Knowledge Deficit  Goal: Patient/family/caregiver demonstrates understanding of disease process, treatment plan, medications, and discharge instructions  Description  Complete learning assessment and assess knowledge base    Interventions:  - Provide teaching at level of understanding  - Provide teaching via preferred learning methods  Outcome: Progressing

## 2019-12-14 ENCOUNTER — HOSPITAL ENCOUNTER (OUTPATIENT)
Dept: INFUSION CENTER | Facility: HOSPITAL | Age: 59
Discharge: HOME | End: 2019-12-14
Payer: COMMERCIAL

## 2019-12-14 VITALS
HEART RATE: 64 BPM | DIASTOLIC BLOOD PRESSURE: 66 MMHG | RESPIRATION RATE: 20 BRPM | TEMPERATURE: 97.3 F | SYSTOLIC BLOOD PRESSURE: 130 MMHG

## 2019-12-14 PROCEDURE — 96365 THER/PROPH/DIAG IV INF INIT: CPT

## 2019-12-14 RX ADMIN — DAPTOMYCIN 325 MG: 500 INJECTION, POWDER, LYOPHILIZED, FOR SOLUTION INTRAVENOUS at 11:07

## 2019-12-14 NOTE — PROGRESS NOTES
Pt  Received IV antibiotic today without incident  Instructions given to pt  For abx  Tomorrow in the hospital and verbalized understanding  the patient   Refused AVS

## 2019-12-15 ENCOUNTER — HOSPITAL ENCOUNTER (OUTPATIENT)
Dept: INFUSION CENTER | Facility: HOSPITAL | Age: 59
Discharge: HOME | End: 2019-12-15
Payer: COMMERCIAL

## 2019-12-15 PROCEDURE — 96365 THER/PROPH/DIAG IV INF INIT: CPT

## 2019-12-15 RX ADMIN — DAPTOMYCIN 325 MG: 500 INJECTION, POWDER, LYOPHILIZED, FOR SOLUTION INTRAVENOUS at 10:30

## 2019-12-16 ENCOUNTER — HOSPITAL ENCOUNTER (OUTPATIENT)
Dept: INFUSION CENTER | Facility: HOSPITAL | Age: 59
Discharge: HOME | End: 2019-12-16
Payer: COMMERCIAL

## 2019-12-16 VITALS
HEART RATE: 69 BPM | TEMPERATURE: 98.1 F | SYSTOLIC BLOOD PRESSURE: 113 MMHG | DIASTOLIC BLOOD PRESSURE: 69 MMHG | OXYGEN SATURATION: 96 % | RESPIRATION RATE: 16 BRPM

## 2019-12-16 LAB
ALBUMIN SERPL BCP-MCNC: 3.8 G/DL (ref 3.5–5)
ALP SERPL-CCNC: 102 U/L (ref 46–116)
ALT SERPL W P-5'-P-CCNC: 21 U/L (ref 12–78)
ANION GAP SERPL CALCULATED.3IONS-SCNC: 7 MMOL/L (ref 4–13)
AST SERPL W P-5'-P-CCNC: 17 U/L (ref 5–45)
BASOPHILS # BLD AUTO: 0.06 THOUSANDS/ΜL (ref 0–0.1)
BASOPHILS NFR BLD AUTO: 1 % (ref 0–1)
BILIRUB SERPL-MCNC: 0.4 MG/DL (ref 0.2–1)
BUN SERPL-MCNC: 11 MG/DL (ref 5–25)
CALCIUM SERPL-MCNC: 8.9 MG/DL (ref 8.3–10.1)
CHLORIDE SERPL-SCNC: 100 MMOL/L (ref 100–108)
CK SERPL-CCNC: 30 U/L (ref 26–192)
CO2 SERPL-SCNC: 30 MMOL/L (ref 21–32)
CREAT SERPL-MCNC: 0.78 MG/DL (ref 0.6–1.3)
EOSINOPHIL # BLD AUTO: 0.09 THOUSAND/ΜL (ref 0–0.61)
EOSINOPHIL NFR BLD AUTO: 2 % (ref 0–6)
ERYTHROCYTE [DISTWIDTH] IN BLOOD BY AUTOMATED COUNT: 12 % (ref 11.6–15.1)
GFR SERPL CREATININE-BSD FRML MDRD: 82 ML/MIN/1.73SQ M
GLUCOSE SERPL-MCNC: 116 MG/DL (ref 65–140)
HCT VFR BLD AUTO: 44 % (ref 34.8–46.1)
HGB BLD-MCNC: 14.5 G/DL (ref 11.5–15.4)
IMM GRANULOCYTES # BLD AUTO: 0.01 THOUSAND/UL (ref 0–0.2)
IMM GRANULOCYTES NFR BLD AUTO: 0 % (ref 0–2)
LYMPHOCYTES # BLD AUTO: 1.71 THOUSANDS/ΜL (ref 0.6–4.47)
LYMPHOCYTES NFR BLD AUTO: 29 % (ref 14–44)
MCH RBC QN AUTO: 30 PG (ref 26.8–34.3)
MCHC RBC AUTO-ENTMCNC: 33 G/DL (ref 31.4–37.4)
MCV RBC AUTO: 91 FL (ref 82–98)
MONOCYTES # BLD AUTO: 0.63 THOUSAND/ΜL (ref 0.17–1.22)
MONOCYTES NFR BLD AUTO: 11 % (ref 4–12)
NEUTROPHILS # BLD AUTO: 3.42 THOUSANDS/ΜL (ref 1.85–7.62)
NEUTS SEG NFR BLD AUTO: 57 % (ref 43–75)
NRBC BLD AUTO-RTO: 0 /100 WBCS
PLATELET # BLD AUTO: 221 THOUSANDS/UL (ref 149–390)
PMV BLD AUTO: 10.7 FL (ref 8.9–12.7)
POTASSIUM SERPL-SCNC: 3.8 MMOL/L (ref 3.5–5.3)
PROT SERPL-MCNC: 7.3 G/DL (ref 6.4–8.2)
RBC # BLD AUTO: 4.84 MILLION/UL (ref 3.81–5.12)
SODIUM SERPL-SCNC: 137 MMOL/L (ref 136–145)
WBC # BLD AUTO: 5.92 THOUSAND/UL (ref 4.31–10.16)

## 2019-12-16 PROCEDURE — 96365 THER/PROPH/DIAG IV INF INIT: CPT

## 2019-12-16 PROCEDURE — 82550 ASSAY OF CK (CPK): CPT | Performed by: INTERNAL MEDICINE

## 2019-12-16 PROCEDURE — 85025 COMPLETE CBC W/AUTO DIFF WBC: CPT | Performed by: INTERNAL MEDICINE

## 2019-12-16 PROCEDURE — 80053 COMPREHEN METABOLIC PANEL: CPT | Performed by: INTERNAL MEDICINE

## 2019-12-16 RX ADMIN — DAPTOMYCIN 325 MG: 500 INJECTION, POWDER, LYOPHILIZED, FOR SOLUTION INTRAVENOUS at 13:23

## 2019-12-17 ENCOUNTER — HOSPITAL ENCOUNTER (OUTPATIENT)
Dept: INFUSION CENTER | Facility: HOSPITAL | Age: 59
Discharge: HOME | End: 2019-12-17
Payer: COMMERCIAL

## 2019-12-17 VITALS
HEART RATE: 64 BPM | RESPIRATION RATE: 16 BRPM | SYSTOLIC BLOOD PRESSURE: 118 MMHG | TEMPERATURE: 99.2 F | DIASTOLIC BLOOD PRESSURE: 58 MMHG

## 2019-12-17 PROCEDURE — 96365 THER/PROPH/DIAG IV INF INIT: CPT

## 2019-12-17 RX ADMIN — DAPTOMYCIN 325 MG: 500 INJECTION, POWDER, LYOPHILIZED, FOR SOLUTION INTRAVENOUS at 13:52

## 2019-12-17 NOTE — PROGRESS NOTES
Antibiotic infusion completed with no adverse reaction  Pt refused AVS, left unit ambulatory with steady gait

## 2019-12-17 NOTE — PLAN OF CARE
Problem: Potential for Falls  Goal: Patient will remain free of falls  Description  INTERVENTIONS:  - Assess patient frequently for physical needs  -  Identify cognitive and physical deficits and behaviors that affect risk of falls  -  [place] fall precautions as indicated by assessment   - Educate patient/family on patient safety including physical limitations  - Instruct patient to call for assistance with activity based on assessment  - Modify environment to reduce risk of injury  - Consider OT/PT consult to assist with strengthening/mobility  Outcome: Progressing     Problem: Knowledge Deficit  Goal: Patient/family/caregiver demonstrates understanding of disease process, treatment plan, medications, and discharge instructions  Description  Complete learning assessment and assess knowledge base  Interventions:  - Provide teaching at level of understanding  - Provide teaching via preferred learning methods  Outcome: Progressing     Problem: Potential for Falls  Goal: Patient will remain free of falls  Description  INTERVENTIONS:  - Assess patient frequently for physical needs  -  Identify cognitive and physical deficits and behaviors that affect risk of falls    -  [place] fall precautions as indicated by assessment   - Educate patient/family on patient safety including physical limitations  - Instruct patient to call for assistance with activity based on assessment  - Modify environment to reduce risk of injury  - Consider OT/PT consult to assist with strengthening/mobility  Outcome: Progressing

## 2019-12-18 ENCOUNTER — HOSPITAL ENCOUNTER (OUTPATIENT)
Dept: INFUSION CENTER | Facility: HOSPITAL | Age: 59
Discharge: HOME | End: 2019-12-18
Payer: COMMERCIAL

## 2019-12-18 VITALS
RESPIRATION RATE: 18 BRPM | OXYGEN SATURATION: 97 % | DIASTOLIC BLOOD PRESSURE: 47 MMHG | HEART RATE: 61 BPM | SYSTOLIC BLOOD PRESSURE: 131 MMHG | TEMPERATURE: 98.3 F

## 2019-12-18 PROCEDURE — 96365 THER/PROPH/DIAG IV INF INIT: CPT

## 2019-12-18 RX ADMIN — DAPTOMYCIN 325 MG: 500 INJECTION, POWDER, LYOPHILIZED, FOR SOLUTION INTRAVENOUS at 13:09

## 2019-12-18 NOTE — PROGRESS NOTES
Pt PICC line removed  PICC tip intact 43 cm  Pressure held per protocol and pressure dressing applied  Pt instructed to leave dressing on for 24hrs and not to get it wet  Pt then left unit ambulatory with a steady gait

## 2020-02-21 ENCOUNTER — APPOINTMENT (EMERGENCY)
Dept: RADIOLOGY | Facility: HOSPITAL | Age: 60
DRG: 351 | End: 2020-02-21
Payer: COMMERCIAL

## 2020-02-21 ENCOUNTER — HOSPITAL ENCOUNTER (INPATIENT)
Facility: HOSPITAL | Age: 60
LOS: 1 days | Discharge: HOME | DRG: 351 | End: 2020-02-24
Attending: EMERGENCY MEDICINE | Admitting: FAMILY MEDICINE
Payer: COMMERCIAL

## 2020-02-21 DIAGNOSIS — D72.829 LEUKOCYTOSIS, UNSPECIFIED TYPE: ICD-10-CM

## 2020-02-21 DIAGNOSIS — S63.501A RIGHT WRIST SPRAIN, INITIAL ENCOUNTER: Primary | ICD-10-CM

## 2020-02-21 DIAGNOSIS — M25.531 WRIST PAIN, ACUTE, RIGHT: ICD-10-CM

## 2020-02-21 PROBLEM — R93.89 ABNORMAL FINDING ON CT SCAN: Status: RESOLVED | Noted: 2019-12-03 | Resolved: 2020-02-21

## 2020-02-21 PROBLEM — M00.9 SEPTIC JOINT OF LEFT SHOULDER REGION (HCC): Status: RESOLVED | Noted: 2019-12-03 | Resolved: 2020-02-21

## 2020-02-21 LAB
ANION GAP SERPL CALCULATED.3IONS-SCNC: 9 MMOL/L (ref 4–13)
BASOPHILS # BLD AUTO: 0.06 THOUSANDS/ΜL (ref 0–0.1)
BASOPHILS NFR BLD AUTO: 0 % (ref 0–1)
BUN SERPL-MCNC: 13 MG/DL (ref 5–25)
CALCIUM SERPL-MCNC: 8.6 MG/DL (ref 8.3–10.1)
CHLORIDE SERPL-SCNC: 105 MMOL/L (ref 100–108)
CO2 SERPL-SCNC: 25 MMOL/L (ref 21–32)
CREAT SERPL-MCNC: 0.82 MG/DL (ref 0.6–1.3)
CRP SERPL QL: 1.8 MG/L
EOSINOPHIL # BLD AUTO: 0.07 THOUSAND/ΜL (ref 0–0.61)
EOSINOPHIL NFR BLD AUTO: 1 % (ref 0–6)
ERYTHROCYTE [DISTWIDTH] IN BLOOD BY AUTOMATED COUNT: 12 % (ref 11.6–15.1)
GFR SERPL CREATININE-BSD FRML MDRD: 77 ML/MIN/1.73SQ M
GLUCOSE SERPL-MCNC: 115 MG/DL (ref 65–140)
HCT VFR BLD AUTO: 40 % (ref 34.8–46.1)
HGB BLD-MCNC: 13.6 G/DL (ref 11.5–15.4)
IMM GRANULOCYTES # BLD AUTO: 0.05 THOUSAND/UL (ref 0–0.2)
IMM GRANULOCYTES NFR BLD AUTO: 0 % (ref 0–2)
LYMPHOCYTES # BLD AUTO: 1.84 THOUSANDS/ΜL (ref 0.6–4.47)
LYMPHOCYTES NFR BLD AUTO: 12 % (ref 14–44)
MCH RBC QN AUTO: 30.4 PG (ref 26.8–34.3)
MCHC RBC AUTO-ENTMCNC: 34 G/DL (ref 31.4–37.4)
MCV RBC AUTO: 90 FL (ref 82–98)
MONOCYTES # BLD AUTO: 1.02 THOUSAND/ΜL (ref 0.17–1.22)
MONOCYTES NFR BLD AUTO: 7 % (ref 4–12)
NEUTROPHILS # BLD AUTO: 12.25 THOUSANDS/ΜL (ref 1.85–7.62)
NEUTS SEG NFR BLD AUTO: 80 % (ref 43–75)
NRBC BLD AUTO-RTO: 0 /100 WBCS
PLATELET # BLD AUTO: 266 THOUSANDS/UL (ref 149–390)
PMV BLD AUTO: 10.3 FL (ref 8.9–12.7)
POTASSIUM SERPL-SCNC: 3.9 MMOL/L (ref 3.5–5.3)
RBC # BLD AUTO: 4.47 MILLION/UL (ref 3.81–5.12)
SODIUM SERPL-SCNC: 139 MMOL/L (ref 136–145)
WBC # BLD AUTO: 15.29 THOUSAND/UL (ref 4.31–10.16)

## 2020-02-21 PROCEDURE — 99284 EMERGENCY DEPT VISIT MOD MDM: CPT | Performed by: EMERGENCY MEDICINE

## 2020-02-21 PROCEDURE — 77002 NEEDLE LOCALIZATION BY XRAY: CPT

## 2020-02-21 PROCEDURE — 20605 DRAIN/INJ JOINT/BURSA W/O US: CPT | Performed by: EMERGENCY MEDICINE

## 2020-02-21 PROCEDURE — 73110 X-RAY EXAM OF WRIST: CPT

## 2020-02-21 PROCEDURE — 86140 C-REACTIVE PROTEIN: CPT | Performed by: EMERGENCY MEDICINE

## 2020-02-21 PROCEDURE — 0RJN3ZZ INSPECTION OF RIGHT WRIST JOINT, PERCUTANEOUS APPROACH: ICD-10-PCS | Performed by: EMERGENCY MEDICINE

## 2020-02-21 PROCEDURE — 85025 COMPLETE CBC W/AUTO DIFF WBC: CPT | Performed by: EMERGENCY MEDICINE

## 2020-02-21 PROCEDURE — 99220 PR INITIAL OBSERVATION CARE/DAY 70 MINUTES: CPT | Performed by: NURSE PRACTITIONER

## 2020-02-21 PROCEDURE — 80048 BASIC METABOLIC PNL TOTAL CA: CPT | Performed by: EMERGENCY MEDICINE

## 2020-02-21 PROCEDURE — 96374 THER/PROPH/DIAG INJ IV PUSH: CPT

## 2020-02-21 PROCEDURE — 96375 TX/PRO/DX INJ NEW DRUG ADDON: CPT

## 2020-02-21 PROCEDURE — 99284 EMERGENCY DEPT VISIT MOD MDM: CPT

## 2020-02-21 PROCEDURE — 36415 COLL VENOUS BLD VENIPUNCTURE: CPT | Performed by: EMERGENCY MEDICINE

## 2020-02-21 RX ORDER — NICOTINE 21 MG/24HR
1 PATCH, TRANSDERMAL 24 HOURS TRANSDERMAL DAILY
Status: DISCONTINUED | OUTPATIENT
Start: 2020-02-22 | End: 2020-02-24 | Stop reason: HOSPADM

## 2020-02-21 RX ORDER — HYDROMORPHONE HCL/PF 1 MG/ML
1 SYRINGE (ML) INJECTION
Status: DISCONTINUED | OUTPATIENT
Start: 2020-02-21 | End: 2020-02-22

## 2020-02-21 RX ORDER — ACETAMINOPHEN 325 MG/1
650 TABLET ORAL EVERY 6 HOURS PRN
Status: DISCONTINUED | OUTPATIENT
Start: 2020-02-21 | End: 2020-02-22

## 2020-02-21 RX ORDER — FENTANYL CITRATE 50 UG/ML
25 INJECTION, SOLUTION INTRAMUSCULAR; INTRAVENOUS ONCE
Status: COMPLETED | OUTPATIENT
Start: 2020-02-21 | End: 2020-02-21

## 2020-02-21 RX ORDER — LIDOCAINE HYDROCHLORIDE 10 MG/ML
10 INJECTION, SOLUTION EPIDURAL; INFILTRATION; INTRACAUDAL; PERINEURAL ONCE
Status: COMPLETED | OUTPATIENT
Start: 2020-02-21 | End: 2020-02-21

## 2020-02-21 RX ORDER — ONDANSETRON 2 MG/ML
4 INJECTION INTRAMUSCULAR; INTRAVENOUS EVERY 6 HOURS PRN
Status: DISCONTINUED | OUTPATIENT
Start: 2020-02-21 | End: 2020-02-24 | Stop reason: HOSPADM

## 2020-02-21 RX ORDER — HYDROMORPHONE HCL/PF 1 MG/ML
0.5 SYRINGE (ML) INJECTION ONCE
Status: COMPLETED | OUTPATIENT
Start: 2020-02-21 | End: 2020-02-21

## 2020-02-21 RX ORDER — MORPHINE SULFATE 4 MG/ML
4 INJECTION, SOLUTION INTRAMUSCULAR; INTRAVENOUS ONCE
Status: COMPLETED | OUTPATIENT
Start: 2020-02-21 | End: 2020-02-21

## 2020-02-21 RX ADMIN — HYDROMORPHONE HYDROCHLORIDE 1 MG: 1 INJECTION, SOLUTION INTRAMUSCULAR; INTRAVENOUS; SUBCUTANEOUS at 22:51

## 2020-02-21 RX ADMIN — FENTANYL CITRATE 25 MCG: 50 INJECTION, SOLUTION INTRAMUSCULAR; INTRAVENOUS at 20:21

## 2020-02-21 RX ADMIN — MORPHINE SULFATE 4 MG: 4 INJECTION INTRAVENOUS at 19:42

## 2020-02-21 RX ADMIN — LIDOCAINE HYDROCHLORIDE 10 ML: 10 INJECTION, SOLUTION EPIDURAL; INFILTRATION; INTRACAUDAL; PERINEURAL at 20:30

## 2020-02-21 RX ADMIN — HYDROMORPHONE HYDROCHLORIDE 0.5 MG: 1 INJECTION, SOLUTION INTRAMUSCULAR; INTRAVENOUS; SUBCUTANEOUS at 21:05

## 2020-02-21 NOTE — LETTER
[facility] INTENSIVE CARE UNIT  [address]  [facility] [address]  Dept: [phone]    March 1, 2020     Patient: Erica Mcgrath   YOB: 1960   Date of Visit: 2/21/2020       To Whom it May Concern:    Lety Kim is under my professional care  She was seen in the hospital from 2/21/2020   to 02/24/20  She may return to work on 3/2/2020 without limitations  If you have any questions or concerns, please don't hesitate to call           Sincerely,          Oscar Hernandez MD

## 2020-02-22 LAB
ANION GAP SERPL CALCULATED.3IONS-SCNC: 7 MMOL/L (ref 4–13)
BASOPHILS # BLD AUTO: 0.05 THOUSANDS/ΜL (ref 0–0.1)
BASOPHILS NFR BLD AUTO: 0 % (ref 0–1)
BUN SERPL-MCNC: 11 MG/DL (ref 5–25)
CALCIUM SERPL-MCNC: 8.6 MG/DL (ref 8.3–10.1)
CHLORIDE SERPL-SCNC: 104 MMOL/L (ref 100–108)
CO2 SERPL-SCNC: 27 MMOL/L (ref 21–32)
CREAT SERPL-MCNC: 0.66 MG/DL (ref 0.6–1.3)
EOSINOPHIL # BLD AUTO: 0.12 THOUSAND/ΜL (ref 0–0.61)
EOSINOPHIL NFR BLD AUTO: 1 % (ref 0–6)
ERYTHROCYTE [DISTWIDTH] IN BLOOD BY AUTOMATED COUNT: 12 % (ref 11.6–15.1)
GFR SERPL CREATININE-BSD FRML MDRD: 96 ML/MIN/1.73SQ M
GLUCOSE P FAST SERPL-MCNC: 106 MG/DL (ref 65–99)
GLUCOSE SERPL-MCNC: 106 MG/DL (ref 65–140)
HCT VFR BLD AUTO: 40.7 % (ref 34.8–46.1)
HGB BLD-MCNC: 13.6 G/DL (ref 11.5–15.4)
IMM GRANULOCYTES # BLD AUTO: 0.04 THOUSAND/UL (ref 0–0.2)
IMM GRANULOCYTES NFR BLD AUTO: 0 % (ref 0–2)
LYMPHOCYTES # BLD AUTO: 1.67 THOUSANDS/ΜL (ref 0.6–4.47)
LYMPHOCYTES NFR BLD AUTO: 14 % (ref 14–44)
MAGNESIUM SERPL-MCNC: 1.9 MG/DL (ref 1.6–2.6)
MCH RBC QN AUTO: 30.2 PG (ref 26.8–34.3)
MCHC RBC AUTO-ENTMCNC: 33.4 G/DL (ref 31.4–37.4)
MCV RBC AUTO: 90 FL (ref 82–98)
MONOCYTES # BLD AUTO: 0.94 THOUSAND/ΜL (ref 0.17–1.22)
MONOCYTES NFR BLD AUTO: 8 % (ref 4–12)
NEUTROPHILS # BLD AUTO: 8.95 THOUSANDS/ΜL (ref 1.85–7.62)
NEUTS SEG NFR BLD AUTO: 77 % (ref 43–75)
NRBC BLD AUTO-RTO: 0 /100 WBCS
PLATELET # BLD AUTO: 215 THOUSANDS/UL (ref 149–390)
PMV BLD AUTO: 10.1 FL (ref 8.9–12.7)
POTASSIUM SERPL-SCNC: 4.1 MMOL/L (ref 3.5–5.3)
RBC # BLD AUTO: 4.51 MILLION/UL (ref 3.81–5.12)
SODIUM SERPL-SCNC: 138 MMOL/L (ref 136–145)
WBC # BLD AUTO: 11.77 THOUSAND/UL (ref 4.31–10.16)

## 2020-02-22 PROCEDURE — 83735 ASSAY OF MAGNESIUM: CPT | Performed by: NURSE PRACTITIONER

## 2020-02-22 PROCEDURE — 80048 BASIC METABOLIC PNL TOTAL CA: CPT | Performed by: NURSE PRACTITIONER

## 2020-02-22 PROCEDURE — 99225 PR SBSQ OBSERVATION CARE/DAY 25 MINUTES: CPT | Performed by: NURSE PRACTITIONER

## 2020-02-22 PROCEDURE — 85025 COMPLETE CBC W/AUTO DIFF WBC: CPT | Performed by: NURSE PRACTITIONER

## 2020-02-22 PROCEDURE — 99244 OFF/OP CNSLTJ NEW/EST MOD 40: CPT | Performed by: ORTHOPAEDIC SURGERY

## 2020-02-22 RX ORDER — OXYCODONE HYDROCHLORIDE 5 MG/1
10 TABLET ORAL EVERY 4 HOURS PRN
Status: DISCONTINUED | OUTPATIENT
Start: 2020-02-22 | End: 2020-02-22

## 2020-02-22 RX ORDER — HYDROMORPHONE HCL/PF 1 MG/ML
1 SYRINGE (ML) INJECTION
Status: DISCONTINUED | OUTPATIENT
Start: 2020-02-22 | End: 2020-02-22

## 2020-02-22 RX ORDER — ACETAMINOPHEN 325 MG/1
650 TABLET ORAL EVERY 6 HOURS SCHEDULED
Status: DISCONTINUED | OUTPATIENT
Start: 2020-02-22 | End: 2020-02-22

## 2020-02-22 RX ORDER — OXYCODONE HYDROCHLORIDE 5 MG/1
5 TABLET ORAL EVERY 4 HOURS PRN
Status: DISCONTINUED | OUTPATIENT
Start: 2020-02-22 | End: 2020-02-24

## 2020-02-22 RX ORDER — ACETAMINOPHEN 325 MG/1
650 TABLET ORAL EVERY 6 HOURS SCHEDULED
Status: DISCONTINUED | OUTPATIENT
Start: 2020-02-22 | End: 2020-02-24 | Stop reason: HOSPADM

## 2020-02-22 RX ORDER — OXYCODONE HYDROCHLORIDE 5 MG/1
2.5 TABLET ORAL EVERY 4 HOURS PRN
Status: DISCONTINUED | OUTPATIENT
Start: 2020-02-22 | End: 2020-02-24

## 2020-02-22 RX ORDER — OXYCODONE HYDROCHLORIDE 5 MG/1
5 TABLET ORAL EVERY 4 HOURS PRN
Status: DISCONTINUED | OUTPATIENT
Start: 2020-02-22 | End: 2020-02-22

## 2020-02-22 RX ADMIN — ACETAMINOPHEN 650 MG: 325 TABLET ORAL at 15:27

## 2020-02-22 RX ADMIN — VANCOMYCIN HYDROCHLORIDE 750 MG: 750 INJECTION, SOLUTION INTRAVENOUS at 03:06

## 2020-02-22 RX ADMIN — NICOTINE 1 PATCH: 14 PATCH TRANSDERMAL at 10:49

## 2020-02-22 RX ADMIN — OXYCODONE HYDROCHLORIDE 10 MG: 5 TABLET ORAL at 00:46

## 2020-02-22 RX ADMIN — VANCOMYCIN HYDROCHLORIDE 750 MG: 750 INJECTION, SOLUTION INTRAVENOUS at 14:17

## 2020-02-22 RX ADMIN — DOXYCYCLINE 100 MG: 100 INJECTION, POWDER, LYOPHILIZED, FOR SOLUTION INTRAVENOUS at 21:53

## 2020-02-22 RX ADMIN — ACETAMINOPHEN 650 MG: 325 TABLET ORAL at 21:52

## 2020-02-22 RX ADMIN — OXYCODONE HYDROCHLORIDE 5 MG: 5 TABLET ORAL at 03:09

## 2020-02-22 RX ADMIN — ONDANSETRON 4 MG: 2 INJECTION INTRAMUSCULAR; INTRAVENOUS at 06:19

## 2020-02-22 RX ADMIN — OXYCODONE HYDROCHLORIDE 5 MG: 5 TABLET ORAL at 13:46

## 2020-02-22 RX ADMIN — ONDANSETRON 4 MG: 2 INJECTION INTRAMUSCULAR; INTRAVENOUS at 18:11

## 2020-02-22 RX ADMIN — HYDROMORPHONE HYDROCHLORIDE 1 MG: 1 INJECTION, SOLUTION INTRAMUSCULAR; INTRAVENOUS; SUBCUTANEOUS at 06:21

## 2020-02-22 NOTE — ASSESSMENT & PLAN NOTE
Pt smokes approx 1/2 pack cigarettes per day     · Continue nicoderm patch  · Tobacco cessation education

## 2020-02-22 NOTE — PROGRESS NOTES
Progress Note - Laure Lombard 1960, 62 y o  female MRN: [id number]    Unit/Bed#: -01 Encounter: [id number]    Primary Care Provider: Urvashi Keenan MD   Date and time admitted to hospital: 2/21/2020  6:49 PM    * Wrist pain, acute, right  Assessment & Plan  Pt developed acute R wrist pain last night  Pain was gone when she woke up this am but returned as the day went on  Pain is worst around her wrist but radiates to tips of fingers  R thumb appears inflamed and warm to touch  Decreased ROM in all fingers  · Small collection of fluid noted on US by ED provider  Aspiration attempted but was unsuccessful  · Received Fentanyl, Dilaudid, and Morphine in ED without relief   · Will adjust pain regimen for mild, moderate and severe as well as breakthrough   · Continue IV Vancomycin - patient improving   · Appreciate Orthopedic surgery consultation & recommendations   · Lyme disease titer negative 12/5/19    Leukocytosis  Assessment & Plan  WBC count 15 29 initially is now trending down 11 77  Recent history of septic joint L shoulder  Completed 2 week course of IV antibiotic  Source unknown  Now presenting with c/o R wrist and hand pain  · Continue IV Vancomycin   · Monitor WBC and fever curve    Tobacco use  Assessment & Plan  Pt smokes approx 1/2 pack cigarettes per day  · Continue nicoderm patch  · Tobacco cessation education      VTE Pharmacologic Prophylaxis:   Pharmacologic: Enoxaparin (Lovenox)  Mechanical VTE Prophylaxis in Place: Yes    Patient Centered Rounds: I have performed bedside rounds with nursing staff today  Discussions with Specialists or Other Care Team Provider: Ortho/ IM attending     Education and Discussions with Family / Patient: Yes     Time Spent for Care: 15 minutes  More than 50% of total time spent on counseling and coordination of care as described above  Current Length of Stay: 0 day(s)    Current Patient Status: Observation   Certification Statement:  The patient will continue to require additional inpatient hospital stay due to IV abx, tx for possible infected joint     Discharge Plan: D/c once pain & infection resolve    Code Status: Level 1 - Full Code      Subjective:   "I'm okay, I just need another blanket"    Objective:     Vitals:   Temp (24hrs), Av 7 °F (36 5 °C), Min:97 4 °F (36 3 °C), Max:98 °F (36 7 °C)    Temp:  [97 4 °F (36 3 °C)-98 °F (36 7 °C)] 97 4 °F (36 3 °C)  HR:  [52-65] 65  Resp:  [16-22] 18  BP: (134-169)/(62-98) 134/62  SpO2:  [94 %-98 %] 94 %  Body mass index is 18 34 kg/m²  Input and Output Summary (last 24 hours): Intake/Output Summary (Last 24 hours) at 2020 1200  Last data filed at 2020 0601  Gross per 24 hour   Intake 150 ml   Output    Net 150 ml       Physical Exam:     Physical Exam   Constitutional: She is oriented to person, place, and time  No distress  HENT:   Head: Normocephalic and atraumatic  Eyes: Pupils are equal, round, and reactive to light  Neck: Normal range of motion  No tracheal deviation present  Cardiovascular: Normal rate and regular rhythm  Pulmonary/Chest: Effort normal and breath sounds normal    Abdominal: Soft  Bowel sounds are normal  There is no tenderness  Musculoskeletal:   Right wrist limited ROM    Lymphadenopathy:     She has no cervical adenopathy  Neurological: She is alert and oriented to person, place, and time  Skin: Skin is warm and dry  She is not diaphoretic  Nails show no clubbing  Psychiatric: She has a normal mood and affect   Cognition and memory are normal        Additional Data:     Labs:    Results from last 7 days   Lab Units 20  0625   WBC Thousand/uL 11 77*   HEMOGLOBIN g/dL 13 6   HEMATOCRIT % 40 7   PLATELETS Thousands/uL 215   NEUTROS PCT % 77*   LYMPHS PCT % 14   MONOS PCT % 8   EOS PCT % 1     Results from last 7 days   Lab Units 20  0625   SODIUM mmol/L 138   POTASSIUM mmol/L 4 1   CHLORIDE mmol/L 104   CO2 mmol/L 27   BUN mg/dL 11   CREATININE mg/dL 0 66   ANION GAP mmol/L 7   CALCIUM mg/dL 8 6   GLUCOSE RANDOM mg/dL 106                           * I Have Reviewed All Lab Data Listed Above  * Additional Pertinent Lab Tests Reviewed: [address] Admission Reviewed    Imaging:    Imaging Reports Reviewed Today Include: Wrist x-ray       Recent Cultures (last 7 days):           Last 24 Hours Medication List:     Current Facility-Administered Medications:  acetaminophen 650 mg Oral Q6H PRN SADIQ Andersen    [START ON 2/23/2020] enoxaparin 40 mg Subcutaneous Q24H [facility] SADIQ Araiza    HYDROmorphone 1 mg Intravenous Q3H PRN Jud Khan, SADIQ    nicotine 1 patch Transdermal Daily SADIQ Lobo    ondansetron 4 mg Intravenous Q6H PRN SADIQ Andersen    oxyCODONE 10 mg Oral Q4H PRN Jud Khan, SADIQ    oxyCODONE 5 mg Oral Q4H PRN SADIQ Lobo    vancomycin 15 mg/kg Intravenous Q12H SADIQ Andersen Last Rate: Stopped (02/22/20 0601)        Today, Patient Was Seen By: SADIQ Sesay    ** Please Note: Dictation voice to text software may have been used in the creation of this document   **

## 2020-02-22 NOTE — ED PROVIDER NOTES
History  Chief Complaint   Patient presents with    Wrist Injury     Patient states she has had right thub and wrist pain since yesterday, hurts to move, denies fall or injury        History provided by:  Patient   used: No        Patient is a 56-year-old female presenting to emergency department with right wrist pain  States still had episode last night  Seemed to better no morning, worse again during the day  Patient severe pain while in the ER  No nausea or vomiting  No fevers or chills  Patient recent septic shoulder infection, unknown source, finished 2 weeks of IV antibiotics in 2 weeks of p o  Antibiotics  MDM check CBC, BMP, CRP, x-ray, re-evaluate    Patient with continued pain after multiple narcotic pain medications  Very small fluid collection noted on ultrasound  Will try to aspirate  Prior to Admission Medications   Prescriptions Last Dose Informant Patient Reported? Taking?   acetaminophen (TYLENOL) 325 mg tablet   No No   Sig: Take 2 tablets (650 mg total) by mouth every 6 (six) hours   nicotine (NICODERM CQ) 14 mg/24hr TD 24 hr patch   No No   Sig: Place 1 patch on the skin daily      Facility-Administered Medications: None       Past Medical History:   Diagnosis Date    No known health problems     Septic arthritis ([address] )        Past Surgical History:   Procedure Laterality Date    HYSTERECTOMY      SHOULDER ARTHROSCOPY Left 12/3/2019    Procedure: ARTHROSCOPY SHOULDER;  Surgeon: Timothy Verdugo MD;  Location: [facility] OR;  Service: Orthopedics       History reviewed  No pertinent family history  I have reviewed and agree with the history as documented      Social History     Tobacco Use    Smoking status: Current Every Day Smoker     Packs/day: 0 50    Smokeless tobacco: Never Used    Tobacco comment: refused   Substance Use Topics    Alcohol use: Not Currently    Drug use: No       Review of Systems   Constitutional: Negative for chills, diaphoresis and fever    HENT: Negative for congestion  Respiratory: Negative for cough, shortness of breath, wheezing and stridor  Cardiovascular: Negative for chest pain, palpitations and leg swelling  Gastrointestinal: Negative for abdominal pain, blood in stool, diarrhea, nausea and vomiting  Genitourinary: Negative for dysuria, frequency and urgency  Musculoskeletal: Negative for neck pain and neck stiffness  Skin: Negative for pallor and rash  Neurological: Negative for dizziness, syncope, weakness, light-headedness and headaches  All other systems reviewed and are negative  Physical Exam  Physical Exam   Constitutional: She is oriented to person, place, and time  She appears well-developed and well-nourished  HENT:   Head: Normocephalic and atraumatic  Eyes: Pupils are equal, round, and reactive to light  Neck: Neck supple  Cardiovascular: Normal rate, regular rhythm, normal heart sounds and intact distal pulses  Pulmonary/Chest: Effort normal and breath sounds normal  No respiratory distress  Musculoskeletal: She exhibits no edema  Decreased range of motion of the right wrist due to pain  No overlying cellulitis  Mild warmth on the palmar aspect  Neurovascular intact distally  Neurological: She is alert and oriented to person, place, and time  Skin: Skin is warm and dry  Capillary refill takes less than 2 seconds  No rash noted  No erythema  No pallor  Vitals reviewed        Vital Signs  ED Triage Vitals [02/21/20 1851]   Temperature Pulse Respirations Blood Pressure SpO2   97 9 °F (36 6 °C) 58 16 160/98 98 %      Temp Source Heart Rate Source Patient Position - Orthostatic VS BP Location FiO2 (%)   Temporal Monitor Sitting Left arm --      Pain Score       8           Vitals:    02/21/20 1900 02/21/20 2015 02/21/20 2030 02/21/20 2100   BP: 139/67 152/69 153/70 169/79   Pulse: (!) 52 (!) 52 55 60   Patient Position - Orthostatic VS:             Visual Acuity      ED Medications  Medications   morphine (PF) 4 mg/mL injection 4 mg (4 mg Intravenous Given 2/21/20 1942)   fentanyl citrate (PF) 100 MCG/2ML 25 mcg (25 mcg Intravenous Given 2/21/20 2021)   lidocaine (PF) (XYLOCAINE-MPF) 1 % injection 10 mL (10 mL Infiltration Given 2/21/20 2030)   HYDROmorphone (DILAUDID) injection 0 5 mg (0 5 mg Intravenous Given 2/21/20 2105)       Diagnostic Studies  Results Reviewed     Procedure Component Value Units Date/Time    Basic metabolic panel [[id number]] Collected:  02/21/20 1942    Lab Status:  Final result Specimen:  Blood from Arm, Right Updated:  02/21/20 2007     Sodium 139 mmol/L      Potassium 3 9 mmol/L      Chloride 105 mmol/L      CO2 25 mmol/L      ANION GAP 9 mmol/L      BUN 13 mg/dL      Creatinine 0 82 mg/dL      Glucose 115 mg/dL      Calcium 8 6 mg/dL      eGFR 77 ml/min/1 73sq m     Narrative:       [place] guidelines for Chronic Kidney Disease (CKD):     Stage 1 with normal or high GFR (GFR > 90 mL/min/1 73 square meters)    Stage 2 Mild CKD (GFR = 60-89 mL/min/1 73 square meters)    Stage 3A Moderate CKD (GFR = 45-59 mL/min/1 73 square meters)    Stage 3B Moderate CKD (GFR = 30-44 mL/min/1 73 square meters)    Stage 4 Severe CKD (GFR = 15-29 mL/min/1 73 square meters)    Stage 5 End Stage CKD (GFR <15 mL/min/1 73 square meters)  Note: GFR calculation is accurate only with a steady state creatinine    C-reactive protein [[id number]]  (Normal) Collected:  02/21/20 1942    Lab Status:  Final result Specimen:  Blood from Arm, Right Updated:  02/21/20 2007     CRP 1 8 mg/L     CBC and differential [[id number]]  (Abnormal) Collected:  02/21/20 1942    Lab Status:  Final result Specimen:  Blood from Arm, Right Updated:  02/21/20 1953     WBC 15 29 Thousand/uL      RBC 4 47 Million/uL      Hemoglobin 13 6 g/dL      Hematocrit 40 0 %      MCV 90 fL      MCH 30 4 pg      MCHC 34 0 g/dL      RDW 12 0 %      MPV 10 3 fL      Platelets 156 Thousands/uL      nRBC 0 /100 WBCs      Neutrophils Relative 80 %      Immat GRANS % 0 %      Lymphocytes Relative 12 %      Monocytes Relative 7 %      Eosinophils Relative 1 %      Basophils Relative 0 %      Neutrophils Absolute 12 25 Thousands/µL      Immature Grans Absolute 0 05 Thousand/uL      Lymphocytes Absolute 1 84 Thousands/µL      Monocytes Absolute 1 02 Thousand/µL      Eosinophils Absolute 0 07 Thousand/µL      Basophils Absolute 0 06 Thousands/µL                  XR wrist 3+ views RIGHT    (Results Pending)              Procedures  Arthrocentesis  Date/Time: 2/21/2020 9:00 PM  Performed by: Magalys Cormier MD  Authorized by: Magalys Cormier MD     Location:  Bedside  Verbal consent obtained?: Yes    Written consent obtained?: Yes    Consent given by:  Patient  Patient states understanding of procedure being performed: Yes    Patient's understanding of procedure matches consent: Yes    Procedure consent matches procedure scheduled: Yes    Relevant documents present and verified: Yes    Patient identity confirmed:  Verbally with patient  Time out: Immediately prior to the procedure a time out was called    Indications:  Pain  Body area:   Wrist  Joint:  Right wrist  Joint:  Right wrist  Joint:  Right wrist  Joint:  Right wrist  Joint:  Right wrist  Joint:  Right wrist  Joint:  Right wrist  Joint:  Right wrist  Joint:  Right wrist  Joint:  Right wrist  Joint:  Right wrist  Joint:  Right wrist  Joint:  Right wrist  Joint:  Right wrist  Joint:  Right wrist  Joint:  Right wrist  Joint:  Right wrist  Joint:  Right wrist  Joint:  Right wrist  Joint:  Right wrist  Joint:  Right wrist  Joint:  Right wrist  Joint:  Right wrist  Joint:  Right wrist  Joint:  Right wrist  Joint:  Right wrist  Joint:  Right wrist  Local anesthesia used?: Yes    Local anesthetic:  Lidocaine 1% with epinephrine  Anesthetic total (ml):  3  Needle size:  22 G  Ultrasound guidance: No    Approach: Radial   Patient tolerance:  Patient tolerated the procedure well with no immediate complications   Unable to get fluid out             ED Course                               MDM      Disposition  Final diagnoses:   Right wrist sprain, initial encounter     Time reflects when diagnosis was documented in both MDM as applicable and the Disposition within this note     Time User Action Codes Description Comment    2/21/2020  9:08 PM Yazmin Horowitz Add [S63 501A] Right wrist sprain, initial encounter       ED Disposition     ED Disposition Condition Date/Time Comment    Admit Stable Fri Feb 21, 2020  9:08 PM Case was discussed with medicine and the patient's admission status was agreed to be Admission Status: observation status to the service of Dr Rashid Gonzalez   Follow-up Information    None         Patient's Medications   Discharge Prescriptions    No medications on file     No discharge procedures on file      PDMP Review       Value Time User    PDMP Reviewed  Yes 12/6/2019 11:06 AM Lelo Clark MD          ED Provider  Electronically Signed by           Dana Molina MD  02/21/20 2403

## 2020-02-22 NOTE — ASSESSMENT & PLAN NOTE
Pt developed acute R wrist pain last night  Pain was gone when she woke up this am but returned as the day went on  Pain is worst around her wrist but radiates to tips of fingers  R thumb appears inflamed and warm to touch  Decreased ROM in all fingers  · Small collection of fluid noted on US by ED provider  Aspiration attempted but was unsuccessful    · Received Fentanyl, Dilaudid, and Morphine in ED without relief   · Will adjust pain regimen for mild, moderate and severe as well as breakthrough   · Continue IV Vancomycin - patient improving   · Appreciate Orthopedic surgery consultation & recommendations   · Will send out lyme disease PCR with AM labs

## 2020-02-22 NOTE — CONSULTS
ASSESSMENT/PLAN:      Right wrist pain and swelling    The patient verbalized understanding of exam findings and treatment plan  We engaged in the shared decision-making process and treatment options were discussed at length with the patient  Surgical and conservative management discussed today along with risks and benefits  1  WBAT RUE  2  DVT ppx per primary team  3  Patient is on IV vancomycin  4  Patient states symptoms are improving on antibiotics  No cellulitis noted, patient has full but painful wrist range of motion  Numbness noted by Dr Pj Hickman in his December office visit unchanged  5  CRP normal, WBC trending towards normal   6  Her shoulder never grew out any bacteria but had >135k WBC  We have to consider possible Lymes infection as we now have multiple joints involved, no crystals on aspiration (of shoulder last time), Normal ESR (last time), Normal CRP this time, and no bacteria isolated on shoulder joint aspiration or OR culture  7  If patient continues to improve on antibiotics, no need for advanced imaging  If redness develops or if symptoms do not continue to improve, can consider MRI  8  Will follow      ____________________________________________________________________________________________________________________________________________      CHIEF COMPLAINT:  Chief Complaint   Patient presents with    Wrist Injury     Patient states she has had right thub and wrist pain since yesterday, hurts to move, denies fall or injury        SUBJECTIVE:  Ree Menchaca is a 62y o  year old RHD female who is admitted for     Pain  Moderate  Constant  Aching and swelling in her right wrist and thumb    · NSAIDsYes   · Injections No   · Bracing/Orthotics No    · Occupational Therapy No     I have personally reviewed all the relevant PMH, PSH, SH, FH, Medications and allergies       PAST MEDICAL HISTORY:  Past Medical History:   Diagnosis Date    No known health problems     Septic arthritis [facility])        PAST SURGICAL HISTORY:  Past Surgical History:   Procedure Laterality Date    HYSTERECTOMY      SHOULDER ARTHROSCOPY Left 12/3/2019    Procedure: ARTHROSCOPY SHOULDER;  Surgeon: Kishan Connor MD;  Location: [facility];  Service: Orthopedics       FAMILY HISTORY:  History reviewed  No pertinent family history      SOCIAL HISTORY:  Social History     Tobacco Use    Smoking status: Current Every Day Smoker     Packs/day: 0 50    Smokeless tobacco: Never Used    Tobacco comment: refused   Substance Use Topics    Alcohol use: Not Currently    Drug use: No       MEDICATIONS:    Current Facility-Administered Medications:     acetaminophen (TYLENOL) tablet 650 mg, 650 mg, Oral, Q6H PRN, SADIQ Brandt    HYDROmorphone (DILAUDID) injection 1 mg, 1 mg, Intravenous, Q3H PRN, SADIQ Brandt, 1 mg at 02/22/20 0621    nicotine (NICODERM CQ) 14 mg/24hr TD 24 hr patch 1 patch, 1 patch, Transdermal, Daily, SADIQ Lobo    ondansetron (ZOFRAN) injection 4 mg, 4 mg, Intravenous, Q6H PRN, SADIQ Brandt, 4 mg at 02/22/20 2492    oxyCODONE (ROXICODONE) IR tablet 10 mg, 10 mg, Oral, Q4H PRN, SADIQ Brandt, Stopped at 02/22/20 2664    oxyCODONE (ROXICODONE) IR tablet 5 mg, 5 mg, Oral, Q4H PRN, SADIQ Brandt, 5 mg at 02/22/20 0309    vancomycin (VANCOCIN) IVPB (premix) 750 mg, 15 mg/kg, Intravenous, Q12H, SADIQ Brandt, Stopped at 02/22/20 0601    ALLERGIES:  Allergies   Allergen Reactions    Iodinated Diagnostic Agents Hives    Penicillins Hives       REVIEW OF SYSTEMS:  Review of Systems    VITALS:  Vitals:    02/22/20 0812   BP: 134/62   Pulse: 65   Resp: 18   Temp: (!) 97 4 °F (36 3 °C)   SpO2: 94%       LABS:  HgA1c: No results found for: HGBA1C  BMP:   Lab Results   Component Value Date    CALCIUM 8 6 02/22/2020    K 4 1 02/22/2020    CO2 27 02/22/2020     02/22/2020    BUN 11 02/22/2020    CREATININE 0 66 02/22/2020 _____________________________________________________  PHYSICAL EXAMINATION:  General: well developed and well nourished, alert, oriented times 3 and appears comfortable  Psychiatric: Normal  HEENT: Normocephalic, Atraumatic Trachea Midline, No torticollis  Pulmonary: No audible wheezing or respiratory distress   Cardiovascular: No pitting edema, 2+ radial pulse   Skin: No masses, erythema, lacerations, fluctation, ulcerations  Neurovascular: Sensation Intact to the Median, Ulnar, Radial Nerve, Motor Intact to the Median, Ulnar, Radial Nerve and Pulses Intact  Musculoskeletal: Normal, except as noted in detailed exam and in HPI  MUSCULOSKELETAL EXAMINATION:  Right Wrist Exam:    Negative thenar atrophy  Negative phalen's test  Negative carpal tunnel compression  Negative tinels over median nerve at the wrist   Opposition strength 5/5  Abduction strength 5/5       2 point discrimination is 4 mm throughout  ROM in wrist is passively full but painful  SILT R/U/M distribution although patient complains of dullness in radial 3 fingers  No cellulitis  No fluctuance  No wounds        ___________________________________________________  STUDIES REVIEWED:  I have personally reviewed AP lateral and oblique radiographs of right wrist which demonstrate no fracture or dislocation  Reviewed by me personally

## 2020-02-22 NOTE — H&P
H&P- Michel Watts 1960, 62 y o  female MRN: [id number]    Unit/Bed#: [facility] 301-01 Encounter: [id number]    Primary Care Provider: Gayle Gooden MD   Date and time admitted to hospital: 2/21/2020  6:49 PM        * Wrist pain, acute, right  Assessment & Plan  Pt developed acute R wrist pain last night  Pain was gone when she woke up this am but returned as the day went on  Pain is worst around her wrist but radiates to tips of fingers  R thumb appears inflamed and warm to touch  Decreased ROM in all fingers  · Small collection of fluid noted on US by ED provider  Aspiration attempted unsuccessfully  · Received Fentanyl, Dilaudid, and Morphine in ED without relief  · Increased dilaudid to 1mg IV Q3H PRN severe pain  · IV Vancomycin  · Inpatient consult to Orthopedic surgery  Leukocytosis  Assessment & Plan  WBC count 15 29  Recent history of septic joint L shoulder  Completed 2 week course of IV antibiotic  Source unknown  Now presenting with c/o R wrist and hand pain  · Initiate Vancomycin   · Monitor WBC and fever curve    Tobacco use  Assessment & Plan  Pt smokes approx 1/2 pack cigarettes per day  · Initiate nicoderm patch  · Tobacco cessation education      VTE Prophylaxis: Pt is low risk for VTE  / sequential compression device   Code Status: Full code  POLST: There is no POLST form on file for this patient (pre-hospital)  Discussion with family: No family present during admission  Anticipated Length of Stay:  Patient will be admitted on an Observation basis with an anticipated length of stay of  < 2 midnights  Justification for Hospital Stay: IV pain management    Total Time for Visit, including Counseling / Coordination of Care: 30 minutes  Greater than 50% of this total time spent on direct patient counseling and coordination of care      Chief Complaint:   R wrist pain    History of Present Illness:    Michel Watts is a 62 y o  female recent history of septic L shoulder who presents with c/o R wrist pain  Patient states that pain initially began last night  When she woke this morning pain was  As the day went on the pain gradually worsened  Patient states that the pain is worse at her wrist but radiates to her fingers  It feels like pinching pain in her wrist and pins and needles in her fingers  Blood work significant for WBC count of 15 29   X-ray of right wrist pending read  Patient was recently admitted to hospital on 12/04 and treated with 2 weeks total IV antibiotics  Initially started on Vanco and was switched to daptomycin at discharge  Review of Systems:    Review of Systems   Constitutional: Positive for activity change  Negative for appetite change, chills and fever  Respiratory: Negative for apnea, cough and shortness of breath  Cardiovascular: Negative for chest pain, palpitations and leg swelling  Gastrointestinal: Negative for abdominal distention, abdominal pain, constipation, diarrhea, nausea and vomiting  Genitourinary: Negative for dysuria  Musculoskeletal: Positive for arthralgias (joints in fingers on r hand) and joint swelling (all fingers on R hand and R wrist)  Neurological: Negative for seizures, facial asymmetry, weakness, light-headedness, numbness and headaches  Psychiatric/Behavioral: Negative for agitation and confusion  The patient is not nervous/anxious  All other systems reviewed and are negative  Past Medical and Surgical History:     Past Medical History:   Diagnosis Date    No known health problems     Septic arthritis ([facility] [address] )        Past Surgical History:   Procedure Laterality Date    HYSTERECTOMY      SHOULDER ARTHROSCOPY Left 12/3/2019    Procedure: ARTHROSCOPY SHOULDER;  Surgeon: Nova Bajwa MD;  Location:  MAIN OR;  Service: Orthopedics       Meds/Allergies:    Prior to Admission medications    Medication Sig Start Date End Date Taking?  Authorizing Provider   acetaminophen (TYLENOL) 325 mg tablet Take 2 tablets (650 mg total) by mouth every 6 (six) hours 12/6/19  Yes Braden Car MD   nicotine (NICODERM CQ) 14 mg/24hr TD 24 hr patch Place 1 patch on the skin daily  Patient not taking: Reported on 2/21/2020 12/7/19   Braden Car MD     I have reviewed home medications with patient personally  Allergies: Allergies   Allergen Reactions    Iodinated Diagnostic Agents Hives    Penicillins Hives       Social History:     Marital Status: /Civil Union   Occupation:  at grocery store  Patient Pre-hospital Living Situation: Lives with   Patient Pre-hospital Level of Mobility: Independent  Patient Pre-hospital Diet Restrictions: None  Substance Use History:   Social History     Substance and Sexual Activity   Alcohol Use Not Currently     Social History     Tobacco Use   Smoking Status Current Every Day Smoker    Packs/day: 0 50   Smokeless Tobacco Never Used   Tobacco Comment    refused     Social History     Substance and Sexual Activity   Drug Use No       Family History:    non-contributory    Physical Exam:     Vitals:   Blood Pressure: 165/71 (02/21/20 2300)  Pulse: (!) 52 (02/21/20 2300)  Temperature: 97 6 °F (36 4 °C) (02/21/20 2300)  Temp Source: Oral (02/21/20 2300)  Respirations: 22 (02/21/20 2300)  Height: 5' 8" (172 7 cm) (02/21/20 2129)  Weight - Scale: 54 7 kg (120 lb 9 6 oz) (02/21/20 2129)  SpO2: 97 % (02/21/20 2300)    Physical Exam   Constitutional: She is oriented to person, place, and time  She appears well-developed and well-nourished  No distress  HENT:   Head: Normocephalic and atraumatic  Eyes: Pupils are equal, round, and reactive to light  EOM are normal    Neck: Normal range of motion  Neck supple  Cardiovascular: Normal rate, regular rhythm, normal heart sounds and intact distal pulses  Exam reveals no gallop and no friction rub  No murmur heard  Pulmonary/Chest: Effort normal and breath sounds normal  No respiratory distress  She has no wheezes  She has no rales  Abdominal: Soft  Bowel sounds are normal  She exhibits no distension  There is no tenderness  Musculoskeletal: She exhibits tenderness (fingers and wrist on R hand)  She exhibits no edema (R thumb and wrist)  Decreased ROM in all fingers and wrist on R hand   Neurological: She is alert and oriented to person, place, and time  Skin: Skin is warm and dry  There is erythema (R thumb)  Psychiatric: She has a normal mood and affect  Judgment normal    Nursing note and vitals reviewed  Additional Data:     Lab Results: I have personally reviewed pertinent reports  Results from last 7 days   Lab Units 02/21/20 1942   WBC Thousand/uL 15 29*   HEMOGLOBIN g/dL 13 6   HEMATOCRIT % 40 0   PLATELETS Thousands/uL 266   NEUTROS PCT % 80*   LYMPHS PCT % 12*   MONOS PCT % 7   EOS PCT % 1     Results from last 7 days   Lab Units 02/21/20 1942   SODIUM mmol/L 139   POTASSIUM mmol/L 3 9   CHLORIDE mmol/L 105   CO2 mmol/L 25   BUN mg/dL 13   CREATININE mg/dL 0 82   ANION GAP mmol/L 9   CALCIUM mg/dL 8 6   GLUCOSE RANDOM mg/dL 115                       Imaging: I have personally reviewed pertinent films in PACS    XR wrist 3+ views RIGHT    (Results Pending)       EKG, Pathology, and Other Studies Reviewed on Admission:   · EKG: SR    Allscripts / Epic Records Reviewed: Yes     ** Please Note: This note has been constructed using a voice recognition system   **

## 2020-02-22 NOTE — ASSESSMENT & PLAN NOTE
Pt smokes approx 1/2 pack cigarettes per day  · Initiate nicoderm patch    · Tobacco cessation education

## 2020-02-22 NOTE — ASSESSMENT & PLAN NOTE
Pt developed acute R wrist pain last night  Pain was gone when she woke up this am but returned as the day went on  Pain is worst around her wrist but radiates to tips of fingers  R thumb appears inflamed and warm to touch  Decreased ROM in all fingers  · Small collection of fluid noted on US by ED provider  Aspiration attempted unsuccessfully  · Received Fentanyl, Dilaudid, and Morphine in ED without relief  · Increased dilaudid to 1mg IV Q3H PRN severe pain  · IV Vancomycin  · Inpatient consult to Orthopedic surgery

## 2020-02-22 NOTE — ASSESSMENT & PLAN NOTE
WBC count 15 29 initially is now trending down 11 77  Recent history of septic joint L shoulder  Completed 2 week course of IV antibiotic  Source unknown  Now presenting with c/o R wrist and hand pain     · Continue IV Vancomycin   · Monitor WBC and fever curve

## 2020-02-22 NOTE — PROGRESS NOTES
Vancomycin Assessment    Devonte Dickinson is a 62 y o  female who is currently receiving Vancomycin 750mg IV Q12H for Bone/joint infection with a goal Vancomycin trough of 15-20 as ordered by the prescriber  Relevant clinical data and objective history reviewed:  Creatinine   Date Value Ref Range Status   02/22/2020 0 66 0 60 - 1 30 mg/dL Final     Comment:     Standardized to IDMS reference method   02/21/2020 0 82 0 60 - 1 30 mg/dL Final     Comment:     Standardized to IDMS reference method   12/16/2019 0 78 0 60 - 1 30 mg/dL Final     Comment:     Standardized to IDMS reference method     /62 (BP Location: Left arm)   Pulse 65   Temp (!) 97 4 °F (36 3 °C) (Oral)   Resp 18   Ht 5' 8" (1 727 m)   Wt 54 7 kg (120 lb 9 6 oz)   SpO2 94%   BMI 18 34 kg/m²   I/O last 3 completed shifts: In: 150 [IV Piggyback:150]  Out: -   Lab Results   Component Value Date/Time    BUN 11 02/22/2020 06:25 AM    WBC 11 77 (H) 02/22/2020 06:25 AM    HGB 13 6 02/22/2020 06:25 AM    HCT 40 7 02/22/2020 06:25 AM    MCV 90 02/22/2020 06:25 AM     02/22/2020 06:25 AM     Temp Readings from Last 3 Encounters:   02/22/20 (!) 97 4 °F (36 3 °C) (Oral)   12/18/19 98 3 °F (36 8 °C) (Temporal)   12/17/19 99 2 °F (37 3 °C) (Tympanic)     Vancomycin Days of Therapy: 1    Assessment/Plan  The patient is currently on vancomycin utilizing scheduled dosing based on actual body weight  Baseline risks associated with therapy include: concomitant nephrotoxic medications and advanced age  The patient is currently receiving Vancomycin 750mg IV Q12H  This dose is clinically appropriate and will be continued  Pharmacy will also follow closely for s/sx of nephrotoxicity, infusion reactions and appropriateness of therapy  BMP and CBC will be ordered per protocol  Plan for trough as patient approaches steady state, prior to the 4th  dose on 2/23/2020 @1430    Pharmacy will continue to follow the patients culture results and clinical progress daily      Meir Alcaraz, Pharmacist

## 2020-02-22 NOTE — ASSESSMENT & PLAN NOTE
WBC count 15 29  Recent history of septic joint L shoulder  Completed 2 week course of IV antibiotic  Source unknown  Now presenting with c/o R wrist and hand pain     · Initiate Vancomycin   · Monitor WBC and fever curve

## 2020-02-22 NOTE — UTILIZATION REVIEW
Initial Clinical Review    Admission: Date/Time/Statement: Admission Orders (From admission, onward)     Ordered        02/21/20 2108  Place in Observation  Once                   Orders Placed This Encounter   Procedures    Place in Observation     Standing Status:   Standing     Number of Occurrences:   1     Order Specific Question:   Admitting Physician     Answer:   Chris Pierre [[id number]]     Order Specific Question:   Level of Care     Answer:   Med Surg [[id number]]     ED Arrival Information     Expected Arrival Acuity Means of Arrival Escorted By Service Admission Type    - 2/21/2020 18:44 Urgent Walk-In Spouse General Medicine Urgent    Arrival Complaint    right arm swelling and painful, numb        Chief Complaint   Patient presents with    Wrist Injury     Patient states she has had right thub and wrist pain since yesterday, hurts to move, denies fall or injury      Assessment/Plan:  62 y o  female recent history of septic L shoulder who presents with c/o R wrist pain  Patient states that pain initially began last night  When she woke this morning pain was  As the day went on the pain gradually worsened  Patient states that the pain is worse at her wrist but radiates to her fingers  It feels like pinching pain in her wrist and pins and needles in her fingers  Blood work significant for WBC count of 15 29   X-ray of right wrist pending read  Patient was recently admitted to hospital on 12/04 and treated with 2 weeks total IV antibiotics  Initially started on Vanco and was switched to daptomycin at discharge  Musculoskeletal: She exhibits tenderness (fingers and wrist on R hand)  She exhibits no edema (R thumb and wrist)  Decreased ROM in all fingers and wrist on R hand   * Wrist pain, acute, right  Assessment & Plan  Pt developed acute R wrist pain last night  Pain was gone when she woke up this am but returned as the day went on  Pain is worst around her wrist but radiates to tips of fingers   R thumb appears inflamed and warm to touch  Decreased ROM in all fingers  · Small collection of fluid noted on US by ED provider  Aspiration attempted unsuccessfully  · Received Fentanyl, Dilaudid, and Morphine in ED without relief  · Increased dilaudid to 1mg IV Q3H PRN severe pain  · IV Vancomycin  · Inpatient consult to Orthopedic surgery  Leukocytosis  Assessment & Plan  WBC count 15 29  Recent history of septic joint L shoulder  Completed 2 week course of IV antibiotic  Source unknown  Now presenting with c/o R wrist and hand pain  · Initiate Vancomycin   · Monitor WBC and fever curve  Tobacco use  Assessment & Plan  Pt smokes approx 1/2 pack cigarettes per day  · Initiate nicoderm patch  · Tobacco cessation education  VTE Prophylaxis: Pt is low risk for VTE  / sequential compression device   Code Status: Full code  POLST: There is no POLST form on file for this patient (pre-hospital)  Discussion with family: No family present during admission  Anticipated Length of Stay:  Patient will be admitted on an Observation basis with an anticipated length of stay of  < 2 midnights  Justification for Hospital Stay: IV pain management   orthopedic consult  The patient verbalized understanding of exam findings and treatment plan  We engaged in the shared decision-making process and treatment options were discussed at length with the patient  Surgical and conservative management discussed today along with risks and benefits  1  WBAT RUE  2  DVT ppx per primary team  3  Patient is on IV vancomycin  4  Patient states symptoms are improving on antibiotics  No cellulitis noted, patient has full but painful wrist range of motion  Numbness noted by Dr Pj Hickman in his December office visit unchanged  5  CRP normal, WBC trending towards normal   6  Her shoulder never grew out any bacteria but had >135k WBC    We have to consider possible Lymes infection as we now have multiple joints involved, no crystals on aspiration (of shoulder last time), Normal ESR (last time), Normal CRP this time, and no bacteria isolated on shoulder joint aspiration or OR culture  7  If patient continues to improve on antibiotics, no need for advanced imaging  If redness develops or if symptoms do not continue to improve, can consider MRI  8  Will follow  ED Triage Vitals [02/21/20 1851]   Temperature Pulse Respirations Blood Pressure SpO2   97 9 °F (36 6 °C) 58 16 160/98 98 %      Temp Source Heart Rate Source Patient Position - Orthostatic VS BP Location FiO2 (%)   Temporal Monitor Sitting Left arm --      Pain Score       8        Wt Readings from Last 1 Encounters:   02/21/20 54 7 kg (120 lb 9 6 oz)     Additional Vital Signs:   02/22/20 0812  97 4 °F (36 3 °C)Abnormal   65  18  134/62  89  94 %  None (Room air)  Lying   02/21/20 2300  97 6 °F (36 4 °C)  52Abnormal   22  165/71  102  97 %  None (Room air)  Lying   02/21/20 2129  98 °F (36 7 °C)  54Abnormal   20  150/98  118  94 %  None (Room air)  Sitting   02/21/20 2100    60    169/79  113  97 %       02/21/20 2030    55    153/70  101  96 %       02/21/20 2015    52Abnormal     152/69  99  97 %       02/21/20 1900    52Abnormal     139/67  96  98 %         Pertinent Labs/Diagnostic Test Results:   xr right wrist  No acute osseous abnormality    Results from last 7 days   Lab Units 02/22/20 0625 02/21/20 1942   WBC Thousand/uL 11 77* 15 29*   HEMOGLOBIN g/dL 13 6 13 6   HEMATOCRIT % 40 7 40 0   PLATELETS Thousands/uL 215 266   NEUTROS ABS Thousands/µL 8 95* 12 25*     Results from last 7 days   Lab Units 02/22/20 0625 02/21/20 1942   SODIUM mmol/L 138 139   POTASSIUM mmol/L 4 1 3 9   CHLORIDE mmol/L 104 105   CO2 mmol/L 27 25   ANION GAP mmol/L 7 9   BUN mg/dL 11 13   CREATININE mg/dL 0 66 0 82   EGFR ml/min/1 73sq m 96 77   CALCIUM mg/dL 8 6 8 6   MAGNESIUM mg/dL 1 9  --      Results from last 7 days   Lab Units 02/22/20 0625 02/21/20 1942   GLUCOSE RANDOM mg/dL 106 115     Results from last 7 days   Lab Units 02/21/20 1942   CRP mg/L 1 8     ED Treatment:   Medication Administration from 02/21/2020 1844 to 02/21/2020 2128       Date/Time Order Dose Route Action Action by Comments     02/21/2020 1942 morphine (PF) 4 mg/mL injection 4 mg 4 mg Intravenous Given Remedios Monsalve RN      02/21/2020 2021 fentanyl citrate (PF) 100 MCG/2ML 25 mcg 25 mcg Intravenous Given Remedios Monsalve RN      02/21/2020 2030 lidocaine (PF) (XYLOCAINE-MPF) 1 % injection 10 mL 10 mL Infiltration Given Remedios Monsalve RN Lidocaine thrown out prior to scanning     02/21/2020 2105 HYDROmorphone (DILAUDID) injection 0 5 mg 0 5 mg Intravenous Given Remedios Monsalve RN         Past Medical History:   Diagnosis Date    No known health problems     Septic arthritis ([facility] [address] )      Present on Admission:   Leukocytosis   Tobacco use      Admitting Diagnosis: Right arm numbness [R20 0]  Right wrist sprain, initial encounter [S63 501A]  Swelling of joint of upper arm, right [M25 421]  Age/Sex: 62 y o  female  Admission Orders:  Scheduled Medications:    Medications:  nicotine 1 patch Transdermal Daily   vancomycin 15 mg/kg Intravenous Q12H     Continuous IV Infusions:     PRN Meds:    acetaminophen 650 mg Oral Q6H PRN   HYDROmorphone 1 mg Intravenous Q3H PRN   ondansetron 4 mg Intravenous Q6H PRN   oxyCODONE 10 mg Oral Q4H PRN   oxyCODONE 5 mg Oral Q4H PRN       IP CONSULT TO ORTHOPEDIC SURGERY    Network Utilization Review Department  [email or web address]  ATTENTION: Please call with any questions or concerns to [phone] and carefully listen to the prompts so that you are directed to the right person  All voicemails are confidential   Brandy Pathak all requests for admission clinical reviews, approved or denied determinations and any other requests to dedicated fax number below belonging to the campus where the patient is receiving treatment   List of dedicated fax numbers for the Facilities:  FACILITY NAME UR FAX NUMBER   ADMISSION DENIALS (Administrative/Medical Necessity) [phone]   PARENT CHILD HEALTH (Maternity/NICU/Pediatrics) [phone]   [facility] [phone]   Escobar Evans [phone]   [address] [place] [address] [phone]   [address] [phone]   Larence Cage [phone]   [address] [facility]  [address] [facility] [address] [facility] [phone]

## 2020-02-22 NOTE — PLAN OF CARE
Problem: SKIN/TISSUE INTEGRITY - ADULT  Goal: Incision(s), wounds(s) or drain site(s) healing without S/S of infection  Description  INTERVENTIONS  - Assess and document risk factors for skin impairment   - Assess and document dressing, incision, wound bed, drain sites and surrounding tissue  - Consider nutrition services referral as needed  - Oral mucous membranes remain intact  - Provide patient/ family education  Outcome: Progressing     Problem: PAIN - ADULT  Goal: Verbalizes/displays adequate comfort level or baseline comfort level  Description  Interventions:  - Encourage patient to monitor pain and request assistance  - Assess pain using appropriate pain scale  - Administer analgesics based on type and severity of pain and evaluate response  - Implement non-pharmacological measures as appropriate and evaluate response  - Consider cultural and social influences on pain and pain management  - Notify physician/advanced practitioner if interventions unsuccessful or patient reports new pain  Outcome: Progressing     Problem: SAFETY ADULT  Goal: Maintain or return to baseline ADL function  Description  INTERVENTIONS:  -  Assess patient's ability to carry out ADLs; assess patient's baseline for ADL function and identify physical deficits which impact ability to perform ADLs (bathing, care of mouth/teeth, toileting, grooming, dressing, etc )  - Assess/evaluate cause of self-care deficits   - Assess range of motion  - Assess patient's mobility; develop plan if impaired  - Assess patient's need for assistive devices and provide as appropriate  - Encourage maximum independence but intervene and supervise when necessary  - Involve family in performance of ADLs  - Assess for home care needs following discharge   - Consider OT consult to assist with ADL evaluation and planning for discharge  - Provide patient education as appropriate  Outcome: Progressing     Problem: INFECTION - ADULT  Goal: Absence or prevention of progression during hospitalization  Description  INTERVENTIONS:  - Assess and monitor for signs and symptoms of infection  - Monitor lab/diagnostic results  - Monitor all insertion sites, i e  indwelling lines, tubes, and drains  - Monitor endotracheal if appropriate and nasal secretions for changes in amount and color  - [place] appropriate cooling/warming therapies per order  - Administer medications as ordered  - Instruct and encourage patient and family to use good hand hygiene technique  - Identify and instruct in appropriate isolation precautions for identified infection/condition  Outcome: Progressing

## 2020-02-22 NOTE — ASSESSMENT & PLAN NOTE
Pt developed acute R wrist pain last night  Pain was gone when she woke up this am but returned as the day went on  Pain is worst around her wrist but radiates to tips of fingers  R thumb appears inflamed and warm to touch  Decreased ROM in all fingers  · Small collection of fluid noted on US by ED provider  Aspiration attempted but was unsuccessful  · Received Fentanyl, Dilaudid, and Morphine in ED without relief   · Will adjust pain regimen for mild, moderate and severe as well as breakthrough   · Continue IV Vancomycin, Doxy- pt's pain improved, but swelling redness worsening  · Ortho consulted- no surgical intervention  · Agree that this may be auto-immune, however we need to rule out infection first and since we have no fluid cx, we need to rely on how patient improves or does not improve with antibiotics before adding steroids, etc  ESR ordered  · Lyme PCR pending  · ID consult placed  Blood cultures ordered

## 2020-02-23 PROBLEM — M00.9 PYOGENIC ARTHRITIS OF LEFT SHOULDER REGION (HCC): Status: RESOLVED | Noted: 2019-12-02 | Resolved: 2020-02-23

## 2020-02-23 LAB
ANION GAP SERPL CALCULATED.3IONS-SCNC: 6 MMOL/L (ref 4–13)
BUN SERPL-MCNC: 7 MG/DL (ref 5–25)
CALCIUM SERPL-MCNC: 8.4 MG/DL (ref 8.3–10.1)
CHLORIDE SERPL-SCNC: 105 MMOL/L (ref 100–108)
CO2 SERPL-SCNC: 30 MMOL/L (ref 21–32)
CREAT SERPL-MCNC: 0.76 MG/DL (ref 0.6–1.3)
ERYTHROCYTE [DISTWIDTH] IN BLOOD BY AUTOMATED COUNT: 12 % (ref 11.6–15.1)
ERYTHROCYTE [SEDIMENTATION RATE] IN BLOOD: 8 MM/HOUR (ref 0–15)
GFR SERPL CREATININE-BSD FRML MDRD: 85 ML/MIN/1.73SQ M
GLUCOSE P FAST SERPL-MCNC: 96 MG/DL (ref 65–99)
GLUCOSE SERPL-MCNC: 96 MG/DL (ref 65–140)
HCT VFR BLD AUTO: 39.9 % (ref 34.8–46.1)
HGB BLD-MCNC: 13.3 G/DL (ref 11.5–15.4)
MAGNESIUM SERPL-MCNC: 2 MG/DL (ref 1.6–2.6)
MCH RBC QN AUTO: 30.6 PG (ref 26.8–34.3)
MCHC RBC AUTO-ENTMCNC: 33.3 G/DL (ref 31.4–37.4)
MCV RBC AUTO: 92 FL (ref 82–98)
PLATELET # BLD AUTO: 184 THOUSANDS/UL (ref 149–390)
PMV BLD AUTO: 10.3 FL (ref 8.9–12.7)
POTASSIUM SERPL-SCNC: 3.6 MMOL/L (ref 3.5–5.3)
PROCALCITONIN SERPL-MCNC: <0.05 NG/ML
RBC # BLD AUTO: 4.34 MILLION/UL (ref 3.81–5.12)
SODIUM SERPL-SCNC: 141 MMOL/L (ref 136–145)
WBC # BLD AUTO: 6.84 THOUSAND/UL (ref 4.31–10.16)

## 2020-02-23 PROCEDURE — 86592 SYPHILIS TEST NON-TREP QUAL: CPT | Performed by: INTERNAL MEDICINE

## 2020-02-23 PROCEDURE — 80048 BASIC METABOLIC PNL TOTAL CA: CPT | Performed by: NURSE PRACTITIONER

## 2020-02-23 PROCEDURE — 99231 SBSQ HOSP IP/OBS SF/LOW 25: CPT | Performed by: ORTHOPAEDIC SURGERY

## 2020-02-23 PROCEDURE — 87040 BLOOD CULTURE FOR BACTERIA: CPT | Performed by: FAMILY MEDICINE

## 2020-02-23 PROCEDURE — 86038 ANTINUCLEAR ANTIBODIES: CPT | Performed by: INTERNAL MEDICINE

## 2020-02-23 PROCEDURE — 99231 SBSQ HOSP IP/OBS SF/LOW 25: CPT | Performed by: FAMILY MEDICINE

## 2020-02-23 PROCEDURE — 86430 RHEUMATOID FACTOR TEST QUAL: CPT | Performed by: INTERNAL MEDICINE

## 2020-02-23 PROCEDURE — 83735 ASSAY OF MAGNESIUM: CPT | Performed by: NURSE PRACTITIONER

## 2020-02-23 PROCEDURE — 84145 PROCALCITONIN (PCT): CPT | Performed by: FAMILY MEDICINE

## 2020-02-23 PROCEDURE — 86200 CCP ANTIBODY: CPT | Performed by: INTERNAL MEDICINE

## 2020-02-23 PROCEDURE — 85652 RBC SED RATE AUTOMATED: CPT | Performed by: INTERNAL MEDICINE

## 2020-02-23 PROCEDURE — 85027 COMPLETE CBC AUTOMATED: CPT | Performed by: NURSE PRACTITIONER

## 2020-02-23 RX ORDER — METHYLPREDNISOLONE SODIUM SUCCINATE 40 MG/ML
40 INJECTION, POWDER, LYOPHILIZED, FOR SOLUTION INTRAMUSCULAR; INTRAVENOUS ONCE
Status: COMPLETED | OUTPATIENT
Start: 2020-02-23 | End: 2020-02-23

## 2020-02-23 RX ORDER — PREDNISONE 20 MG/1
20 TABLET ORAL DAILY
Status: DISCONTINUED | OUTPATIENT
Start: 2020-02-24 | End: 2020-02-24

## 2020-02-23 RX ADMIN — METHYLPREDNISOLONE SODIUM SUCCINATE 40 MG: 40 INJECTION, POWDER, FOR SOLUTION INTRAMUSCULAR; INTRAVENOUS at 16:12

## 2020-02-23 RX ADMIN — NICOTINE 1 PATCH: 14 PATCH TRANSDERMAL at 15:11

## 2020-02-23 RX ADMIN — ACETAMINOPHEN 650 MG: 325 TABLET ORAL at 03:45

## 2020-02-23 RX ADMIN — NICOTINE 1 PATCH: 14 PATCH TRANSDERMAL at 08:38

## 2020-02-23 RX ADMIN — ENOXAPARIN SODIUM 40 MG: 40 INJECTION SUBCUTANEOUS at 08:37

## 2020-02-23 RX ADMIN — DOXYCYCLINE 100 MG: 100 INJECTION, POWDER, LYOPHILIZED, FOR SOLUTION INTRAVENOUS at 08:44

## 2020-02-23 RX ADMIN — ACETAMINOPHEN 650 MG: 325 TABLET ORAL at 08:37

## 2020-02-23 RX ADMIN — ACETAMINOPHEN 650 MG: 325 TABLET ORAL at 21:06

## 2020-02-23 RX ADMIN — ACETAMINOPHEN 650 MG: 325 TABLET ORAL at 15:10

## 2020-02-23 RX ADMIN — VANCOMYCIN HYDROCHLORIDE 750 MG: 750 INJECTION, SOLUTION INTRAVENOUS at 03:45

## 2020-02-23 NOTE — PROGRESS NOTES
Vancomycin IV Pharmacy-to-Dose Consultation    Jeimy Alcantar is a 62 y o  female who is no longer receiving Vancomycin IV   It has been discontinued by the provider  Pharmacy will sign off with respect to Vancomycin dosing and management  The previously ordered Vancomycin trough and order for Pharmacy general consult will be discontinued as well  Thank you for the opportunity to provide Pharmacy Consult services      Meir Alcaraz, Pharmacist

## 2020-02-23 NOTE — ASSESSMENT & PLAN NOTE
WBC count 15 29>11 77>6  84  Recent history of septic joint L shoulder  Completed 2 week course of IV antibiotic  Source unknown  Now presenting with c/o R wrist and hand pain     · Continue IV Vancomycin, doxyc  · Monitor WBC and fever curve

## 2020-02-23 NOTE — CONSULTS
Consultation - Infectious Disease   Keith Rivera 62 y o  female MRN: [id number]  Unit/Bed#: -01 Encounter: [id number]    Assessment    Acute on chronic right wrist pain differential diagnosis includes septic arthritis osteomyelitis rheumatological manifestation / synovitis  -blood cultures on admission negative  -Lyme titers negative  -orthopedic surgery currently following  -white blood cell count on admission 11k /afebrile  -CRP 1 8  -X ray of the right wrist negative    History of left shoulder septic arthritis  -135,000 WBCs from synovial fluid analysis with 96% being neutrophils  -status post completion of 4-6 wks of treatment 12/2019      Plan    The patient does not appear to be septic/patient is afebrile and does not have a profound leukocytosis  There is no obvious signs of cellulitis on the wrist    I would recommend discontinue all intravenous and oral antibiotics; discontinue vancomycin and doxycycline    I will order anti CCP, rheumatoid factor, and MARK, RPR, acute hepatitis panel, and ESR for the morning  Also order a noncontrast MRI of the right wrist as well   Recommend supportive care and anti-inflammatories in the meanwhile  Continue current medical management        History of Present Illness   Physician Requesting Consult: Rui Ariza DO  Reason for Consult / Principal Problem:  Right wrist pain    HPI: Keith Rivera is a 62y o  year old female with H/O   Left-sided septic arthritis of the shoulder status post intravenous and oral antibiotic regiment back in December 2019 who comes in for complaint of right wrist pain the symptoms have been going on and off for the past couple of months the patient denies acute injury to the wrist she admits that it is a pinching and stabbing pain that comes and goes with pain radiating towards her forearm there are no obvious cellulitic changes    The patient started on empiric vancomycin doxycycline the patient's Lyme titers were negative blood cultures negative the patient is afebrile and leukocytosis is now down to less than 10,000 infectious Diseases was contacted for medical management    Consults    REVIEW OF SYSTEMS    GENERAL/CONSTITUTIONAL: The patient denies fever, fatigue, weakness, weight gain or weight loss  HEAD, EYES, EARS, NOSE AND THROAT: Eyes  The patient denies pain, redness, loss of vision, double or blurred vision, flashing lights or spots, dryness, sore throats, and hoarseness   CARDIOVASCULAR: The patient denies chest pain, irregular heartbeats, sudden changes in heartbeat or palpitation, shortness of breath, difficulty breathing at night, swollen legs RESPIRATORY: The patient denies chronic dry cough, coughing up blood, coughing up mucus and wheezing  GASTROINTESTINAL: The patient denies decreased appetite, nausea, vomiting, vomiting blood or coffee ground material, heartburn  GENITOURINARY: The patient denies difficult urination, pain or burning with urination, blood in the urine, cloudy or smoky urine, frequent need to urinate  MUSCULOSKELETAL: The patient denies arm, buttock, thigh or calf cramps  No joint or muscle pain  No muscle weakness or tenderness  No joint swelling, neck pain, back pain or major orthopedic injuries  SKIN AND BREASTS: The patient denies easy bruising, skin redness, skin rash, hives, sensitivity to sun exposure, tightness, nodules or bumps, hair loss, color changes in the hands or feet with cold, breast lump, breast pain or nipple discharge  NEUROLOGIC: The patient denies headache, dizziness, fainting, muscle spasm, loss of consciousness, sensitivity or pain in the hands and feet or memory loss  PSYCHIATRIC: The patient denies depression with thoughts of suicide, voices in ?? head telling ? ? to do things and has not been seen for psychiatric counseling or treatment    ENDOCRINE: The patient denies intolerance to hot or cold temperature, flushing, fingernail changes, increased thirst, increased salt intake or decreased sexual desire  HEMATOLOGIC/LYMPHATIC: The patient denies anemia, bleeding tendency or clotting tendency  ALLERGIC/IMMUNOLOGIC: The patient denies rhinitis, asthma, skin sensitivity, latex allergies or sensitivity  Historical Information   Past Medical History:   Diagnosis Date    No known health problems     Septic arthritis ([address] )      Past Surgical History:   Procedure Laterality Date    HYSTERECTOMY      SHOULDER ARTHROSCOPY Left 12/3/2019    Procedure: ARTHROSCOPY SHOULDER;  Surgeon: Peña Bingham MD;  Location: [facility] OR;  Service: Orthopedics     Social History   Social History     Substance and Sexual Activity   Alcohol Use Not Currently     Social History     Substance and Sexual Activity   Drug Use No     Social History     Tobacco Use   Smoking Status Current Every Day Smoker    Packs/day: 0 50   Smokeless Tobacco Never Used   Tobacco Comment    refused     History reviewed  No pertinent family history      Meds/Allergies   MEDS:      Current Facility-Administered Medications:     acetaminophen (TYLENOL) tablet 650 mg, 650 mg, Oral, Q6H [address], Emanuel J Geishauser, DO, 650 mg at 02/23/20 1510    enoxaparin (LOVENOX) subcutaneous injection 40 mg, 40 mg, Subcutaneous, Q24H NILA, RELL AraizaNP, 40 mg at 02/23/20 0837    nicotine (NICODERM CQ) 14 mg/24hr TD 24 hr patch 1 patch, 1 patch, Transdermal, Daily, SADIQ Lobo, 1 patch at 02/23/20 0838    ondansetron (ZOFRAN) injection 4 mg, 4 mg, Intravenous, Q6H PRN, RELL SinghNP, 4 mg at 02/22/20 1811    oxyCODONE (ROXICODONE) IR tablet 2 5 mg, 2 5 mg, Oral, Q4H PRN, RELL CabralNP    oxyCODONE (ROXICODONE) IR tablet 5 mg, 5 mg, Oral, Q4H PRN, SADIQ Cabral    Allergies   Allergen Reactions    Iodinated Diagnostic Agents Hives    Penicillins Hives         Intake/Output Summary (Last 24 hours) at 2/23/2020 1511  Last data filed at 2/23/2020 1230  Gross per 24 hour   Intake 1260 ml   Output    Net 1260 ml         Physical Exam:     GENERAL APPEARANCE: Well developed, well nourished, in no acute distress  SKIN: Inspection of the skin reveals no rashes, ulcerations or petechiae  HEENT: The sclerae were anicteric and conjunctivae were pink and moist  Extraocular movements were intact and pupils were equal, round, and reactive to light with normal accommodation  NECK: Supple and symmetric  There was no thyroid enlargement, and no tenderness, or masses were felt  CHEST: Normal AP diameter and normal contour without any kyphoscoliosis  LUNGS: Auscultation of the lungs revealed normal breath sounds without any other adventitious sounds or rubs  CARDIOVASCULAR: There was a regular rate and rhythm without any murmurs, gallops, rubs  The carotid pulses were normal and 2+ bilaterally without bruits  ABDOMEN: Soft and nontender with normal bowel sounds  No ascites was noted  LYMPH NODES: No lymphadenopathy was appreciated in the neck, axillae or groin  MUSCULOSKELETAL: Gait was normal  There was no tenderness or effusions noted  Muscle strength and tone were normal   EXTREMITIES:  Pain and swelling located on the right wrist no cellulitic changes no open lesion  NEUROLOGIC: Alert and oriented x 3  Normal affect  Gait was normal  Normal deep tendon reflexes with no pathological reflexes  Sensation to touch was normal     Invasive Devices:   Peripheral IV 02/23/20 Left Forearm (Active)   Site Assessment Clean;Dry; Intact 2/23/2020  9:29 AM   Dressing Type Transparent 2/23/2020  9:29 AM   Line Status Blood return noted; Flushed;Saline locked 2/23/2020  9:29 AM   Dressing Status Clean; Intact;Dry 2/23/2020  9:29 AM           Lab Results:   Admission on 02/21/2020   Component Date Value    WBC 02/21/2020 15 29*    RBC 02/21/2020 4 47     Hemoglobin 02/21/2020 13 6     Hematocrit 02/21/2020 40 0     MCV 02/21/2020 90     MCH 02/21/2020 30 4     MCHC 02/21/2020 34 0     RDW 02/21/2020 12 0  MPV 02/21/2020 10 3     Platelets 04/64/5146 266     nRBC 02/21/2020 0     Neutrophils Relative 02/21/2020 80*    Immat GRANS % 02/21/2020 0     Lymphocytes Relative 02/21/2020 12*    Monocytes Relative 02/21/2020 7     Eosinophils Relative 02/21/2020 1     Basophils Relative 02/21/2020 0     Neutrophils Absolute 02/21/2020 12 25*    Immature Grans Absolute 02/21/2020 0 05     Lymphocytes Absolute 02/21/2020 1 84     Monocytes Absolute 02/21/2020 1 02     Eosinophils Absolute 02/21/2020 0 07     Basophils Absolute 02/21/2020 0 06     Sodium 02/21/2020 139     Potassium 02/21/2020 3 9     Chloride 02/21/2020 105     CO2 02/21/2020 25     ANION GAP 02/21/2020 9     BUN 02/21/2020 13     Creatinine 02/21/2020 0 82     Glucose 02/21/2020 115     Calcium 02/21/2020 8 6     eGFR 02/21/2020 77     CRP 02/21/2020 1 8     Sodium 02/22/2020 138     Potassium 02/22/2020 4 1     Chloride 02/22/2020 104     CO2 02/22/2020 27     ANION GAP 02/22/2020 7     BUN 02/22/2020 11     Creatinine 02/22/2020 0 66     Glucose 02/22/2020 106     Glucose, Fasting 02/22/2020 106*    Calcium 02/22/2020 8 6     eGFR 02/22/2020 96     Magnesium 02/22/2020 1 9     WBC 02/22/2020 11 77*    RBC 02/22/2020 4 51     Hemoglobin 02/22/2020 13 6     Hematocrit 02/22/2020 40 7     MCV 02/22/2020 90     MCH 02/22/2020 30 2     MCHC 02/22/2020 33 4     RDW 02/22/2020 12 0     MPV 02/22/2020 10 1     Platelets 90/31/2180 215     nRBC 02/22/2020 0     Neutrophils Relative 02/22/2020 77*    Immat GRANS % 02/22/2020 0     Lymphocytes Relative 02/22/2020 14     Monocytes Relative 02/22/2020 8     Eosinophils Relative 02/22/2020 1     Basophils Relative 02/22/2020 0     Neutrophils Absolute 02/22/2020 8 95*    Immature Grans Absolute 02/22/2020 0 04     Lymphocytes Absolute 02/22/2020 1 67     Monocytes Absolute 02/22/2020 0 94     Eosinophils Absolute 02/22/2020 0 12     Basophils Absolute 02/22/2020 0 05     Sodium 02/23/2020 141     Potassium 02/23/2020 3 6     Chloride 02/23/2020 105     CO2 02/23/2020 30     ANION GAP 02/23/2020 6     BUN 02/23/2020 7     Creatinine 02/23/2020 0 76     Glucose 02/23/2020 96     Glucose, Fasting 02/23/2020 96     Calcium 02/23/2020 8 4     eGFR 02/23/2020 85     WBC 02/23/2020 6 84     RBC 02/23/2020 4 34     Hemoglobin 02/23/2020 13 3     Hematocrit 02/23/2020 39 9     MCV 02/23/2020 92     MCH 02/23/2020 30 6     MCHC 02/23/2020 33 3     RDW 02/23/2020 12 0     Platelets 33/46/4867 184     MPV 02/23/2020 10 3     Magnesium 02/23/2020 2 0     Procalcitonin 02/23/2020 <0 05          Culture  Lab Results   Component Value Date    BLOODCX No Growth After 5 Days  12/03/2019    BLOODCX No Growth After 5 Days  12/03/2019     No results found for: WOUNDCULT  No results found for: URINECX  No results found for: SPUTUMCULTUR    Principal Problem:    Wrist pain, acute, right  Active Problems:    Leukocytosis    Tobacco use      Labs:  CBC w/diff  Recent Labs     02/22/20  0625 02/23/20  0504   WBC 11 77* 6 84   HGB 13 6 13 3   HCT 40 7 39 9    184   NEUTOPHILPCT 77*  --    LYMPHOPCT 14  --    MONOPCT 8  --    EOSPCT 1  --      BMP  Recent Labs     02/23/20  0504   K 3 6      CO2 30   BUN 7   CREATININE 0 76   CALCIUM 8 4     CMP  Recent Labs     02/23/20  0504   K 3 6      CO2 30   BUN 7   CREATININE 0 76   CALCIUM 8 4        labrc    Cultures:  Lab Results   Component Value Date    BLOODCX No Growth After 5 Days  12/03/2019    BLOODCX No Growth After 5 Days   12/03/2019     No results found for: WOUNDCULT  No results found for: URINECX  No results found for: SPUTUMCULTUR    MED:      Current Facility-Administered Medications:     acetaminophen (TYLENOL) tablet 650 mg, 650 mg, Oral, Q6H [facility], Emanuel Al DO, 650 mg at 02/23/20 1510    enoxaparin (LOVENOX) subcutaneous injection 40 mg, 40 mg, Subcutaneous, Q24H [facility], [facility] SADIQ Clements, 40 mg at 02/23/20 0837    nicotine (NICODERM CQ) 14 mg/24hr TD 24 hr patch 1 patch, 1 patch, Transdermal, Daily, SADIQ Lobo, 1 patch at 02/23/20 0838    ondansetron (ZOFRAN) injection 4 mg, 4 mg, Intravenous, Q6H PRN, SADIQ Carrizales, 4 mg at 02/22/20 1811    oxyCODONE (ROXICODONE) IR tablet 2 5 mg, 2 5 mg, Oral, Q4H PRN, SADIQ Vargas    oxyCODONE (ROXICODONE) IR tablet 5 mg, 5 mg, Oral, Q4H PRN, SADIQ Vargas    Principal Problem:    Wrist pain, acute, right  Active Problems:    Leukocytosis    Tobacco use      Em Siddiqi, DO

## 2020-02-23 NOTE — PROGRESS NOTES
Progress Note - Katrina Mckeon 1960, 62 y o  female MRN: [id number]    Unit/Bed#: -01 Encounter: [id number]    Primary Care Provider: Jeremiah Ayala MD   Date and time admitted to hospital: 2/21/2020  6:49 PM        Tobacco use  Assessment & Plan  Pt smokes approx 1/2 pack cigarettes per day  · Continue nicoderm patch  · Tobacco cessation education      Leukocytosis  Assessment & Plan  WBC count 15 29>11 77>6  84  Recent history of septic joint L shoulder  Completed 2 week course of IV antibiotic  Source unknown  Now presenting with c/o R wrist and hand pain  · Continue IV Vancomycin, doxyc  · Monitor WBC and fever curve    * Wrist pain, acute, right  Assessment & Plan  Pt developed acute R wrist pain last night  Pain was gone when she woke up this am but returned as the day went on  Pain is worst around her wrist but radiates to tips of fingers  R thumb appears inflamed and warm to touch  Decreased ROM in all fingers  · Small collection of fluid noted on US by ED provider  Aspiration attempted but was unsuccessful  · Received Fentanyl, Dilaudid, and Morphine in ED without relief   · Will adjust pain regimen for mild, moderate and severe as well as breakthrough   · Continue IV Vancomycin, Doxy- pt's pain improved, but swelling redness worsening  · Ortho consulted- no surgical intervention  · Agree that this may be auto-immune, however we need to rule out infection first and since we have no fluid cx, we need to rely on how patient improves or does not improve with antibiotics before adding steroids, etc  ESR ordered  · Lyme PCR pending  · ID consult placed  Blood cultures ordered  VTE Pharmacologic Prophylaxis:   Pharmacologic: Enoxaparin (Lovenox)  Mechanical VTE Prophylaxis in Place: Yes    Patient Centered Rounds: I have performed bedside rounds with nursing staff today      Discussions with Specialists or Other Care Team Provider: ortho, ID    Education and Discussions with Family / Patient: pt and     Time Spent for Care: 30 minutes  More than 50% of total time spent on counseling and coordination of care as described above  Current Length of Stay: 0 day(s)    Current Patient Status: Inpatient   Certification Statement: The patient will continue to require additional inpatient hospital stay due to see plan as above    Discharge Plan:  home    Code Status: Level 1 - Full Code      Subjective:   No acute events overnight  Pt's pain is being managed better but agree with pt that the swelling, erythema is slightly worse this aM  Objective:     Vitals:   Temp (24hrs), Av 1 °F (36 7 °C), Min:98 °F (36 7 °C), Max:98 2 °F (36 8 °C)    Temp:  [98 °F (36 7 °C)-98 2 °F (36 8 °C)] 98 °F (36 7 °C)  HR:  [46-51] 51  Resp:  [16-18] 16  BP: (122-139)/(57-65) 139/65  SpO2:  [97 %-98 %] 97 %  Body mass index is 18 34 kg/m²  Input and Output Summary (last 24 hours): Intake/Output Summary (Last 24 hours) at 2020 1114  Last data filed at 2020 0900  Gross per 24 hour   Intake 1020 ml   Output    Net 1020 ml       Physical Exam:     Physical Exam   Constitutional: No distress  HENT:   Head: Normocephalic and atraumatic  Eyes: Conjunctivae are normal    Cardiovascular: Normal rate and regular rhythm  No murmur heard  Pulmonary/Chest: Effort normal and breath sounds normal  No respiratory distress  She has no wheezes  She has no rales  Abdominal: Soft  Bowel sounds are normal  She exhibits no distension  There is no tenderness  Musculoskeletal: She exhibits no edema or tenderness  Decreased R hand ROM- cannot make a fist- due to edema, pain    R wrist hand with edema and warmth   Neurological: She is alert  Skin: Skin is warm and dry  She is not diaphoretic  Psychiatric: She has a normal mood and affect  Nursing note and vitals reviewed          Additional Data:     Labs:    Results from last 7 days   Lab Units 20  0504 20  0625   WBC Thousand/uL 6 84 11 77*   HEMOGLOBIN g/dL 13 3 13 6   HEMATOCRIT % 39 9 40 7   PLATELETS Thousands/uL 184 215   NEUTROS PCT %  --  77*   LYMPHS PCT %  --  14   MONOS PCT %  --  8   EOS PCT %  --  1     Results from last 7 days   Lab Units 02/23/20  0504   SODIUM mmol/L 141   POTASSIUM mmol/L 3 6   CHLORIDE mmol/L 105   CO2 mmol/L 30   BUN mg/dL 7   CREATININE mg/dL 0 76   ANION GAP mmol/L 6   CALCIUM mg/dL 8 4   GLUCOSE RANDOM mg/dL 96                           * I Have Reviewed All Lab Data Listed Above  * Additional Pertinent Lab Tests Reviewed: [address] Admission Reviewed    Imaging:    Imaging Reports Reviewed Today Include: XR wrist      Recent Cultures (last 7 days):           Last 24 Hours Medication List:     Current Facility-Administered Medications:  acetaminophen 650 mg Oral Q6H [address] Emanuel Al DO    doxycycline 100 mg Intravenous Q12H Dunia Huffman DO Last Rate: Stopped (02/23/20 0900)   enoxaparin 40 mg Subcutaneous Q24H [address] SADIQ Araiza    nicotine 1 patch Transdermal Daily SADIQ Lobo    ondansetron 4 mg Intravenous Q6H PRN Jud Bill, SADIQ    oxyCODONE 2 5 mg Oral Q4H PRN Vielka Repine, RELLNP    oxyCODONE 5 mg Oral Q4H PRN Vielka Repine, CRNP    vancomycin 15 mg/kg Intravenous Q12H SADIQ Andersen Last Rate: Stopped (02/23/20 0445)        Today, Patient Was Seen By: Dunia Huffman DO    ** Please Note: Dictation voice to text software may have been used in the creation of this document   **

## 2020-02-23 NOTE — UTILIZATION REVIEW
Continued Stay Review    Date: 2/23/2020                         Current Patient Class:  Observation status     Pt initially placed in Observation status on  2/21 @ 2109 changed to inpatient admission  On 2/23 @ 11:05 due to worsening  edema and erythema despite treatment in Observation status  02/23/20 75378  Inpatient Admission    Transfer Service: Hospitalist       Question Answer   Admitting Physician MARLENE Bearden   Level of Care Med Surg   Estimated length of stay More than 2 Midnights   Certification I certify that inpatient services are medically necessary for this patient for a duration of greater than two midnights  See H&P and MD Progress Notes for additional information about the patient's course of treatment  Current Level of Care:  Med surg       HPI:59 y o  female initially admitted on 2/21/2020 -  With pain and decreased ROM in her right thumb and wrist  Orthopedics following  Leukocytosis  WBC  To 15 29, started on Vanco - chnaged to Doxycycline  Fr possible Lyme infection  due to multiple joints involved , Prior shoulder pain  s/p aspiration  Never grew out any bacteria  Assessment/Plan:  WBC normalized -  Pt agrees that the swelling and erythema is worse this AM    Pt developed acute R wrist pain last night  Pain was gone when she woke up this am but returned as the day went on  Pain is worst around her wrist but radiates to tips of fingers  R thumb appears inflamed and warm to touch  Decreased ROM in all fingers  Agree that this could be auto-immune  But need to rule out infection- plan continue abx;s ID consult -  Prior to adding steroids           Ortho 2/23 -  conservative management - on  abx's  - doxycycline - no surgical intervention  Could consider rheumatoid workup, or ID consult    Infectious Disease consulted on 2/23 - pending     Pertinent Labs/Diagnostic Results:   Results from last 7 days   Lab Units 02/23/20  0504 02/22/20  0625 02/21/20  1942   WBC Thousand/uL 6 84 11 77* 15 29*   HEMOGLOBIN g/dL 13 3 13 6 13 6   HEMATOCRIT % 39 9 40 7 40 0   PLATELETS Thousands/uL 184 215 266   NEUTROS ABS Thousands/µL  --  8 95* 12 25*     Results from last 7 days   Lab Units 02/23/20  0504 02/22/20  0625 02/21/20 1942   SODIUM mmol/L 141 138 139   POTASSIUM mmol/L 3 6 4 1 3 9   CHLORIDE mmol/L 105 104 105   CO2 mmol/L 30 27 25   ANION GAP mmol/L 6 7 9   BUN mg/dL 7 11 13   CREATININE mg/dL 0 76 0 66 0 82   EGFR ml/min/1 73sq m 85 96 77   CALCIUM mg/dL 8 4 8 6 8 6   MAGNESIUM mg/dL 2 0 1 9  --      Results from last 7 days   Lab Units 02/23/20  0504 02/22/20  0625 02/21/20 1942   GLUCOSE RANDOM mg/dL 96 106 115     Results from last 7 days   Lab Units 02/21/20 1942   CRP mg/L 1 8       Blood Cultures ordered on  2/23 - drawn and pending      Vital Signs:   Date/Time  Temp  Pulse  Resp  BP  MAP (mmHg)  SpO2  O2 Device  Patient Position - Orthostatic VS   02/23/20 0900  98 °F (36 7 °C)  51Abnormal   16  139/65  93  97 %  None (Room air)  Sitting   02/22/20 2334  98 °F (36 7 °C)  46Abnormal   18  128/63  91  98 %  None (Room air)  Lying   02/22/20 1535  98 2 °F (36 8 °C)    18  122/57  82  98 %  None (Room air)  Lying   02/22/20 0812  97 4 °F (36 3 °C)Abnormal   65  18  134/62  89  94 %  None (Room air)  Lying   02/21/20 2300  97 6 °F (36 4 °C)  52Abnormal   22  165/71  102  97 %  None (Room air)  Lying   02/21/20 2129  98 °F (36 7 °C)  54Abnormal   20  150/98  118  94 %  None (Room air)  Sitting           Medications:   Scheduled Medications:    Medications:  acetaminophen 650 mg Oral Q6H NILA   doxycycline 100 mg Intravenous Q12H   enoxaparin 40 mg Subcutaneous Q24H [facility]   nicotine 1 patch Transdermal Daily   vancomycin 15 mg/kg Intravenous Q12H     Continuous IV Infusions:     PRN Meds:    ondansetron 4 mg Intravenous Q6H PRN 2/22 x 2    oxyCODONE 5 mg Oral Q4H PRN 2/22 x 2    oxyCODONE 10mg Oral Q4H PRN 2/22 x 1    Dilaudid  1 mg  Intravenous Q3H PRN 2/21 x 1  2/22 x 1 Discharge Plan: D     Network Utilization Review Department  [email or web address]  ATTENTION: Please call with any questions or concerns to [phone] and carefully listen to the prompts so that you are directed to the right person  All voicemails are confidential   Pilar Anaya all requests for admission clinical reviews, approved or denied determinations and any other requests to dedicated fax number below belonging to the campus where the patient is receiving treatment   List of dedicated fax numbers for the Facilities:  [address] DENIALS (Administrative/Medical Necessity) [phone]   [address] (Maternity/NICU/Pediatrics) [phone]   Violeta Bronson [phone]   [facility] [phone]   [place] [phone]   [address] [place] [address]  [phone]   [address] [place] [address] [phone]   [facility]  [phone]   [address] [facility] [address]  [address] [facility] [address] [facility] [phone]

## 2020-02-23 NOTE — PROGRESS NOTES
STEPHENIE Bryan  Attending, Orthopaedic Surgery  Foot and Ankle  [facility]      ORTHOPAEDIC PROGRESS NOTE     Assessment:   Right hand swelling and pain         Plan:   · The patient verbalized understanding of exam findings and treatment plan  We engaged in the shared decision-making process and treatment options were discussed at length with the patient  Surgical and conservative management discussed today along with risks and benefits  · She has continued to improve on antibiotics  This suggests possible infectious cause with the high WBC although patient has a normal CRP, no redness  · Could also perform a rheumatoid workup if indicated to rule out inflammatory cause of her flares  · Can consider ID consult for this unique case  · She was transitioned to doxycycline overnight  If this continues to improve can consider discharge on this medication  · I encouraged finger and wrist range of motion to prevent stiffness  · Should be see in 7-10 days in follow up      History of Present Illness:   Chief Complaint:   Chief Complaint   Patient presents with    Wrist Injury     Patient states she has had right thub and wrist pain since yesterday, hurts to move, denies fall or injury      Nilda Romo is a 62 y o  female who is being seen in follow-up for right hand swelling and pain  Pain has improved  Residual pain is localized at volar wrist with minimal radiating and described as dull and achy        Pain/symptom timing:  Worse during the day when active  Pain/symptom context:  Worse with activites and work  Pain/symptom modifying factors:  Rest makes better, activities make worse  Pain/symptom associated signs/symptoms: none    Prior treatment   · NSAIDsYes   · Injections No   · Bracing/Orthotics No    · Physical Therapy No     Orthopedic Surgical History:   Right shoulder I+D    Past Medical, Surgical and Social History:  Past Medical History:  has a past medical history of No known health problems and Septic arthritis ([facility] [address] )  Problem List: does not have any pertinent problems on file  Past Surgical History:  has a past surgical history that includes Hysterectomy and Shoulder arthroscopy (Left, 12/3/2019)  Family History: family history is not on file  Social History:  reports that she has been smoking  She has been smoking about 0 50 packs per day  She has never used smokeless tobacco  She reports that she drank alcohol  She reports that she does not use drugs  Current Medications:   Scheduled Meds:  Current Facility-Administered Medications:  acetaminophen 650 mg Oral Q6H [facility] Emanuel Al, DO    doxycycline 100 mg Intravenous Q12H [employer], DO Last Rate: Stopped (02/22/20 5488)   enoxaparin 40 mg Subcutaneous Q24H [facility] Mari Clements, SADIQ    nicotine 1 patch Transdermal Daily SADIQ Lobo    ondansetron 4 mg Intravenous Q6H PRN [employer], CRNP    oxyCODONE 2 5 mg Oral Q4H PRN Mikey Moffat, RELLNP    oxyCODONE 5 mg Oral Q4H PRN Nerisia Martha, RELLNP    vancomycin 15 mg/kg Intravenous Q12H SADIQ Lobo Last Rate: 750 mg (02/23/20 0345)     Continuous Infusions:   PRN Meds: ondansetron    oxyCODONE    oxyCODONE    Allergies: is allergic to iodinated diagnostic agents and penicillins       Review of Systems:  General- denies fever/chills  HEENT- denies hearing loss or sore throat  Eyes- denies eye pain or visual disturbances, denies red eyes  Respiratory- denies cough or SOB  Cardio- denies chest pain or palpitations  GI- denies abdominal pain  Endocrine- denies urinary frequency  Urinary- denies pain with urination  Musculoskeletal- Negative except noted above  Skin- denies rashes or wounds  Neurological- denies dizziness or headache  Psychiatric- denies anxiety or difficulty concentrating    Physical Exam:   /63 (BP Location: Left arm)   Pulse (!) 46   Temp 98 °F (36 7 °C) (Oral)   Resp 18   Ht 5' 8" (1 057 m)   Wt 54 7 kg (120 lb 9 6 oz)   SpO2 98%   BMI 18 34 kg/m²   General/Constitutional: No apparent distress: well-nourished and well developed  Eyes: normal ocular motion  Lymphatic: No appreciable lymphadenopathy  Respiratory: Non-labored breathing  Vascular: No edema, swelling or tenderness, except as noted in detailed exam   Integumentary: No impressive skin lesions present, except as noted in detailed exam   Neuro: No ataxia or tremors noted  Psych: Normal mood and affect, oriented to person, place and time  Appropriate affect  Musculoskeletal: Normal, except as noted in detailed exam and in HPI  Examination    Right    Right Wrist Exam    Negative thenar atrophy  Negative phalen's test  Negative carpal tunnel compression  Negative tinels over median nerve at the wrist   Opposition strength 5/5  Abduction strength 5/5       2 point discrimination is 4 mm throughout  Full ROM but painful at the wrist    No cellulitis    +Swelling                                                                Imaging Studies:   No new imaging        Juanna Bastos Lachman, MD  Foot & Ankle Surgery   Department of [address] [place] [address]      I personally performed the service  Juanna Bastos Lachman, MD

## 2020-02-24 ENCOUNTER — APPOINTMENT (INPATIENT)
Dept: MRI IMAGING | Facility: HOSPITAL | Age: 60
DRG: 351 | End: 2020-02-24
Payer: COMMERCIAL

## 2020-02-24 VITALS
HEIGHT: 68 IN | TEMPERATURE: 97.5 F | WEIGHT: 120.6 LBS | SYSTOLIC BLOOD PRESSURE: 149 MMHG | BODY MASS INDEX: 18.28 KG/M2 | RESPIRATION RATE: 18 BRPM | DIASTOLIC BLOOD PRESSURE: 96 MMHG | OXYGEN SATURATION: 97 % | HEART RATE: 55 BPM

## 2020-02-24 LAB
ANION GAP SERPL CALCULATED.3IONS-SCNC: 8 MMOL/L (ref 4–13)
BASOPHILS # BLD AUTO: 0.01 THOUSANDS/ΜL (ref 0–0.1)
BASOPHILS NFR BLD AUTO: 0 % (ref 0–1)
BUN SERPL-MCNC: 11 MG/DL (ref 5–25)
CALCIUM SERPL-MCNC: 8.5 MG/DL (ref 8.3–10.1)
CHLORIDE SERPL-SCNC: 106 MMOL/L (ref 100–108)
CO2 SERPL-SCNC: 27 MMOL/L (ref 21–32)
CREAT SERPL-MCNC: 0.61 MG/DL (ref 0.6–1.3)
EOSINOPHIL # BLD AUTO: 0 THOUSAND/ΜL (ref 0–0.61)
EOSINOPHIL NFR BLD AUTO: 0 % (ref 0–6)
ERYTHROCYTE [DISTWIDTH] IN BLOOD BY AUTOMATED COUNT: 11.9 % (ref 11.6–15.1)
GFR SERPL CREATININE-BSD FRML MDRD: 98 ML/MIN/1.73SQ M
GLUCOSE SERPL-MCNC: 100 MG/DL (ref 65–140)
HCT VFR BLD AUTO: 40.3 % (ref 34.8–46.1)
HGB BLD-MCNC: 13.7 G/DL (ref 11.5–15.4)
IMM GRANULOCYTES # BLD AUTO: 0.04 THOUSAND/UL (ref 0–0.2)
IMM GRANULOCYTES NFR BLD AUTO: 1 % (ref 0–2)
LYMPHOCYTES # BLD AUTO: 0.94 THOUSANDS/ΜL (ref 0.6–4.47)
LYMPHOCYTES NFR BLD AUTO: 13 % (ref 14–44)
MCH RBC QN AUTO: 30.1 PG (ref 26.8–34.3)
MCHC RBC AUTO-ENTMCNC: 34 G/DL (ref 31.4–37.4)
MCV RBC AUTO: 89 FL (ref 82–98)
MONOCYTES # BLD AUTO: 0.5 THOUSAND/ΜL (ref 0.17–1.22)
MONOCYTES NFR BLD AUTO: 7 % (ref 4–12)
NEUTROPHILS # BLD AUTO: 5.53 THOUSANDS/ΜL (ref 1.85–7.62)
NEUTS SEG NFR BLD AUTO: 79 % (ref 43–75)
NRBC BLD AUTO-RTO: 0 /100 WBCS
PLATELET # BLD AUTO: 222 THOUSANDS/UL (ref 149–390)
PMV BLD AUTO: 10.5 FL (ref 8.9–12.7)
POTASSIUM SERPL-SCNC: 4 MMOL/L (ref 3.5–5.3)
RBC # BLD AUTO: 4.55 MILLION/UL (ref 3.81–5.12)
RHEUMATOID FACT SER QL LA: NEGATIVE
RPR SER QL: NORMAL
RYE IGE QN: NEGATIVE
SODIUM SERPL-SCNC: 141 MMOL/L (ref 136–145)
WBC # BLD AUTO: 7.02 THOUSAND/UL (ref 4.31–10.16)

## 2020-02-24 PROCEDURE — 99239 HOSP IP/OBS DSCHRG MGMT >30: CPT | Performed by: INTERNAL MEDICINE

## 2020-02-24 PROCEDURE — 80048 BASIC METABOLIC PNL TOTAL CA: CPT | Performed by: FAMILY MEDICINE

## 2020-02-24 PROCEDURE — 85025 COMPLETE CBC W/AUTO DIFF WBC: CPT | Performed by: FAMILY MEDICINE

## 2020-02-24 PROCEDURE — 73221 MRI JOINT UPR EXTREM W/O DYE: CPT

## 2020-02-24 RX ORDER — METHYLPREDNISOLONE SODIUM SUCCINATE 40 MG/ML
40 INJECTION, POWDER, LYOPHILIZED, FOR SOLUTION INTRAMUSCULAR; INTRAVENOUS ONCE
Status: DISCONTINUED | OUTPATIENT
Start: 2020-02-24 | End: 2020-02-24

## 2020-02-24 RX ORDER — PREDNISONE 20 MG/1
20 TABLET ORAL DAILY
Qty: 2 TABLET | Refills: 0 | Status: SHIPPED | OUTPATIENT
Start: 2020-02-25 | End: 2020-02-27

## 2020-02-24 RX ORDER — PREDNISONE 20 MG/1
20 TABLET ORAL DAILY
Status: DISCONTINUED | OUTPATIENT
Start: 2020-02-24 | End: 2020-02-24 | Stop reason: HOSPADM

## 2020-02-24 RX ORDER — METHYLPREDNISOLONE SODIUM SUCCINATE 40 MG/ML
40 INJECTION, POWDER, LYOPHILIZED, FOR SOLUTION INTRAMUSCULAR; INTRAVENOUS DAILY
Status: DISCONTINUED | OUTPATIENT
Start: 2020-02-24 | End: 2020-02-24

## 2020-02-24 RX ADMIN — METHYLPREDNISOLONE SODIUM SUCCINATE 40 MG: 40 INJECTION, POWDER, FOR SOLUTION INTRAMUSCULAR; INTRAVENOUS at 08:13

## 2020-02-24 RX ADMIN — ENOXAPARIN SODIUM 40 MG: 40 INJECTION SUBCUTANEOUS at 08:13

## 2020-02-24 RX ADMIN — ACETAMINOPHEN 650 MG: 325 TABLET ORAL at 08:13

## 2020-02-24 RX ADMIN — ACETAMINOPHEN 650 MG: 325 TABLET ORAL at 15:25

## 2020-02-24 RX ADMIN — NICOTINE 1 PATCH: 14 PATCH TRANSDERMAL at 08:13

## 2020-02-24 NOTE — PROGRESS NOTES
Pastoral Care Progress Note    2020  Patient: Veda Matos : 1960  Admission Date & Time: 2020 1849  MRN: [id number] [facility]: [id number]                     Chaplaincy Interventions Utilized:   Empowerment: Provided anxiety containment and Provided chaplaincy education    Exploration: Explored emotional needs & resources, Explored relational needs & resources and Explored spiritual needs & resources        Relationship Building: Cultivated a relationship of care and support, Listened empathically and Hospitality        Chaplaincy Outcomes Achieved:  Debriefed/defused experience and Expressed gratitude          Spiritual Coping Strategies Utilized:   No spiritual coping       20 590 [facility] Affiliation None   Stress Factors   Patient Stress Factors Financial concerns; Health changes   Family Stress Factors Financial concerns   Coping Responses   Patient Coping Accepting;Open/discussion   Family Coping   (Did not engage in conversation )   Plan of Care   Assessment Completed by: Unit visit

## 2020-02-24 NOTE — PROGRESS NOTES
Pleasant woman  Spouse at bedside  Oriented both to pastoral care  Pt voiced most concern about financial difficulties in possibly missing work  No reported needs for spiritual or emotional support  02/24/20 590 [facility] Affiliation None   Stress Factors   Patient Stress Factors Financial concerns; Health changes   Family Stress Factors Financial concerns   Coping Responses   Patient Coping Accepting;Open/discussion   Family Coping   (Did not engage in conversation )   Plan of Care   Assessment Completed by: Unit visit

## 2020-02-24 NOTE — PROGRESS NOTES
Progress Note - Infectious Disease   Erica Mcgrath 62 y o  female MRN: [id number]  Unit/Bed#: -01 Encounter: [id number]    Assessment     Acute on chronic right wrist pain differential diagnosis includes septic arthritis osteomyelitis rheumatological manifestation / synovitis  -blood cultures on admission negative  -Lyme titers negative  -orthopedic surgery currently following  -white blood cell count on admission 11k /afebrile  -CRP 1 8  -X ray of the right wrist negative     History of left shoulder septic arthritis  -135,000 WBCs from synovial fluid analysis with 96% being neutrophils  -status post completion of 4-6 wks of treatment 12/2019        Plan     Pending anti CCP, rheumatoid factor, and MARK, RPR, acute hepatitis panel, and ESR   & noncontrast MRI of the right wrist as well   Hospital Medicine started the patient on steroids to help with inflammatory response  Patient is afebrile CBC with differential pending    From a clinical standpoint, her wrist pain is a lot better today  If the MRI of the wrist is negative, I recommend discharging the patient on an oral steroid taper  At the discretion of hospital Medicine  I will follow the patient's serologies and labs as outpatient      Ailyn Vidal DO  Infectious Disease  [facility]   Cell [phone]      Subjective/Objective       Subjective:     no fevers overnight reported / wrist pain better today     REVIEW OF SYSTEMS  GENERAL/CONSTITUTIONAL: The patient denies fever, fatigue, weakness, weight gain or weight loss    HEAD, EYES, EARS, NOSE AND THROAT: Eyes  The patient denies pain, redness, loss of vision, double or blurred vision, flashing lights or spots, dryness, sore throats, and hoarseness   CARDIOVASCULAR: The patient denies chest pain, irregular heartbeats, sudden changes in heartbeat or palpitation, shortness of breath, difficulty breathing at night, swollen legs RESPIRATORY: The patient denies chronic dry cough, coughing up blood, coughing up mucus and wheezing  GASTROINTESTINAL: The patient denies decreased appetite, nausea, vomiting, vomiting blood or coffee ground material, heartburn  GENITOURINARY: The patient denies difficult urination, pain or burning with urination, blood in the urine, cloudy or smoky urine, frequent need to urinate  MUSCULOSKELETAL: The patient denies arm, buttock, thigh or calf cramps  No joint or muscle pain  No muscle weakness or tenderness  No joint swelling, neck pain, back pain or major orthopedic injuries  SKIN AND BREASTS: The patient denies easy bruising, skin redness, skin rash, hives, sensitivity to sun exposure, tightness, nodules or bumps, hair loss, color changes in the hands or feet with cold, breast lump, breast pain or nipple discharge  NEUROLOGIC: The patient denies headache, dizziness, fainting, muscle spasm, loss of consciousness, sensitivity or pain in the hands and feet or memory loss  PSYCHIATRIC: The patient denies depression with thoughts of suicide, voices in ?? head telling ? ? to do things and has not been seen for psychiatric counseling or treatment  ENDOCRINE: The patient denies intolerance to hot or cold temperature, flushing, fingernail changes, increased thirst, increased salt intake or decreased sexual desire  HEMATOLOGIC/LYMPHATIC: The patient denies anemia, bleeding tendency or clotting tendency  ALLERGIC/IMMUNOLOGIC: The patient denies rhinitis, asthma, skin sensitivity, latex allergies or sensitivity  Objective:     Temp:  [97 5 °F (36 4 °C)-98 4 °F (36 9 °C)] 97 5 °F (36 4 °C)  HR:  [46-65] 55  Resp:  [16-18] 18  BP: (138-154)/(65-96) 149/96  SpO2:  [96 %-97 %] 97 %  Temp (24hrs), Av 9 °F (36 6 °C), Min:97 5 °F (36 4 °C), Max:98 4 °F (36 9 °C)  Current: Temperature: 97 5 °F (36 4 °C)    Physical Exam:     GENERAL APPEARANCE: Well developed, well nourished, in no acute distress    SKIN: Inspection of the skin reveals no rashes, ulcerations or petechiae  HEENT: The sclerae were anicteric and conjunctivae were pink and moist  Extraocular movements were intact and pupils were equal, round, and reactive to light with normal accommodation  NECK: Supple and symmetric  There was no thyroid enlargement, and no tenderness, or masses were felt  CHEST: Normal AP diameter and normal contour without any kyphoscoliosis  LUNGS: Auscultation of the lungs revealed normal breath sounds without any other adventitious sounds or rubs  CARDIOVASCULAR: There was a regular rate and rhythm without any murmurs, gallops, rubs  The carotid pulses were normal and 2+ bilaterally without bruits  ABDOMEN: Soft and nontender with normal bowel sounds  No ascites was noted  LYMPH NODES: No lymphadenopathy was appreciated in the neck, axillae or groin  EXTREMITIES:  Pain and swelling located on the right wrist no cellulitic changes no open lesion  NEUROLOGIC: Alert and oriented x 3  Normal affect  Invasive Devices     Peripheral Intravenous Line            Peripheral IV 02/23/20 Left Forearm less than 1 day                    Labs:  CBC w/diff  Recent Labs     02/24/20  0516   WBC 7 02   HGB 13 7   HCT 40 3      NEUTOPHILPCT 79*   LYMPHOPCT 13*   MONOPCT 7   EOSPCT 0     BMP  Recent Labs     02/24/20  0516   K 4 0      CO2 27   BUN 11   CREATININE 0 61   CALCIUM 8 5     CMP  Recent Labs     02/24/20  0516   K 4 0      CO2 27   BUN 11   CREATININE 0 61   CALCIUM 8 5        labrc    Cultures:  Lab Results   Component Value Date    BLOODCX Received in Microbiology Lab  Culture in Progress  02/23/2020    BLOODCX Received in Microbiology Lab  Culture in Progress  02/23/2020    BLOODCX No Growth After 5 Days  12/03/2019    BLOODCX No Growth After 5 Days   12/03/2019     No results found for: WOUNDCULT  No results found for: URINECX  No results found for: SPUTUMCULTUR    MED:      Current Facility-Administered Medications:     acetaminophen (TYLENOL) tablet 650 mg, 650 mg, Oral, Q6H [address], Mahi Al DO, Stopped at 02/24/20 0543    enoxaparin (LOVENOX) subcutaneous injection 40 mg, 40 mg, Subcutaneous, Q24H [address], Mari Clements, CRNP, 40 mg at 02/23/20 0837    nicotine (NICODERM CQ) 14 mg/24hr TD 24 hr patch 1 patch, 1 patch, Transdermal, Daily, SADIQ Lobo, 1 patch at 02/23/20 1511    ondansetron (ZOFRAN) injection 4 mg, 4 mg, Intravenous, Q6H PRN, [employer], SADIQ, 4 mg at 02/22/20 1811    oxyCODONE (ROXICODONE) IR tablet 2 5 mg, 2 5 mg, Oral, Q4H PRN, SADIQ Roberts    oxyCODONE (ROXICODONE) IR tablet 5 mg, 5 mg, Oral, Q4H PRN, SADIQ Roberts    predniSONE tablet 20 mg, 20 mg, Oral, Daily, SADIQ Roberts    Principal Problem:    Wrist pain, acute, right  Active Problems:    Leukocytosis    Tobacco use      Adeel Mcdonald DO

## 2020-02-24 NOTE — DISCHARGE SUMMARY
Discharge- Erica Mcgrath 1960, 62 y o  female MRN: [id number]    Unit/Bed#: -01 Encounter: [id number]    Primary Care Provider: Joye Apley, MD   Date and time admitted to hospital: 2/21/2020  6:49 PM        * Wrist pain, acute, right  Assessment & Plan  Patient developed acute right wrist pain with signs of inflammation that required hospital admission  She had mild leukocytosis on admission  Her blood culture showed no growth, ESR, CRP and procalcitonin levels were normal     She was seen in consultation by Orthopedics and Infectious Disease  Infectious disease felt that this was not infectious arthritis  Patient was started on steroids that resulted in marked improvement in her wrist pain and swelling  Patient had MRI of the right wrist that was abnormal   I discussed the case with Dr Teretha Cheadle who felt that patient should follow up with Ortho as an outpatient in 1 week  Patient had minimal tenderness physical examination on the palmar aspect of the right wrist which was much improved compared to admission she claimed  I discharged her on prednisone 20 mg daily for 2 more days with instructions to follow up with Ortho as an outpatient in 1 week              Hospital Course:     Erica Mcgrath is a 62 y o  female patient who originally presented to the hospital on   Admission Orders (From admission, onward)     Ordered        02/23/20 1105  Inpatient Admission  Once         02/21/20 2108  Place in Observation  Once                  due to right wrist pain and swelling    Please see above list of diagnoses and related plan for additional information  Condition at Discharge:  good      Discharge instructions/Information to patient and family:   See after visit summary for information provided to patient and family  Provisions for Follow-Up Care:  See after visit summary for information related to follow-up care and any pertinent home health orders  Disposition:     Home       Discharge Statement:  I spent 32 minutes discharging the patient  This time was spent on the day of discharge  I had direct contact with the patient on the day of discharge  Greater than 50% of the total time was spent examining patient, answering all patient questions, arranging and discussing plan of care with patient as well as directly providing post-discharge instructions  Additional time then spent on discharge activities  Discharge Medications:  See after visit summary for reconciled discharge medications provided to patient and family        ** Please Note: This note has been constructed using a voice recognition system **

## 2020-02-24 NOTE — PROGRESS NOTES
Progress Note - Jose Salinas 1960, 62 y o  female MRN: [id number]    Unit/Bed#: -01 Encounter: [id number]    Primary Care Provider: Monika Freedman MD   Date and time admitted to hospital: 2020  6:49 PM        * Wrist pain, acute, right  Assessment & Plan  Patient developed acute right wrist pain with signs of inflammation that required hospital admission  She had mild leukocytosis on admission  Her blood culture showed no growth, ESR, CRP and procalcitonin levels were normal     She was seen in consultation by Orthopedics and Infectious Disease  Infectious disease felt that this was not infectious arthritis  Patient was started on steroids that resulted in marked improvement in her wrist pain and swelling  This morning she only has mild wrist pain when she flexes her wrist otherwise she is able to move her wrist in all directions without pain or tenderness on physical examination    If MRI of the risk and be done today will await results of the why she would be discharged today      VTE Prophylaxis: in place    Patient Centered Rounds: I rounded with patient's nurse    Current Length of Stay: 1 day(s)    Current Patient Status: Inpatient    Certification Statement: Pt requires additional inpatient hospital stay due to: see assessment and plan        Subjective:   Patient's right wrist swelling resolved and right wrist pain is markedly improved compared to admission  Denies any right hand weakness or numbness    All other ROS are negative    Objective:     Vitals:   Temp (24hrs), Av 8 °F (36 6 °C), Min:97 5 °F (36 4 °C), Max:98 4 °F (36 9 °C)    Temp:  [97 5 °F (36 4 °C)-98 4 °F (36 9 °C)] 97 5 °F (36 4 °C)  HR:  [46-65] 55  Resp:  [16-18] 18  BP: (138-154)/(65-96) 149/96  SpO2:  [96 %-97 %] 97 %  Body mass index is 18 34 kg/m²  Input and Output Summary (last 24 hours):        Intake/Output Summary (Last 24 hours) at 2020 1029  Last data filed at 2020 1721  Gross per 24 hour   Intake 420 ml   Output    Net 420 ml       Physical Exam:     Physical Exam   Constitutional: She is oriented to person, place, and time  She appears well-developed  HENT:   Head: Normocephalic  Cardiovascular: Normal rate and regular rhythm  No murmur heard  Pulmonary/Chest: Effort normal and breath sounds normal    Musculoskeletal:   Patient has no right wrist synovitis, effusion, warmth, tenderness  She has full range of motion of right wrist   Radial and ulnar pulses are normal on the right   Neurological: She is alert and oriented to person, place, and time  Right  strength is normal, sensation to light touch is normal   Skin: Skin is warm and dry  I personally reviewed labs and imaging reports for today  Last 24 Hours Medication List:     Current Facility-Administered Medications:  acetaminophen 650 mg Oral Q6H [address] Emanuel Al DO   enoxaparin 40 mg Subcutaneous Q24H [address] SADIQ Araiza   nicotine 1 patch Transdermal Daily SADIQ Lobo   ondansetron 4 mg Intravenous Q6H PRN SADIQ Bean   predniSONE 20 mg Oral Daily Lupe Flores MD          Today, Patient Was Seen By: Lupe Flores MD    ** Please Note: Dictation voice to text software may have been used in the creation of this document   **

## 2020-02-24 NOTE — ASSESSMENT & PLAN NOTE
Patient developed acute right wrist pain with signs of inflammation that required hospital admission  She had mild leukocytosis on admission  Her blood culture showed no growth, ESR, CRP and procalcitonin levels were normal     She was seen in consultation by Orthopedics and Infectious Disease  Infectious disease felt that this was not infectious arthritis  Patient was started on steroids that resulted in marked improvement in her wrist pain and swelling      This morning she only has mild wrist pain when she flexes her wrist otherwise she is able to move her wrist in all directions without pain or tenderness on physical examination    If MRI of the risk and be done today will await results of the why she would be discharged today

## 2020-02-25 ENCOUNTER — TRANSITIONAL CARE MANAGEMENT (OUTPATIENT)
Dept: FAMILY MEDICINE CLINIC | Facility: CLINIC | Age: 60
End: 2020-02-25

## 2020-02-25 ENCOUNTER — TELEPHONE (OUTPATIENT)
Dept: OBGYN CLINIC | Facility: HOSPITAL | Age: 60
End: 2020-02-25

## 2020-02-25 NOTE — TELEPHONE ENCOUNTER
Patient needs an appt to see Dr Homer Petty in [place] for [address] appt for R Wrist  Pain  When would you like to see her?

## 2020-02-26 LAB — CCP IGA+IGG SERPL IA-ACNC: 41 UNITS (ref 0–19)

## 2020-02-26 NOTE — TELEPHONE ENCOUNTER
I spoke with patient and she does not have insurance coverage yet  She is working on this  The wrist will be evaluated by PCP that patient was given thru the [facility] program   They will call back to schedule with Dr Milly Crockett when they have get referral from PCP she was given thru [facility] program   She will be meeting with financial councelor to set things up

## 2020-02-27 ENCOUNTER — OFFICE VISIT (OUTPATIENT)
Dept: FAMILY MEDICINE CLINIC | Facility: CLINIC | Age: 60
End: 2020-02-27

## 2020-02-27 VITALS
DIASTOLIC BLOOD PRESSURE: 88 MMHG | OXYGEN SATURATION: 98 % | HEART RATE: 68 BPM | RESPIRATION RATE: 18 BRPM | SYSTOLIC BLOOD PRESSURE: 140 MMHG | WEIGHT: 120 LBS | BODY MASS INDEX: 18.83 KG/M2 | HEIGHT: 67 IN | TEMPERATURE: 97.8 F

## 2020-02-27 DIAGNOSIS — Z78.9 NEED FOR FOLLOW-UP BY SOCIAL WORKER: ICD-10-CM

## 2020-02-27 DIAGNOSIS — R76.8 CYCLIC CITRULLINATED PEPTIDE (CCP) ANTIBODY POSITIVE: ICD-10-CM

## 2020-02-27 DIAGNOSIS — M25.531 WRIST PAIN, ACUTE, RIGHT: Primary | ICD-10-CM

## 2020-02-27 PROCEDURE — 99495 TRANSJ CARE MGMT MOD F2F 14D: CPT | Performed by: NURSE PRACTITIONER

## 2020-02-27 NOTE — PATIENT INSTRUCTIONS
Arthralgia   WHAT YOU NEED TO KNOW:   Arthralgia is pain in one or more joints, with no inflammation  It may be short-term and get better within 6 to 8 weeks  Arthralgia can be an early sign of arthritis  Arthralgia may be caused by a medical condition, such as a hormone disorder or a tumor  It may also be caused by an infection or injury  DISCHARGE INSTRUCTIONS:   Medicines: The following medicines may  be ordered for you:  · Acetaminophen  decreases pain  Ask how much to take and how often to take it  Follow directions  Acetaminophen can cause liver damage if not taken correctly  · NSAIDs  decrease pain and prevent swelling  Ask your healthcare provider which medicine is right for you  Ask how much to take and when to take it  Take as directed  NSAIDs can cause stomach bleeding and kidney problems if not taken correctly  · Pain relief cream  decreases pain  Use this cream as directed  · Take your medicine as directed  Contact your healthcare provider if you think your medicine is not helping or if you have side effects  Tell him of her if you are allergic to any medicine  Keep a list of the medicines, vitamins, and herbs you take  Include the amounts, and when and why you take them  Bring the list or the pill bottles to follow-up visits  Carry your medicine list with you in case of an emergency  Follow up with your healthcare provider or specialist as directed:  Write down your questions so you remember to ask them during your visits  Self-care:   · Apply heat  to help decrease pain  Use a heating pad or heat wrap  Apply heat for 20 to 30 minutes every 2 hours for as many days as directed  · Rest  as much as possible  Avoid activities that cause joint pain  · Apply ice  to help decrease swelling and pain  Ice may also help prevent tissue damage  Use an ice pack, or put crushed ice in a plastic bag   Cover it with a towel and place it on your painful joint for 15 to 20 minutes every hour or as directed  · Support  the joint with a brace or elastic wrap as directed  · Elevate  your joint above the level of your heart as often as you can to help decrease swelling and pain  Prop your painful joint on pillows or blankets to keep it elevated comfortably  · Lose weight  if you are overweight  Extra weight can put pressure on your joints and cause more pain  Ask your healthcare provider how much you should weigh  Ask him to help you create a weight loss plan  · Exercise  regularly to help improve joint movement and to decrease pain  Ask about the best exercise plan for you  Low-impact exercises can help take the pressure off your joints  Examples are walking, swimming, and water aerobics  Physical therapy:  A physical therapist teaches you exercises to help improve movement and strength, and to decrease pain  Ask your healthcare provider if physical therapy is right for you  Contact your healthcare provider or specialist if:   · You have a fever  · You continue to have joint pain that cannot be relieved with heat, ice, or medicine  · You have pain and inflammation around your joint  · You have questions or concerns about your condition or care  Return to the emergency department if:   · You have sudden, severe pain when you move your joint  · You have a fever and shaking chills  · You cannot move your joint  · You lose feeling on the side of your body where you have the painful joint  © 2017 [employer]  Information is for End User's use only and may not be sold, redistributed or otherwise used for commercial purposes  All illustrations and images included in CareNotes® are the copyrighted property of A D A M , Inc  or Fredy Medeiros  The above information is an  only  It is not intended as medical advice for individual conditions or treatments   Talk to your doctor, nurse or pharmacist before following any medical regimen to see if it is safe and effective for you

## 2020-02-28 LAB
BACTERIA BLD CULT: NORMAL
BACTERIA BLD CULT: NORMAL

## 2020-02-28 RX ORDER — LIDOCAINE 50 MG/G
OINTMENT TOPICAL AS NEEDED
Qty: 35.44 G | Refills: 0 | Status: SHIPPED | OUTPATIENT
Start: 2020-02-28 | End: 2020-09-01 | Stop reason: ALTCHOICE

## 2020-02-28 NOTE — PROGRESS NOTES
Transition of Care  Follow-up After Hospitalization    Raghu Gallagher 62 y o  female   Date:  2/28/2020    TCM Call (since 1/28/2020)     Date and time call was made  2/25/2020 10:32 AM    Hospital care reviewed  Records reviewed    Patient was hospitialized at  [facility]    Date of Admission  02/21/20    Date of discharge  02/24/20    Diagnosis  Wrist pain, acute, right    Disposition  Home    Were the patients medications reviewed and updated  Yes    Current Symptoms  None      TCM Call (since 1/28/2020)     Post hospital issues  None    Should patient be enrolled in anticoag monitoring? No    Scheduled for follow up? Yes    Did you obtain your prescribed medications  Yes    Do you need help managing your prescriptions or medications  No    Is transportation to your appointment needed  No    I have advised the patient to call PCP with any new or worsening symptoms  Una Parsons MA     Living Arrangements  Spouse or Significiant other    Support System  Spouse    The type of support provided  Emotional; Financial; Physical    Do you have social support  Yes, as much as I need    Are you recieving any outpatient services  No    Are you recieving home care services  No    Are you using any community resources  No    Current waiver services  No    Have you fallen in the last 12 months  No    Interperter language line needed  No    Counseling  Patient        Hospital records were reviewed  Medications upon discharge reviewed/updated  HPI:    The patient presents today for hospital follow-up  The patient was hospitalized from 2/21/2020-2/24/2020 due to acute right wrist pain with signs of inflammation  The patient was seen by orthopedics and Infectious Disease  ID felt that this was not infectious arthritis  They started the patient on steroids which improved her pain and edema    The patient also had an MRI performed on the right wrist which was abnormal   It was recommended that she follow-up with orthopedics as an outpatient however the patient does not have insurance  The patient is currently working with financial counselors and [id number]  She reports that she has been trying to obtain insurance for over 2 months now with no results  She received the patient advocate information from the financial counselor today  She reports that she will follow-up with the advocate and then contact the office to update  Of note the patient developed left shoulder septic arthritis in December requiring IV antibiotics  She reports that she has been having issues of this nature for about 10 years  But due to lack of insurance has not been able to have a full workup or comprehensive Care  She is also behind on screenings and has never had a mammogram or colonoscopy  She is a smoker for about 45 years, she does not plan on quitting  She denies any illicit drug or alcohol use  There is no significant family history  ROS: Review of Systems   Constitutional: Negative for activity change, appetite change, chills, fatigue, fever and unexpected weight change  HENT: Negative for hearing loss, nosebleeds, sinus pain, sneezing, sore throat and trouble swallowing  Eyes: Negative for photophobia and visual disturbance  Respiratory: Negative for cough, chest tightness, shortness of breath and wheezing  Cardiovascular: Negative for chest pain, palpitations and leg swelling  Gastrointestinal: Negative for abdominal pain, constipation, nausea and vomiting  Genitourinary: Negative for decreased urine volume, difficulty urinating, dysuria, flank pain, hematuria and urgency  Musculoskeletal: Positive for arthralgias  Negative for back pain, gait problem and joint swelling  Skin: Negative for pallor, rash and wound  Neurological: Negative for dizziness, seizures, syncope, weakness, numbness and headaches  Hematological: Negative for adenopathy  Does not bruise/bleed easily  Psychiatric/Behavioral: Negative for confusion, hallucinations, self-injury, sleep disturbance and suicidal ideas  The patient is not nervous/anxious  Past Medical History:   Diagnosis Date    No known health problems     Septic arthritis ([address] )        Past Surgical History:   Procedure Laterality Date    HYSTERECTOMY      SHOULDER ARTHROSCOPY Left 12/3/2019    Procedure: ARTHROSCOPY SHOULDER;  Surgeon: Reid Masterson MD;  Location: [facility] OR;  Service: Orthopedics       Social History     Socioeconomic History    Marital status: /Civil Union     Spouse name: None    Number of children: None    Years of education: None    Highest education level: None   Occupational History    None   Social Needs    Financial resource strain: None    Food insecurity:     Worry: None     Inability: None    Transportation needs:     Medical: None     Non-medical: None   Tobacco Use    Smoking status: Current Every Day Smoker     Packs/day: 0 50    Smokeless tobacco: Never Used    Tobacco comment: refused   Substance and Sexual Activity    Alcohol use: Not Currently    Drug use: No    Sexual activity: None   Lifestyle    Physical activity:     Days per week: None     Minutes per session: None    Stress: None   Relationships    Social connections:     Talks on phone: None     Gets together: None     Attends Taoist service: None     Active member of club or organization: None     Attends meetings of clubs or organizations: None     Relationship status: None    Intimate partner violence:     Fear of current or ex partner: None     Emotionally abused: None     Physically abused: None     Forced sexual activity: None   Other Topics Concern    None   Social History Narrative    None       No family history on file      Allergies   Allergen Reactions    Iodinated Diagnostic Agents Hives    Penicillins Hives         Current Outpatient Medications:     acetaminophen (TYLENOL) 325 mg tablet, Take 2 tablets (650 mg total) by mouth every 6 (six) hours, Disp: 30 tablet, Rfl: 0    lidocaine (XYLOCAINE) 5 % ointment, Apply topically as needed for mild pain, Disp: 35 44 g, Rfl: 0    nicotine (NICODERM CQ) 14 mg/24hr TD 24 hr patch, Place 1 patch on the skin daily (Patient not taking: Reported on 2/21/2020), Disp: 28 patch, Rfl: 0      Physical Exam:  /88   Pulse 68   Temp 97 8 °F (36 6 °C) (Skin)   Resp 18   Ht 5' 7" (1 702 m)   Wt 54 4 kg (120 lb)   SpO2 98%   BMI 18 79 kg/m²     Physical Exam   Constitutional: She is oriented to person, place, and time  She appears well-developed and well-nourished  She appears cachectic  No distress  HENT:   Head: Normocephalic and atraumatic  Right Ear: External ear normal    Left Ear: External ear normal    Nose: Nose normal    Mouth/Throat: No oropharyngeal exudate  Eyes: Pupils are equal, round, and reactive to light  Conjunctivae and EOM are normal  Right eye exhibits no discharge  Left eye exhibits no discharge  Neck: Normal range of motion  Neck supple  Cardiovascular: Normal rate, regular rhythm, normal heart sounds and intact distal pulses  Pulmonary/Chest: Effort normal and breath sounds normal  No respiratory distress  She has no wheezes  Abdominal: Soft  Bowel sounds are normal  She exhibits no distension  There is no tenderness  Musculoskeletal: Normal range of motion  Right wrist: She exhibits tenderness  She exhibits normal range of motion, no bony tenderness and no swelling  TTP to the central area of volar wrist     Lymphadenopathy:     She has no cervical adenopathy  Neurological: She is alert and oriented to person, place, and time  She displays normal reflexes  No cranial nerve deficit or sensory deficit  Coordination normal    Skin: Skin is warm and dry  Capillary refill takes less than 2 seconds  No rash noted  She is not diaphoretic  Psychiatric: She has a normal mood and affect   Her behavior is normal    Nursing note and vitals reviewed  Labs:  Lab Results   Component Value Date    WBC 7 02 02/24/2020    HGB 13 7 02/24/2020    HCT 40 3 02/24/2020    MCV 89 02/24/2020     02/24/2020     Lab Results   Component Value Date    K 4 0 02/24/2020     02/24/2020    CO2 27 02/24/2020    BUN 11 02/24/2020    CREATININE 0 61 02/24/2020    GLUF 96 02/23/2020    CALCIUM 8 5 02/24/2020    AST 17 12/16/2019    ALT 21 12/16/2019    ALKPHOS 102 12/16/2019    EGFR 98 02/24/2020       Assessment and Plan:      Cortney Jackson was seen today for transition of care management  Diagnoses and all orders for this visit:    Wrist pain, acute, right  -     Ambulatory referral to Orthopedic Surgery; Future  -     lidocaine (XYLOCAINE) 5 % ointment; Apply topically as needed for mild pain  -     Ambulatory referral to Rheumatology; Future    Need for follow-up by   -     Ambulatory referral to social work care management program; Future    Cyclic citrullinated peptide (CCP) antibody positive  -     Ambulatory referral to Rheumatology; Future        I would recommend that the patient follow up with orthopedics as well as rheumatology  She did have a positive CCP antibody in the hospital,  MARK and RF negative  Lyme and RPR were also negative  The patient can continue to take acetaminophen and naproxen for pain  I would also recommend that the patient have a mammogram, colonoscopy, and lung CT ( lung cancer screening) as soon as able once insurance is approved or recommend the patient contact her local health bureau to see if there are any free screening programs  Highly encouraged the patient to quit smoking, will continue to encourage cessation

## 2020-03-03 ENCOUNTER — PATIENT OUTREACH (OUTPATIENT)
Dept: FAMILY MEDICINE CLINIC | Facility: CLINIC | Age: 60
End: 2020-03-03

## 2020-03-03 NOTE — PROGRESS NOTES
MELVIN CASTRO referral from Benigno Mirza, [address]  Pt reports to provider that she is uninsured  Pt states that she began a MA application with [employer] while hospitalized  MELVIN CASTRO sent an email to Financial Counselors asking them to follow up with pt and [employer] to make sure that pt has followed through with necessary documentation for application processing  MELVIN CASTRO to continue to follow

## 2020-03-18 ENCOUNTER — PATIENT OUTREACH (OUTPATIENT)
Dept: FAMILY MEDICINE CLINIC | Facility: CLINIC | Age: 60
End: 2020-03-18

## 2020-03-18 NOTE — PROGRESS NOTES
MELVIN CASTRO spoke with Maris Lam, Financial Counselor, via e-mail  Tip left a voicemail for the patient regarding her MA application  Tip also spoke with Connor Oliveira at [facility], who states that the pt's MA application was denied, but no reason was indicated  Connor Oliveira advised Tip that BERENICE had placed a call to the  at [facility] and they were waiting for a call back  MELVIN CASTRO is closing referral at this time  If pt presents to office with same issue at a later date, please refer pt to Owen Quinonez office, as pt will need to work with Financial Counselor and BERENICE at this point to determine reasons for denied MA application in order to proceed  MELVIN CASTRO remains available for additional support and resources as needed via order
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
## 2020-07-22 ENCOUNTER — TELEMEDICINE (OUTPATIENT)
Dept: FAMILY MEDICINE CLINIC | Facility: CLINIC | Age: 60
End: 2020-07-22
Payer: COMMERCIAL

## 2020-07-22 DIAGNOSIS — Z20.828 EXPOSURE TO SARS-ASSOCIATED CORONAVIRUS: ICD-10-CM

## 2020-07-22 DIAGNOSIS — Z20.822 SUSPECTED COVID-19 VIRUS INFECTION: ICD-10-CM

## 2020-07-22 DIAGNOSIS — Z20.828 EXPOSURE TO SARS-ASSOCIATED CORONAVIRUS: Primary | ICD-10-CM

## 2020-07-22 PROCEDURE — 99213 OFFICE O/P EST LOW 20 MIN: CPT | Performed by: FAMILY MEDICINE

## 2020-07-22 PROCEDURE — U0003 INFECTIOUS AGENT DETECTION BY NUCLEIC ACID (DNA OR RNA); SEVERE ACUTE RESPIRATORY SYNDROME CORONAVIRUS 2 (SARS-COV-2) (CORONAVIRUS DISEASE [COVID-19]), AMPLIFIED PROBE TECHNIQUE, MAKING USE OF HIGH THROUGHPUT TECHNOLOGIES AS DESCRIBED BY CMS-2020-01-R: HCPCS

## 2020-07-22 NOTE — PATIENT INSTRUCTIONS
Will send patient to lab for testing  She will stay home from work and self isolate in the home  She is aware this may take 4-7 days to get testing back  No will be sent to her employer  Symptomatic treatment  Protocols were discussed  Patient will cough having worsening symptoms particular shortness of breath

## 2020-07-22 NOTE — LETTER
July 22, 2020     Patient: Jc Maier   YOB: 1960   Date of Visit: 7/22/2020       To Whom it May Concern:    Kathleen Rivera is under my professional care  She was seen in my office on 7/22/2020  She is being tested for COVID-19 infection  Symptom pattern does justify this  I do have a low suspicion  Her testing may take 4-7 days to return  She has been advised to stay out of work and self quaratine at home until we get the results of her test   When I get that test back, I will be re- interviewing her and give final advice upon appropriate return to work       If you have any questions or concerns, please don't hesitate to call           Sincerely,          Chrystal Frankel, MD        CC: No Recipients

## 2020-07-22 NOTE — PROGRESS NOTES
Virtual Regular Visit      Assessment/Plan:    Problem List Items Addressed This Visit     None      Visit Diagnoses     Exposure to SARS-associated coronavirus    -  Primary    Relevant Orders    MISC COVID-19 TEST- Collected at [facility]    Suspected Covid-19 Virus Infection                   Reason for visit is   Chief Complaint   Patient presents with    Virtual Regular Visit        Encounter provider Milly Cabral MD    Provider located at [address] [place] [id number]      Recent Visits  No visits were found meeting these conditions  Showing recent visits within past 7 days and meeting all other requirements     Today's Visits  Date Type Provider Dept   07/22/20 [id number] MD [address] today's visits and meeting all other requirements     Future Appointments  No visits were found meeting these conditions  Showing future appointments within next 150 days and meeting all other requirements        The patient was identified by name and date of birth  Kourtney Aggarwal was informed that this is a telemedicine visit and that the visit is being conducted through [employer] and patient was informed that this is not a secure, HIPAA-complaint platform  She agrees to proceed     My office door was closed  No one else was in the room  She acknowledged consent and understanding of privacy and security of the video platform  The patient has agreed to participate and understands they can discontinue the visit at any time  Patient is aware this is a billable service  Subjective  Kourtney Aggarwal is a 62 y o  female calls in with concerns about COVID-19  Patient has been seen in the past many years ago  Had been in with her mother  Although she has not been in recently  We are conducting this via android phone which has been borrowed by patient          Patient calls in today for evaluation for COVID-19  Patient noted fever above 100 for the last 24-48 hours  She does have a mild cough and some mild headache also  She works at [employer] is a   They have precautions in place  Occasional exposure to page renew does not have a mask  She has noted some fatigue over the last several weeks  She had episodes thigh 3-4 days ago where she woke up with chills  Past Medical History:   Diagnosis Date    No known health problems     Septic arthritis ([address] )        Past Surgical History:   Procedure Laterality Date    HYSTERECTOMY      SHOULDER ARTHROSCOPY Left 12/3/2019    Procedure: ARTHROSCOPY SHOULDER;  Surgeon: Deejay Jacobson MD;  Location: [facility] OR;  Service: Orthopedics       Current Outpatient Medications   Medication Sig Dispense Refill    acetaminophen (TYLENOL) 325 mg tablet Take 2 tablets (650 mg total) by mouth every 6 (six) hours 30 tablet 0    lidocaine (XYLOCAINE) 5 % ointment Apply topically as needed for mild pain 35 44 g 0    nicotine (NICODERM CQ) 14 mg/24hr TD 24 hr patch Place 1 patch on the skin daily (Patient not taking: Reported on 2/21/2020) 28 patch 0     No current facility-administered medications for this visit  Allergies   Allergen Reactions    Iodinated Diagnostic Agents Hives    Penicillins Hives       Review of Systems   Constitutional: Positive for fatigue and fever  Negative for appetite change, chills and diaphoresis  HENT: Negative for ear pain, rhinorrhea, sinus pressure and sore throat  Eyes: Negative for discharge, redness and itching  Respiratory: Positive for cough  Negative for shortness of breath and wheezing  Cardiovascular: Negative for chest pain and palpitations  Rapid or slow heart rate   Gastrointestinal: Negative for abdominal pain, diarrhea, nausea and vomiting  Neurological: Positive for headaches  Video Exam    There were no vitals filed for this visit      Physical Exam Constitutional: She appears well-developed and well-nourished  No distress  HENT:   Head: Normocephalic and atraumatic  Pulmonary/Chest: Effort normal  No respiratory distress  Neurological: She is alert  I spent 15 minutes with patient today in which greater than 50% of the time was spent in counseling/coordination of care regarding COVID 19  Patient Instructions   Will send patient to lab for testing  She will stay home from work and self isolate in the home  She is aware this may take 4-7 days to get testing back  No will be sent to her employer  Symptomatic treatment  Protocols were discussed  Patient will cough having worsening symptoms particular shortness of breath  VIRTUAL VISIT DISCLAIMER     Flraymond acknowledges that she has consented to an online visit or consultation  She understands that the online visit is based solely on information provided by her, and that, in the absence of a face-to-face physical evaluation by the physician, the diagnosis she receives is both limited and provisional in terms of accuracy and completeness  This is not intended to replace a full medical face-to-face evaluation by the physician  Kenyatta Thibodeaux understands and accepts these terms

## 2020-07-25 LAB — SARS-COV-2 RNA SPEC QL NAA+PROBE: NOT DETECTED

## 2020-07-27 ENCOUNTER — TELEPHONE (OUTPATIENT)
Dept: FAMILY MEDICINE CLINIC | Facility: CLINIC | Age: 60
End: 2020-07-27

## 2020-07-27 NOTE — TELEPHONE ENCOUNTER
----- Message from Milly Cabral MD sent at 7/27/2020 12:46 PM EDT -----  Call patient with lab result-can I set up a telephone consultation later on today?

## 2020-07-28 ENCOUNTER — TELEPHONE (OUTPATIENT)
Dept: FAMILY MEDICINE CLINIC | Facility: CLINIC | Age: 60
End: 2020-07-28

## 2020-07-28 ENCOUNTER — TELEMEDICINE (OUTPATIENT)
Dept: FAMILY MEDICINE CLINIC | Facility: CLINIC | Age: 60
End: 2020-07-28
Payer: COMMERCIAL

## 2020-07-28 ENCOUNTER — TELEPHONE (OUTPATIENT)
Dept: OBGYN CLINIC | Facility: HOSPITAL | Age: 60
End: 2020-07-28

## 2020-07-28 DIAGNOSIS — M25.531 WRIST PAIN, ACUTE, RIGHT: Primary | ICD-10-CM

## 2020-07-28 PROCEDURE — G2012 BRIEF CHECK IN BY MD/QHP: HCPCS | Performed by: FAMILY MEDICINE

## 2020-07-28 NOTE — PATIENT INSTRUCTIONS
Will refer back to Dr Adrianna Turner  Patient was told she should have a smart phone as they may wish to do a video visit and will need to take a look at the wrist   If they are unable to that they may have her go directly to the emergency room for further evaluation

## 2020-07-28 NOTE — PROGRESS NOTES
Virtual Brief Visit    Assessment/Plan:    Problem List Items Addressed This Visit        Other    Wrist pain, acute, right - Primary    Relevant Orders    Ambulatory referral to Orthopedic Surgery                Reason for visit is No chief complaint on file  Encounter provider Judah Andersen MD    Provider located at [address] [place] [phone]    Recent Visits  Date Type Provider Dept   07/27/20 Telephone Sim Hutchison MA [facility]   07/22/20 [id number], MD [facility] [address] [facility] recent visits within past 7 days and meeting all other requirements     Today's Visits  Date Type Provider Dept   07/28/20 Telemedicine Judah Andersen MD [facility] [address] [facility] today's visits and meeting all other requirements     Future Appointments  No visits were found meeting these conditions  Showing future appointments within next 150 days and meeting all other requirements        After connecting through telephone, the patient was identified by name and date of birth  Gideon Beckford was informed that this is a telemedicine visit and that the visit is being conducted through telephone  My office door was closed  No one else was in the room  She acknowledged consent and understanding of privacy and security of the platform  The patient has agreed to participate and understands she can discontinue the visit at any time  Patient is aware this is a billable service  Alvarado Spain is a 62 y o  female for f/u  Calling patient to follow-up on COVID testing  COVID was negative  She reports she is still having some chills  Mild nausea  Over the last 1-2 days she has had a return of her right wrist pain  This had been treated in the hospital back in February  She was supposed to have a follow-up with hand surgery Dr West Mckinney    That consult did not occur due to pandemic  She says it is swollen and tender  She feels this is what is adding to her current discomfort  She has no documented fever  This visit is only a telephone as she does not have a smart phone available today  Past Medical History:   Diagnosis Date    No known health problems     Septic arthritis ([address] )        Past Surgical History:   Procedure Laterality Date    HYSTERECTOMY      SHOULDER ARTHROSCOPY Left 12/3/2019    Procedure: ARTHROSCOPY SHOULDER;  Surgeon: Olga Leon MD;  Location: [facility] OR;  Service: Orthopedics       Current Outpatient Medications   Medication Sig Dispense Refill    acetaminophen (TYLENOL) 325 mg tablet Take 2 tablets (650 mg total) by mouth every 6 (six) hours 30 tablet 0    lidocaine (XYLOCAINE) 5 % ointment Apply topically as needed for mild pain 35 44 g 0    nicotine (NICODERM CQ) 14 mg/24hr TD 24 hr patch Place 1 patch on the skin daily (Patient not taking: Reported on 2/21/2020) 28 patch 0     No current facility-administered medications for this visit  Allergies   Allergen Reactions    Iodinated Diagnostic Agents Hives    Penicillins Hives       Review of Systems   Constitutional: Positive for chills  Negative for diaphoresis, fatigue and fever  Respiratory: Positive for cough  Negative for shortness of breath and wheezing  Cardiovascular: Negative for chest pain and palpitations  Musculoskeletal: Positive for joint swelling  Skin: Positive for color change  There were no vitals filed for this visit  I spent 15 minutes directly with the patient during this visit  Patient Instructions   Will refer back to Dr Alan Archuleta  Patient was told she should have a smart phone as they may wish to do a video visit and will need to take a look at the wrist   If they are unable to that they may have her go directly to the emergency room for further evaluation        VIRTUAL VISIT DISCLAIMER    Beni Rodriguez acknowledges that she has consented to an online visit or consultation  She understands that the online visit is based solely on information provided by her, and that, in the absence of a face-to-face physical evaluation by the physician, the diagnosis she receives is both limited and provisional in terms of accuracy and completeness  This is not intended to replace a full medical face-to-face evaluation by the physician  Flor Scott understands and accepts these terms

## 2020-07-28 NOTE — TELEPHONE ENCOUNTER
----- Message from Juan Herrera PA-C sent at 7/28/2020  2:05 PM EDT -----  Regarding: RE: Wrist pain  Pt referred to Dr Myrtle Palmer back in Feb (new to him but was seen in the hospital by Dr Mae Edwards) for hand/wrist pain and swelling  Can we get her an appointment? Looks like she had a negative COVID test 7/22, but will still have to get screened closer to her appointment  Would prefer in patient visit, can do VV if she has +screening      ----- Message -----  From: Maxim Bauer MD  Sent: 7/28/2020  11:32 AM EDT  To: Kimberly Tucker MD  Subject: Wrist pain                                       Spoke with patient today for a telephone visit  She did not have a smart phone to allow exam   She has a recurrence of her right wrist pain which she had back in February  Her initial consultation with you had been postponed due to pandemic restrictions  I am referring her back to you  She is having chills but no fever  I have asked her to have a smart phone available if you wish to do a video visit    Thanks

## 2020-07-28 NOTE — TELEPHONE ENCOUNTER
Pt call requesting a letter for work release that stated she is COVID-19 Negative and she can return to work  Fax [phone] once approve

## 2020-07-28 NOTE — TELEPHONE ENCOUNTER
Called patient to schedule appointment for right wrist pain, no answer, unable to leave message, patient can be scheduled for next available with Dr Scott Adams

## 2020-08-24 ENCOUNTER — OFFICE VISIT (OUTPATIENT)
Dept: OBGYN CLINIC | Facility: CLINIC | Age: 60
End: 2020-08-24
Payer: COMMERCIAL

## 2020-08-24 VITALS
HEIGHT: 67 IN | BODY MASS INDEX: 18.21 KG/M2 | DIASTOLIC BLOOD PRESSURE: 70 MMHG | SYSTOLIC BLOOD PRESSURE: 130 MMHG | WEIGHT: 116 LBS

## 2020-08-24 DIAGNOSIS — M25.531 WRIST PAIN, ACUTE, RIGHT: ICD-10-CM

## 2020-08-24 PROCEDURE — 3008F BODY MASS INDEX DOCD: CPT | Performed by: ORTHOPAEDIC SURGERY

## 2020-08-24 PROCEDURE — 99214 OFFICE O/P EST MOD 30 MIN: CPT | Performed by: ORTHOPAEDIC SURGERY

## 2020-08-24 NOTE — PROGRESS NOTES
ASSESSMENT/PLAN:    Assessment:   Inflammatory arthritis  Carpal Tunnel Syndrome  bilateral    Plan:   20-year-old female with combination inflammatory arthritis and bilateral carpal tunnel syndrome  In regards to her inflammatory arthritis, she had rheumatologic screening labs drawn while an inpatient back in February which were positive for cyclic citrul peptide antibody  We will refer her to rheumatology for further evaluation treatment  In regards her bilateral carpal tunnel syndrome, she was instructed on strict nighttime brace usage  She should return to the office in 6 weeks for repeat exam and further treatment discussion at that time  Follow Up:  6  week(s)      General Discussions:  Carpal Tunnel Syndrome: The anatomy and physiology of carpal tunnel syndrome was discussed with the patient today  Increase pressure localized under the transverse carpal ligament can cause pain, numbness, tingling, or dysesthesias within the median nerve distribution as well as feelings of fatigue, clumsiness, or awkwardness  These symptoms typically occur at night and worse in the morning upon waking  Eventually, untreated carpal tunnel syndrome can result in weakness and permanent loss of muscle within the thenar compartment of the hand  Treatment options were discussed with the patient  Conservative treatment includes nocturnal resting splints to keep the nerve in a neutral position, ergonomic changes within the work or home environment, activity modification, and tendon gliding exercises  Steroid injections within the carpal canal can help a majority of patients, however this is often self-limited in a majority of patients    Surgical intervention to divide the transverse carpal ligament typically results in a long-lasting relief of the patient's complaints, with the recurrence rate of less than 1%        _____________________________________________________  CHIEF COMPLAINT:  Chief Complaint   Patient presents with    Right Wrist - Pain         SUBJECTIVE:  Aracelis Ferrell is a 56 y o  female who presents with right wrist pain and bilateral hand numbness  Patient reports she has had random, intermittent episodes of swelling and pain to her bilateral hands for many years  Over the past year this has also begun to involve her bilateral shoulders and her right wrist   She recently had another episode this 2 weeks ago which has since resolved  During these episodes reports significant pain and swelling  Pain is worse with palpation or movement, better with rest   She said no treatment for these conditions in the past   Additionally, patient reports bilateral hand numbness which has been going on for the past year  Numbness is worse at night  Patient describes positive flick sign  She does occasionally have clumsiness or weakness with  strength  Symptoms are worse on the right than left  Patient is right-hand dominant and works as a   PAST MEDICAL HISTORY:  Past Medical History:   Diagnosis Date    No known health problems     [address] [address] )        PAST SURGICAL HISTORY:  Past Surgical History:   Procedure Laterality Date    HYSTERECTOMY      SHOULDER ARTHROSCOPY Left 12/3/2019    Procedure: ARTHROSCOPY SHOULDER;  Surgeon: Erwin Saxena MD;  Location: [facility];  Service: Orthopedics       FAMILY HISTORY:  History reviewed  No pertinent family history      SOCIAL HISTORY:  Social History     Tobacco Use    Smoking status: Current Every Day Smoker     Packs/day: 0 50    Smokeless tobacco: Never Used    Tobacco comment: refused   Substance Use Topics    Alcohol use: Not Currently    Drug use: No       MEDICATIONS:    Current Outpatient Medications:     acetaminophen (TYLENOL) 325 mg tablet, Take 2 tablets (650 mg total) by mouth every 6 (six) hours, Disp: 30 tablet, Rfl: 0    lidocaine (XYLOCAINE) 5 % ointment, Apply topically as needed for mild pain (Patient not taking: Reported on 8/24/2020), Disp: 35 44 g, Rfl: 0    nicotine (NICODERM CQ) 14 mg/24hr TD 24 hr patch, Place 1 patch on the skin daily (Patient not taking: Reported on 2/21/2020), Disp: 28 patch, Rfl: 0    ALLERGIES:  Allergies   Allergen Reactions    Iodinated Diagnostic Agents Hives    Penicillins Hives       REVIEW OF SYSTEMS:  Pertinent items are noted in HPI  A comprehensive review of systems was negative      LABS:  HgA1c: No results found for: HGBA1C  BMP:   Lab Results   Component Value Date    CALCIUM 8 5 02/24/2020    K 4 0 02/24/2020    CO2 27 02/24/2020     02/24/2020    BUN 11 02/24/2020    CREATININE 0 61 02/24/2020         _____________________________________________________  PHYSICAL EXAMINATION:  Vital signs: /70   Ht 5' 7" (1 702 m)   Wt 52 6 kg (116 lb)   BMI 18 17 kg/m²   General: well developed and well nourished, alert, oriented times 3 and appears comfortable  Psychiatric: Normal  HEENT: Trachea Midline, No torticollis  Cardiovascular: No discernable arrhythmia  Pulmonary: No wheezing or stridor  Skin: No masses, erythema, lacerations, fluctation, ulcerations  Neurovascular: Sensation Intact to the Median, Ulnar, Radial Nerve, Motor Intact to the Median, Ulnar, Radial Nerve and Pulses Intact    MUSCULOSKELETAL EXAMINATION:  MSK right upper extremity  -skin intact over the wrist without erythema or swelling  -TTP over base of thumb  -motor intact 5/5/ biceps, triceps, wrist flexion/ext, fdi, APB  - positive tinel at wrist, positive modified durkan  -2 sec cap refill    MSK left upper extremity  -skin intact over the wrist without erythema or swelling  -No tenderness to palpation  -motor intact 5/5/ biceps, triceps, wrist flexion/ext, fdi, APB  -negative tinel at wrist, positive modified durkan  -2 sec cap refill    _____________________________________________________  STUDIES REVIEWED:  Images were reviewed in PACS by Dr Melonie Galaviz and demonstrate:  X-ray of the right wrist shows no fracture, dislocation, or any significant degenerative changes  PROCEDURES PERFORMED:  No Procedures performed today      I interviewed, took the history and examined the patient  I discuss the case with the resident and reviewed the resident's note  I supervised the resident and I agree with the resident management plan as it was presented to me  I was present in the clinic and examined the patient

## 2020-09-01 ENCOUNTER — OFFICE VISIT (OUTPATIENT)
Dept: FAMILY MEDICINE CLINIC | Facility: CLINIC | Age: 60
End: 2020-09-01
Payer: COMMERCIAL

## 2020-09-01 VITALS
RESPIRATION RATE: 16 BRPM | DIASTOLIC BLOOD PRESSURE: 76 MMHG | SYSTOLIC BLOOD PRESSURE: 128 MMHG | HEART RATE: 68 BPM | TEMPERATURE: 98.1 F | HEIGHT: 68 IN | WEIGHT: 117 LBS | OXYGEN SATURATION: 96 % | BODY MASS INDEX: 17.73 KG/M2

## 2020-09-01 DIAGNOSIS — Z12.31 SCREENING MAMMOGRAM, ENCOUNTER FOR: ICD-10-CM

## 2020-09-01 DIAGNOSIS — R63.6 UNDERWEIGHT: ICD-10-CM

## 2020-09-01 DIAGNOSIS — Z13.6 SCREENING FOR CARDIOVASCULAR CONDITION: ICD-10-CM

## 2020-09-01 DIAGNOSIS — Z12.11 ENCOUNTER FOR SCREENING FOR MALIGNANT NEOPLASM OF COLON: Primary | ICD-10-CM

## 2020-09-01 DIAGNOSIS — Z12.11 SCREENING FOR COLORECTAL CANCER: ICD-10-CM

## 2020-09-01 DIAGNOSIS — Z12.12 SCREENING FOR COLORECTAL CANCER: ICD-10-CM

## 2020-09-01 DIAGNOSIS — T78.3XXA ANGIOEDEMA OF LIPS, INITIAL ENCOUNTER: Primary | ICD-10-CM

## 2020-09-01 PROCEDURE — 99214 OFFICE O/P EST MOD 30 MIN: CPT | Performed by: FAMILY MEDICINE

## 2020-09-01 RX ORDER — PREDNISONE 20 MG/1
TABLET ORAL
Qty: 15 TABLET | Refills: 0 | Status: SHIPPED | OUTPATIENT
Start: 2020-09-01 | End: 2021-10-18 | Stop reason: ALTCHOICE

## 2020-09-01 NOTE — PROGRESS NOTES
[id number]        NAME: Hernandez Leavitt is a 56 y o  female  : 1960    MRN: [id number]  DATE: 2020  TIME: 12:09 PM    Assessment and Plan   Angioedema of lips, initial encounter [T78  3XXA]  1  Angioedema of lips, initial encounter  predniSONE 20 mg tablet    Comprehensive metabolic panel    CBC and differential    MARK w/Reflex if Positive    Comprehensive metabolic panel    CBC and differential    MARK w/Reflex if Positive   2  Screening mammogram, encounter for  Mammo screening bilateral w 3d & cad   3  Screening for cardiovascular condition  Comprehensive metabolic panel    Lipid Panel with Direct LDL reflex    Comprehensive metabolic panel    Lipid Panel with Direct LDL reflex   4  Underweight         No problem-specific Assessment & Plan notes found for this encounter  Patient Instructions     Patient Instructions   There is no apparent cause for this swelling  I would give you a trial of prednisone to help with potential allergic reaction  This may be a recurrent phenomenon  Only time will tell  You may also continues Benadryl on an as-needed basis  Also cool compresses may help  Get labs as ordered in approximately 2 weeks  And schedule follow-up visit in 3-4 weeks  Angioedema   WHAT YOU NEED TO KNOW:   Angioedema is sudden swelling caused by fluid that collects in deep layers of the skin  Swelling occurs most often on the face, lips, tongue, or throat, but it can happen anywhere on the body  Your risk for angioedema increases if you have food or insect allergies or a family history of angioedema  Emotional stress, autoimmune disorders, and medicines, such as ACE inhibitors, also increase your risk  Your symptoms may be mild, or you may develop anaphylaxis  Anaphylaxis is a sudden, life-threatening reaction that needs immediate treatment     DISCHARGE INSTRUCTIONS:   Call 911 for signs or symptoms of anaphylaxis,  such as trouble breathing, swelling in your mouth or throat, or wheezing  You may also have itching, a rash, hives, or feel like you are going to faint  Return to the emergency department if:   · You have sudden behavior changes or irritability  · You are dizzy and your heart is beating faster than usual   Contact your healthcare provider if:   · Your swelling does not improve, even after you take your medicines  · You have questions or concerns about your condition or care  Medicines:   · Antihistamines  decrease mild symptoms such as itching or a rash  · Epinephrine  is used to treat severe allergic reactions such as anaphylaxis  · Steroids: This medicine may be given to decrease redness, pain, and swelling  · Take your medicine as directed  Contact your healthcare provider if you think your medicine is not helping or if you have side effects  Tell him of her if you are allergic to any medicine  Keep a list of the medicines, vitamins, and herbs you take  Include the amounts, and when and why you take them  Bring the list or the pill bottles to follow-up visits  Carry your medicine list with you in case of an emergency  Steps to take for signs or symptoms of anaphylaxis:   · Immediately  give 1 shot of epinephrine only into the outer thigh muscle  · Leave the shot in place  as directed  Your healthcare provider may recommend you leave it in place for up to 10 seconds before you remove it  This helps make sure all of the epinephrine is delivered  · Call 911 and go to the emergency department,  even if the shot improved symptoms  Do not drive yourself  Bring the used epinephrine shot with you  Safety precautions to take if you are at risk for anaphylaxis:   · Keep 2 shots of epinephrine with you at all times  You may need a second shot, because epinephrine only works for about 20 minutes and symptoms may return  Your healthcare provider can show you and family members how to give the shot   Check the expiration date every month and replace it before it expires  · Create an action plan  Your healthcare provider can help you create a written plan that explains the allergy and an emergency plan to treat a reaction  The plan explains when to give a second epinephrine shot if symptoms return or do not improve after the first  Give copies of the action plan and emergency instructions to family members, work and school staff, and  providers  Show them how to give a shot of epinephrine  · Be careful when you exercise  If you have had exercise-induced anaphylaxis, do not exercise right after you eat  Stop exercising right away if you start to develop any signs or symptoms of anaphylaxis  You may first feel tired, warm, or have itchy skin  Hives, swelling, and severe breathing problems may develop if you continue to exercise  · Carry medical alert identification  Wear medical alert jewelry or carry a card that explains the allergy  Ask your healthcare provider where to get these items  · Keep a symptom diary  Include information about how often your symptoms occur, how long they last, and if they are mild or severe  Also keep information on what you ate, what happened, or which medicines you took before the swelling started  · Avoid triggers  Triggers include foods, medicines, and other things that you know cause symptoms  You may need to see a specialist, such as an allergist or dietitian, to learn what to avoid  Follow up with your healthcare provider as directed:  Write down your questions so you remember to ask them during your visits  © 2017 [employer] Information is for End User's use only and may not be sold, redistributed or otherwise used for commercial purposes  All illustrations and images included in CareNotes® are the copyrighted property of [employer]  or Fredy Medeiros  The above information is an  only   It is not intended as medical advice for individual conditions or treatments  Talk to your doctor, nurse or pharmacist before following any medical regimen to see if it is safe and effective for you  Chief Complaint     Chief Complaint   Patient presents with    Facial Swelling     lips since yesterday         History of Present Illness       HPI    Review of Systems   Review of Systems   Constitutional: Negative for appetite change, chills, diaphoresis and fever  HENT: Negative for ear pain, rhinorrhea, sinus pressure and sore throat  Eyes: Negative for discharge, redness and itching  Respiratory: Negative for cough, shortness of breath and wheezing  Cardiovascular: Negative for chest pain and palpitations  Rapid or slow heart rate   Gastrointestinal: Negative for abdominal pain, diarrhea, nausea and vomiting  Skin: Negative for color change, rash and wound  Psychiatric/Behavioral: Negative for behavioral problems, decreased concentration and dysphoric mood  The patient is not nervous/anxious  Current Medications       Current Outpatient Medications:     acetaminophen (TYLENOL) 325 mg tablet, Take 2 tablets (650 mg total) by mouth every 6 (six) hours, Disp: 30 tablet, Rfl: 0    predniSONE 20 mg tablet, 1 tab twice daily for 5 days, then 1 tab daily for 5 days  , Disp: 15 tablet, Rfl: 0    Current Allergies     Allergies as of 09/01/2020 - Reviewed 09/01/2020   Allergen Reaction Noted    Iodinated diagnostic agents Hives 12/19/2013    Penicillins Hives 12/19/2013            The following portions of the patient's history were reviewed and updated as appropriate: allergies, current medications, past family history, past medical history, past social history, past surgical history and problem list      Past Medical History:   Diagnosis Date    No known health problems     Septic arthritis ([facility] [address] )        Past Surgical History:   Procedure Laterality Date    HYSTERECTOMY      SHOULDER ARTHROSCOPY Left 12/3/2019    Procedure: ARTHROSCOPY SHOULDER;  Surgeon: Madalyn Gregg MD;  Location: [facility] OR;  Service: Orthopedics       No family history on file  Medications have been verified  Objective   /76   Pulse 68   Temp 98 1 °F (36 7 °C)   Resp 16   Ht 5' 8" (1 727 m)   Wt 53 1 kg (117 lb)   SpO2 96%   BMI 17 79 kg/m²        Physical Exam     Physical Exam  Vitals signs reviewed  Constitutional:       General: She is not in acute distress  Appearance: She is well-developed  HENT:      Head: Normocephalic and atraumatic  Right Ear: Tympanic membrane and external ear normal  No drainage  Left Ear: Tympanic membrane normal  No drainage  Mouth/Throat:      Mouth: Angioedema present  No oral lesions  Tongue: No lesions  Pharynx: Oropharynx is clear  No oropharyngeal exudate  Eyes:      General:         Right eye: No discharge  Left eye: No discharge  Conjunctiva/sclera: Conjunctivae normal    Neck:      Musculoskeletal: Normal range of motion and neck supple  Thyroid: No thyromegaly  Cardiovascular:      Rate and Rhythm: Normal rate and regular rhythm  Heart sounds: Normal heart sounds  Pulmonary:      Effort: Pulmonary effort is normal  No respiratory distress  Breath sounds: No wheezing or rales  Lymphadenopathy:      Cervical: No cervical adenopathy  Skin:     General: Skin is warm and dry  Findings: No erythema or rash  Comments: Swelling left upper lid  No erythema or induration  BMI Counseling: Body mass index is 17 79 kg/m²  The BMI is below normal  No BMI follow-up plan is appropriate  Patient refused follow-up plan

## 2020-09-01 NOTE — PROGRESS NOTES
[id number]         NAME: Eren Crawford is a 56 y o  female  : 1960    MRN: [id number]  DATE: 2020  TIME: 10:56 AM    Assessment and Plan   Angioedema of lips, initial encounter [T78  3XXA]  1  Angioedema of lips, initial encounter  predniSONE 20 mg tablet    Comprehensive metabolic panel    CBC and differential    MARK w/Reflex if Positive    Comprehensive metabolic panel    CBC and differential    MARK w/Reflex if Positive   2  Screening mammogram, encounter for  Mammo screening bilateral w 3d & cad   3  Screening for cardiovascular condition  Comprehensive metabolic panel    Lipid Panel with Direct LDL reflex    Comprehensive metabolic panel    Lipid Panel with Direct LDL reflex       No problem-specific Assessment & Plan notes found for this encounter  Patient Instructions     Patient Instructions   There is no apparent cause for this swelling  I would give you a trial of prednisone to help with potential allergic reaction  This may be a recurrent phenomenon  Only time will tell  You may also continues Benadryl on an as-needed basis  Also cool compresses may help  Get labs as ordered in approximately 2 weeks  And schedule follow-up visit in 3-4 weeks  Angioedema   WHAT YOU NEED TO KNOW:   Angioedema is sudden swelling caused by fluid that collects in deep layers of the skin  Swelling occurs most often on the face, lips, tongue, or throat, but it can happen anywhere on the body  Your risk for angioedema increases if you have food or insect allergies or a family history of angioedema  Emotional stress, autoimmune disorders, and medicines, such as ACE inhibitors, also increase your risk  Your symptoms may be mild, or you may develop anaphylaxis  Anaphylaxis is a sudden, life-threatening reaction that needs immediate treatment     DISCHARGE INSTRUCTIONS:   Call 911 for signs or symptoms of anaphylaxis,  such as trouble breathing, swelling in your mouth or throat, or wheezing  You may also have itching, a rash, hives, or feel like you are going to faint  Return to the emergency department if:   · You have sudden behavior changes or irritability  · You are dizzy and your heart is beating faster than usual   Contact your healthcare provider if:   · Your swelling does not improve, even after you take your medicines  · You have questions or concerns about your condition or care  Medicines:   · Antihistamines  decrease mild symptoms such as itching or a rash  · Epinephrine  is used to treat severe allergic reactions such as anaphylaxis  · Steroids: This medicine may be given to decrease redness, pain, and swelling  · Take your medicine as directed  Contact your healthcare provider if you think your medicine is not helping or if you have side effects  Tell him of her if you are allergic to any medicine  Keep a list of the medicines, vitamins, and herbs you take  Include the amounts, and when and why you take them  Bring the list or the pill bottles to follow-up visits  Carry your medicine list with you in case of an emergency  Steps to take for signs or symptoms of anaphylaxis:   · Immediately  give 1 shot of epinephrine only into the outer thigh muscle  · Leave the shot in place  as directed  Your healthcare provider may recommend you leave it in place for up to 10 seconds before you remove it  This helps make sure all of the epinephrine is delivered  · Call 911 and go to the emergency department,  even if the shot improved symptoms  Do not drive yourself  Bring the used epinephrine shot with you  Safety precautions to take if you are at risk for anaphylaxis:   · Keep 2 shots of epinephrine with you at all times  You may need a second shot, because epinephrine only works for about 20 minutes and symptoms may return  Your healthcare provider can show you and family members how to give the shot   Check the expiration date every month and replace it before it expires  · Create an action plan  Your healthcare provider can help you create a written plan that explains the allergy and an emergency plan to treat a reaction  The plan explains when to give a second epinephrine shot if symptoms return or do not improve after the first  Give copies of the action plan and emergency instructions to family members, work and school staff, and  providers  Show them how to give a shot of epinephrine  · Be careful when you exercise  If you have had exercise-induced anaphylaxis, do not exercise right after you eat  Stop exercising right away if you start to develop any signs or symptoms of anaphylaxis  You may first feel tired, warm, or have itchy skin  Hives, swelling, and severe breathing problems may develop if you continue to exercise  · Carry medical alert identification  Wear medical alert jewelry or carry a card that explains the allergy  Ask your healthcare provider where to get these items  · Keep a symptom diary  Include information about how often your symptoms occur, how long they last, and if they are mild or severe  Also keep information on what you ate, what happened, or which medicines you took before the swelling started  · Avoid triggers  Triggers include foods, medicines, and other things that you know cause symptoms  You may need to see a specialist, such as an allergist or dietitian, to learn what to avoid  Follow up with your healthcare provider as directed:  Write down your questions so you remember to ask them during your visits  © 2017 [employer] Information is for End User's use only and may not be sold, redistributed or otherwise used for commercial purposes  All illustrations and images included in CareNotes® are the copyrighted property of [employer]  or [employer]ei[employer]  The above information is an  only   It is not intended as medical advice for individual conditions or treatments  Talk to your doctor, nurse or pharmacist before following any medical regimen to see if it is safe and effective for you  Chief Complaint     Chief Complaint   Patient presents with    Facial Swelling     lips since yesterday         History of Present Illness       Patient comes in today with 24 hours of swelling of the alejandra oral area  No recognize changes in food  Only medication is Tylenol  Patient takes no over-the-counter supplements  He has not had this kind of reaction in the past   She reports no sores in the mouth  Patient wears dentures, so is no teeth for possible abscesses  No new topical exposures  No skin changes  No bites are reported  Review of Systems   Review of Systems   Constitutional: Negative for appetite change, chills, diaphoresis and fever  HENT: Negative for ear pain, rhinorrhea, sinus pressure and sore throat  Eyes: Negative for discharge, redness and itching  Respiratory: Negative for cough, choking, chest tightness, shortness of breath and wheezing  Cardiovascular: Negative for chest pain and palpitations  Rapid or slow heart rate   Gastrointestinal: Negative for abdominal pain, diarrhea, nausea and vomiting  Current Medications       Current Outpatient Medications:     acetaminophen (TYLENOL) 325 mg tablet, Take 2 tablets (650 mg total) by mouth every 6 (six) hours, Disp: 30 tablet, Rfl: 0    predniSONE 20 mg tablet, 1 tab twice daily for 5 days, then 1 tab daily for 5 days  , Disp: 15 tablet, Rfl: 0    Current Allergies     Allergies as of 09/01/2020 - Reviewed 09/01/2020   Allergen Reaction Noted    Iodinated diagnostic agents Hives 12/19/2013    Penicillins Hives 12/19/2013             The following portions of the patient's history were reviewed and updated as appropriate: allergies, current medications, past family history, past medical history, past social history, past surgical history and problem list      Past Medical History:   Diagnosis Date    No known health problems     Septic arthritis ([address] )        Past Surgical History:   Procedure Laterality Date    HYSTERECTOMY      SHOULDER ARTHROSCOPY Left 12/3/2019    Procedure: ARTHROSCOPY SHOULDER;  Surgeon: Sherrie Gaffney MD;  Location: [facility] OR;  Service: Orthopedics       No family history on file  Medications have been verified          Objective   /76   Pulse 68   Temp 98 1 °F (36 7 °C)   Resp 16   Ht 5' 8" (1 727 m)   Wt 53 1 kg (117 lb)   SpO2 96%   BMI 17 79 kg/m²        Physical Exam

## 2020-09-01 NOTE — PATIENT INSTRUCTIONS
There is no apparent cause for this swelling  I would give you a trial of prednisone to help with potential allergic reaction  This may be a recurrent phenomenon  Only time will tell  You may also continues Benadryl on an as-needed basis  Also cool compresses may help  Get labs as ordered in approximately 2 weeks  And schedule follow-up visit in 3-4 weeks  Angioedema   WHAT YOU NEED TO KNOW:   Angioedema is sudden swelling caused by fluid that collects in deep layers of the skin  Swelling occurs most often on the face, lips, tongue, or throat, but it can happen anywhere on the body  Your risk for angioedema increases if you have food or insect allergies or a family history of angioedema  Emotional stress, autoimmune disorders, and medicines, such as ACE inhibitors, also increase your risk  Your symptoms may be mild, or you may develop anaphylaxis  Anaphylaxis is a sudden, life-threatening reaction that needs immediate treatment  DISCHARGE INSTRUCTIONS:   Call 911 for signs or symptoms of anaphylaxis,  such as trouble breathing, swelling in your mouth or throat, or wheezing  You may also have itching, a rash, hives, or feel like you are going to faint  Return to the emergency department if:   · You have sudden behavior changes or irritability  · You are dizzy and your heart is beating faster than usual   Contact your healthcare provider if:   · Your swelling does not improve, even after you take your medicines  · You have questions or concerns about your condition or care  Medicines:   · Antihistamines  decrease mild symptoms such as itching or a rash  · Epinephrine  is used to treat severe allergic reactions such as anaphylaxis  · Steroids: This medicine may be given to decrease redness, pain, and swelling  · Take your medicine as directed  Contact your healthcare provider if you think your medicine is not helping or if you have side effects   Tell him of her if you are allergic to any medicine  Keep a list of the medicines, vitamins, and herbs you take  Include the amounts, and when and why you take them  Bring the list or the pill bottles to follow-up visits  Carry your medicine list with you in case of an emergency  Steps to take for signs or symptoms of anaphylaxis:   · Immediately  give 1 shot of epinephrine only into the outer thigh muscle  · Leave the shot in place  as directed  Your healthcare provider may recommend you leave it in place for up to 10 seconds before you remove it  This helps make sure all of the epinephrine is delivered  · Call 911 and go to the emergency department,  even if the shot improved symptoms  Do not drive yourself  Bring the used epinephrine shot with you  Safety precautions to take if you are at risk for anaphylaxis:   · Keep 2 shots of epinephrine with you at all times  You may need a second shot, because epinephrine only works for about 20 minutes and symptoms may return  Your healthcare provider can show you and family members how to give the shot  Check the expiration date every month and replace it before it expires  · Create an action plan  Your healthcare provider can help you create a written plan that explains the allergy and an emergency plan to treat a reaction  The plan explains when to give a second epinephrine shot if symptoms return or do not improve after the first  Give copies of the action plan and emergency instructions to family members, work and school staff, and  providers  Show them how to give a shot of epinephrine  · Be careful when you exercise  If you have had exercise-induced anaphylaxis, do not exercise right after you eat  Stop exercising right away if you start to develop any signs or symptoms of anaphylaxis  You may first feel tired, warm, or have itchy skin  Hives, swelling, and severe breathing problems may develop if you continue to exercise  · Carry medical alert identification    Wear medical alert jewelry or carry a card that explains the allergy  Ask your healthcare provider where to get these items  · Keep a symptom diary  Include information about how often your symptoms occur, how long they last, and if they are mild or severe  Also keep information on what you ate, what happened, or which medicines you took before the swelling started  · Avoid triggers  Triggers include foods, medicines, and other things that you know cause symptoms  You may need to see a specialist, such as an allergist or dietitian, to learn what to avoid  Follow up with your healthcare provider as directed:  Write down your questions so you remember to ask them during your visits  © 2017 [address]  Information is for End User's use only and may not be sold, redistributed or otherwise used for commercial purposes  All illustrations and images included in CareNotes® are the copyrighted property of [employer]  or Fredy Medeiros  The above information is an  only  It is not intended as medical advice for individual conditions or treatments  Talk to your doctor, nurse or pharmacist before following any medical regimen to see if it is safe and effective for you

## 2020-09-14 LAB
ALBUMIN SERPL-MCNC: 3.8 G/DL (ref 3.8–4.8)
ALBUMIN/GLOB SERPL: 1.8 {RATIO} (ref 1.2–2.2)
ALP SERPL-CCNC: 65 IU/L (ref 39–117)
ALT SERPL-CCNC: 8 IU/L (ref 0–32)
ANA SER QL: NEGATIVE
AST SERPL-CCNC: 14 IU/L (ref 0–40)
BASOPHILS # BLD AUTO: 0.1 X10E3/UL (ref 0–0.2)
BASOPHILS NFR BLD AUTO: 1 %
BILIRUB SERPL-MCNC: 0.4 MG/DL (ref 0–1.2)
BUN SERPL-MCNC: 14 MG/DL (ref 8–27)
BUN/CREAT SERPL: 18 (ref 12–28)
CALCIUM SERPL-MCNC: 9.1 MG/DL (ref 8.7–10.3)
CHLORIDE SERPL-SCNC: 105 MMOL/L (ref 96–106)
CHOLEST SERPL-MCNC: 139 MG/DL (ref 100–199)
CO2 SERPL-SCNC: 27 MMOL/L (ref 20–29)
CREAT SERPL-MCNC: 0.8 MG/DL (ref 0.57–1)
EOSINOPHIL # BLD AUTO: 0.1 X10E3/UL (ref 0–0.4)
EOSINOPHIL NFR BLD AUTO: 1 %
ERYTHROCYTE [DISTWIDTH] IN BLOOD BY AUTOMATED COUNT: 12.1 % (ref 11.7–15.4)
GLOBULIN SER-MCNC: 2.1 G/DL (ref 1.5–4.5)
GLUCOSE SERPL-MCNC: 82 MG/DL (ref 65–99)
HCT VFR BLD AUTO: 42.6 % (ref 34–46.6)
HDLC SERPL-MCNC: 52 MG/DL
HGB BLD-MCNC: 14 G/DL (ref 11.1–15.9)
IMM GRANULOCYTES # BLD: 0 X10E3/UL (ref 0–0.1)
IMM GRANULOCYTES NFR BLD: 0 %
LDLC SERPL CALC-MCNC: 71 MG/DL (ref 0–99)
LDLC/HDLC SERPL: 1.4 RATIO (ref 0–3.2)
LYMPHOCYTES # BLD AUTO: 2.2 X10E3/UL (ref 0.7–3.1)
LYMPHOCYTES NFR BLD AUTO: 28 %
MCH RBC QN AUTO: 29.4 PG (ref 26.6–33)
MCHC RBC AUTO-ENTMCNC: 32.9 G/DL (ref 31.5–35.7)
MCV RBC AUTO: 90 FL (ref 79–97)
MONOCYTES # BLD AUTO: 0.4 X10E3/UL (ref 0.1–0.9)
MONOCYTES NFR BLD AUTO: 5 %
NEUTROPHILS # BLD AUTO: 5 X10E3/UL (ref 1.4–7)
NEUTROPHILS NFR BLD AUTO: 65 %
PLATELET # BLD AUTO: 309 X10E3/UL (ref 150–450)
POTASSIUM SERPL-SCNC: 5.1 MMOL/L (ref 3.5–5.2)
PROT SERPL-MCNC: 5.9 G/DL (ref 6–8.5)
RBC # BLD AUTO: 4.76 X10E6/UL (ref 3.77–5.28)
SL AMB EGFR AFRICAN AMERICAN: 91 ML/MIN/1.73
SL AMB EGFR NON AFRICAN AMERICAN: 79 ML/MIN/1.73
SL AMB VLDL CHOLESTEROL CALC: 16 MG/DL (ref 5–40)
SODIUM SERPL-SCNC: 140 MMOL/L (ref 134–144)
TRIGL SERPL-MCNC: 80 MG/DL (ref 0–149)
WBC # BLD AUTO: 7.7 X10E3/UL (ref 3.4–10.8)

## 2020-09-15 ENCOUNTER — TELEPHONE (OUTPATIENT)
Dept: FAMILY MEDICINE CLINIC | Facility: CLINIC | Age: 60
End: 2020-09-15

## 2020-09-15 NOTE — TELEPHONE ENCOUNTER
----- Message from Maxim Bauer MD sent at 9/14/2020  2:33 PM EDT -----  Call patient with lab result-labs are okay, will review at office visit later this month

## 2020-09-29 ENCOUNTER — OFFICE VISIT (OUTPATIENT)
Dept: FAMILY MEDICINE CLINIC | Facility: CLINIC | Age: 60
End: 2020-09-29
Payer: COMMERCIAL

## 2020-09-29 VITALS
HEART RATE: 41 BPM | SYSTOLIC BLOOD PRESSURE: 106 MMHG | HEIGHT: 67 IN | WEIGHT: 114 LBS | OXYGEN SATURATION: 98 % | BODY MASS INDEX: 17.89 KG/M2 | DIASTOLIC BLOOD PRESSURE: 72 MMHG | TEMPERATURE: 98.2 F

## 2020-09-29 DIAGNOSIS — Z23 NEED FOR INFLUENZA VACCINATION: ICD-10-CM

## 2020-09-29 DIAGNOSIS — F17.200 ENCOUNTER FOR SCREENING FOR MALIGNANT NEOPLASM OF LUNG IN CURRENT SMOKER WITH 30 PACK YEAR HISTORY OR GREATER: ICD-10-CM

## 2020-09-29 DIAGNOSIS — Z23 NEED FOR 23-POLYVALENT PNEUMOCOCCAL POLYSACCHARIDE VACCINE: ICD-10-CM

## 2020-09-29 DIAGNOSIS — Z12.2 ENCOUNTER FOR SCREENING FOR MALIGNANT NEOPLASM OF LUNG IN CURRENT SMOKER WITH 30 PACK YEAR HISTORY OR GREATER: ICD-10-CM

## 2020-09-29 DIAGNOSIS — Z00.00 ANNUAL PHYSICAL EXAM: Primary | ICD-10-CM

## 2020-09-29 PROBLEM — F17.210 CIGARETTE NICOTINE DEPENDENCE WITHOUT COMPLICATION: Status: ACTIVE | Noted: 2020-09-29

## 2020-09-29 PROCEDURE — 3725F SCREEN DEPRESSION PERFORMED: CPT | Performed by: FAMILY MEDICINE

## 2020-09-29 PROCEDURE — 90472 IMMUNIZATION ADMIN EACH ADD: CPT

## 2020-09-29 PROCEDURE — 99396 PREV VISIT EST AGE 40-64: CPT | Performed by: FAMILY MEDICINE

## 2020-09-29 PROCEDURE — 90471 IMMUNIZATION ADMIN: CPT

## 2020-09-29 PROCEDURE — 90682 RIV4 VACC RECOMBINANT DNA IM: CPT

## 2020-09-29 PROCEDURE — 90732 PPSV23 VACC 2 YRS+ SUBQ/IM: CPT

## 2020-09-29 NOTE — PATIENT INSTRUCTIONS
Return colo guard  Get mammogram as previously ordered  Schedule screening CT of the lungs to evaluate for lung cancer due to long-term smoking  Trial of Pepcid AC 20 mg-over-the-counter-to see whether this would help with your appetite  Continue to work on decreasing cigarette use  Wellness Visit for Adults   AMBULATORY CARE:   A wellness visit  is when you see your healthcare provider to get screened for health problems  You can also get advice on how to stay healthy  Write down your questions so you remember to ask them  Ask your healthcare provider how often you should have a wellness visit  What happens at a wellness visit:  Your healthcare provider will ask about your health, and your family history of health problems  This includes high blood pressure, heart disease, and cancer  He or she will ask if you have symptoms that concern you, if you smoke, and about your mood  You may also be asked about your intake of medicines, supplements, food, and alcohol  Any of the following may be done:  · Your weight  will be checked  Your height may also be checked so your body mass index (BMI) can be calculated  Your BMI shows if you are at a healthy weight  · Your blood pressure  and heart rate will be checked  Your temperature may also be checked  · Blood and urine tests  may be done  Blood tests may be done to check your cholesterol levels  Abnormal cholesterol levels increase your risk for heart disease and stroke  You may also need a blood or urine test to check for diabetes if you are at increased risk  Urine tests may be done to look for signs of an infection or kidney disease  · A physical exam  includes checking your heartbeat and lungs with a stethoscope  Your healthcare provider may also check your skin to look for sun damage  · Screening tests  may be recommended  A screening test is done to check for diseases that may not cause symptoms   The screening tests you may need depend on your age, gender, family history, and lifestyle habits  For example, colorectal screening may be recommended if you are 48years old or older  Screening tests you need if you are a woman:   · A Pap smear  is used to screen for cervical cancer  Pap smears are usually done every 3 to 5 years depending on your age  You may need them more often if you have had abnormal Pap smear test results in the past  Ask your healthcare provider how often you should have a Pap smear  · A mammogram  is an x-ray of your breasts to screen for breast cancer  Experts recommend mammograms every 2 years starting at age 48 years  You may need a mammogram at age 52 years or younger if you have an increased risk for breast cancer  Talk to your healthcare provider about when you should start having mammograms and how often you need them  Vaccines you may need:   · Get an influenza vaccine  every year  The influenza vaccine protects you from the flu  Several types of viruses cause the flu  The viruses change over time, so new vaccines are made each year  · Get a tetanus-diphtheria (Td) booster vaccine  every 10 years  This vaccine protects you against tetanus and diphtheria  Tetanus is a severe infection that may cause painful muscle spasms and lockjaw  Diphtheria is a severe bacterial infection that causes a thick covering in the back of your mouth and throat  · Get a human papillomavirus (HPV) vaccine  if you are female and aged 23 to 32 or male 23 to 24 and never received it  This vaccine protects you from HPV infection  HPV is the most common infection spread by sexual contact  HPV may also cause vaginal, penile, and anal cancers  · Get a pneumococcal vaccine  if you are aged 72 years or older  The pneumococcal vaccine is an injection given to protect you from pneumococcal disease  Pneumococcal disease is an infection caused by pneumococcal bacteria   The infection may cause pneumonia, meningitis, or an ear infection  · Get a shingles vaccine  if you are aged 61 or older, even if you have had shingles before  The shingles vaccine is an injection to protect you from the varicella-zoster virus  This is the same virus that causes chickenpox  Shingles is a painful rash that develops in people who had chickenpox or have been exposed to the virus  How to eat healthy:  My Plate is a model for planning healthy meals  It shows the types and amounts of foods that should go on your plate  Fruits and vegetables make up about half of your plate, and grains and protein make up the other half  A serving of dairy is included on the side of your plate  The amount of calories and serving sizes you need depends on your age, gender, weight, and height  Examples of healthy foods are listed below:  · Eat a variety of vegetables  such as dark green, red, and orange vegetables  You can also include canned vegetables low in sodium (salt) and frozen vegetables without added butter or sauces  · Eat a variety of fresh fruits , canned fruit in 100% juice, frozen fruit, and dried fruit  · Include whole grains  At least half of the grains you eat should be whole grains  Examples include whole-wheat bread, wheat pasta, brown rice, and whole-grain cereals such as oatmeal     · Eat a variety of protein foods such as seafood (fish and shellfish), lean meat, and poultry without skin (turkey and chicken)  Examples of lean meats include pork leg, shoulder, or tenderloin, and beef round, sirloin, tenderloin, and extra lean ground beef  Other protein foods include eggs and egg substitutes, beans, peas, soy products, nuts, and seeds  · Choose low-fat dairy products such as skim or 1% milk or low-fat yogurt, cheese, and cottage cheese  · Limit unhealthy fats  such as butter, hard margarine, and shortening  Exercise:  Exercise at least 30 minutes per day on most days of the week   Some examples of exercise include walking, biking, dancing, and swimming  You can also fit in more physical activity by taking the stairs instead of the elevator or parking farther away from stores  Include muscle strengthening activities 2 days each week  Regular exercise provides many health benefits  It helps you manage your weight, and decreases your risk for type 2 diabetes, heart disease, stroke, and high blood pressure  Exercise can also help improve your mood  Ask your healthcare provider about the best exercise plan for you  General health and safety guidelines:   · Do not smoke  Nicotine and other chemicals in cigarettes and cigars can cause lung damage  Ask your healthcare provider for information if you currently smoke and need help to quit  E-cigarettes or smokeless tobacco still contain nicotine  Talk to your healthcare provider before you use these products  · Limit alcohol  A drink of alcohol is 12 ounces of beer, 5 ounces of wine, or 1½ ounces of liquor  · Lose weight, if needed  Being overweight increases your risk of certain health conditions  These include heart disease, high blood pressure, type 2 diabetes, and certain types of cancer  · Protect your skin  Do not sunbathe or use tanning beds  Use sunscreen with a SPF 15 or higher  Apply sunscreen at least 15 minutes before you go outside  Reapply sunscreen every 2 hours  Wear protective clothing, hats, and sunglasses when you are outside  · Drive safely  Always wear your seatbelt  Make sure everyone in your car wears a seatbelt  A seatbelt can save your life if you are in an accident  Do not use your cell phone when you are driving  This could distract you and cause an accident  Pull over if you need to make a call or send a text message  · Practice safe sex  Use latex condoms if are sexually active and have more than one partner  Your healthcare provider may recommend screening tests for sexually transmitted infections (STIs)      · Wear helmets, lifejackets, and protective gear  Always wear a helmet when you ride a bike or motorcycle, go skiing, or play sports that could cause a head injury  Wear protective equipment when you play sports  Wear a lifejacket when you are on a boat or doing water sports  © 2017 [address] Information is for End User's use only and may not be sold, redistributed or otherwise used for commercial purposes  All illustrations and images included in CareNotes® are the copyrighted property of A D A M [employer]  or Fredy Medeiros  The above information is an  only  It is not intended as medical advice for individual conditions or treatments  Talk to your doctor, nurse or pharmacist before following any medical regimen to see if it is safe and effective for you

## 2020-09-29 NOTE — PROGRESS NOTES
[address]    NAME: Sweetie Yusuf  AGE: 56 y o  SEX: female  : 1960     DATE: 2020     Assessment and Plan:     Problem List Items Addressed This Visit     None      Visit Diagnoses     Annual physical exam    -  Primary    Encounter for screening for malignant neoplasm of lung in current smoker with 30 pack year history or greater        Relevant Orders    CT lung screening program          Return colo guard  Get mammogram as previously ordered  Schedule screening CT of the lungs to evaluate for lung cancer due to long-term smoking  Trial of Pepcid AC 20 mg-over-the-counter-to see whether this would help with your appetite  Continue to work on decreasing cigarette use  Immunizations and preventive care screenings were discussed with patient today  Appropriate education was printed on patient's after visit summary  Counseling:  Dental Health: discussed importance of regular tooth brushing, flossing, and dental visits  · Injury prevention: discussed safety/seat belts, safety helmets, smoke detectors, carbon dioxide detectors, and smoking near bedding or upholstery  Tobacco Cessation Counseling: Tobacco cessation counseling was provided  The patient is sincerely urged to quit consumption of tobacco  She is not ready to quit tobacco        No follow-ups on file  Chief Complaint:     Chief Complaint   Patient presents with    Physical Exam     HM      History of Present Illness:     Adult Annual Physical   Patient here for a comprehensive physical exam  The patient reports no problems  Patient also follow-up on high as which she had last visit  These did resolve with the prednisone  No recurrence after 3 weeks  Diet and Physical Activity  · Diet/Nutrition: well balanced diet, limited junk food and consuming 3-5 servings of fruits/vegetables daily  · Exercise: no formal exercise  Depression Screening  PHQ-9 Depression Screening    PHQ-9:    Frequency of the following problems over the past two weeks:       Little interest or pleasure in doing things:  0 - not at all  Feeling down, depressed, or hopeless:  0 - not at all  PHQ-2 Score:  0       General Health  · Sleep: sleeps poorly  · Hearing: normal - bilateral   · Vision: wears glasses  · Dental: dentures  /GYN Health  · Patient is: postmenopausal  · Last menstrual period: -  · Contraceptive method: hysterectomy  Review of Systems:     Review of Systems   Constitutional: Negative for activity change, appetite change, diaphoresis and fatigue  Respiratory: Positive for cough  Negative for chest tightness, shortness of breath and wheezing  Cardiovascular: Negative for chest pain, palpitations and leg swelling  Fast or slow heart rate   Gastrointestinal: Negative for abdominal pain, blood in stool, constipation, diarrhea, nausea and vomiting  Genitourinary: Negative for difficulty urinating, dysuria and frequency  Musculoskeletal: Negative for arthralgias, gait problem, joint swelling and myalgias  Neurological: Negative for dizziness, light-headedness and headaches  Psychiatric/Behavioral: Negative for agitation, confusion, dysphoric mood and sleep disturbance  The patient is not nervous/anxious         Past Medical History:     Past Medical History:   Diagnosis Date    No known health problems     Septic arthritis ([address] )       Past Surgical History:     Past Surgical History:   Procedure Laterality Date    HYSTERECTOMY      SHOULDER ARTHROSCOPY Left 12/3/2019    Procedure: ARTHROSCOPY SHOULDER;  Surgeon: Wendy Thomson MD;  Location: [facility];  Service: Orthopedics      Social History:        Social History     Socioeconomic History    Marital status: /Civil Union     Spouse name: None    Number of children: None    Years of education: None    Highest education level: None Occupational History    None   Social Needs    Financial resource strain: None    Food insecurity     Worry: None     Inability: None    Transportation needs     Medical: None     Non-medical: None   Tobacco Use    Smoking status: Current Every Day Smoker     Packs/day: 0 50    Smokeless tobacco: Never Used    Tobacco comment: refused   Substance and Sexual Activity    Alcohol use: Not Currently    Drug use: No    Sexual activity: None   Lifestyle    Physical activity     Days per week: None     Minutes per session: None    Stress: None   Relationships    Social connections     Talks on phone: None     Gets together: None     Attends Muslim service: None     Active member of club or organization: None     Attends meetings of clubs or organizations: None     Relationship status: None    Intimate partner violence     Fear of current or ex partner: None     Emotionally abused: None     Physically abused: None     Forced sexual activity: None   Other Topics Concern    None   Social History Narrative    None      Family History:     History reviewed  No pertinent family history  Current Medications:     Current Outpatient Medications   Medication Sig Dispense Refill    acetaminophen (TYLENOL) 325 mg tablet Take 2 tablets (650 mg total) by mouth every 6 (six) hours (Patient not taking: Reported on 9/29/2020) 30 tablet 0    predniSONE 20 mg tablet 1 tab twice daily for 5 days, then 1 tab daily for 5 days  (Patient not taking: Reported on 9/29/2020) 15 tablet 0     No current facility-administered medications for this visit  Allergies:      Allergies   Allergen Reactions    Iodinated Diagnostic Agents Hives    Penicillins Hives      Physical Exam:     /72 (BP Location: Left arm, Patient Position: Sitting, Cuff Size: Large)   Pulse (!) 41   Temp 98 2 °F (36 8 °C) (Tympanic)   Ht 5' 7" (1 702 m)   Wt 51 7 kg (114 lb)   SpO2 98%   BMI 17 85 kg/m²     Physical Exam  Vitals signs and nursing note reviewed  HENT:      Head: Normocephalic and atraumatic  Right Ear: External ear normal       Left Ear: External ear normal    Eyes:      General:         Right eye: No discharge  Left eye: No discharge  Pupils: Pupils are equal, round, and reactive to light  Neck:      Musculoskeletal: Normal range of motion and neck supple  Thyroid: No thyromegaly  Trachea: No tracheal deviation  Cardiovascular:      Rate and Rhythm: Normal rate and regular rhythm  Heart sounds: Normal heart sounds  No murmur  Pulmonary:      Effort: Pulmonary effort is normal  No respiratory distress  Breath sounds: Normal breath sounds  No wheezing  Abdominal:      General: Bowel sounds are normal  There is no distension  Palpations: Abdomen is soft  There is no mass  Tenderness: There is no abdominal tenderness  Musculoskeletal: Normal range of motion  Lymphadenopathy:      Cervical: No cervical adenopathy  Skin:     General: Skin is warm  Findings: No erythema or rash  Neurological:      Mental Status: She is alert and oriented to person, place, and time  Cranial Nerves: No cranial nerve deficit        Deep Tendon Reflexes: Reflexes normal    Psychiatric:         Mood and Affect: Mood normal          Behavior: Behavior normal           Paolo Cabrera MD  [id number]

## 2020-10-05 ENCOUNTER — OFFICE VISIT (OUTPATIENT)
Dept: OBGYN CLINIC | Facility: CLINIC | Age: 60
End: 2020-10-05
Payer: COMMERCIAL

## 2020-10-05 VITALS
HEIGHT: 67 IN | BODY MASS INDEX: 17.74 KG/M2 | WEIGHT: 113 LBS | DIASTOLIC BLOOD PRESSURE: 76 MMHG | SYSTOLIC BLOOD PRESSURE: 118 MMHG

## 2020-10-05 DIAGNOSIS — M18.12 ARTHRITIS OF CARPOMETACARPAL (CMC) JOINT OF LEFT THUMB: ICD-10-CM

## 2020-10-05 DIAGNOSIS — G56.03 CARPAL TUNNEL SYNDROME, BILATERAL: Primary | ICD-10-CM

## 2020-10-05 PROCEDURE — 99214 OFFICE O/P EST MOD 30 MIN: CPT | Performed by: ORTHOPAEDIC SURGERY

## 2020-10-05 PROCEDURE — 20600 DRAIN/INJ JOINT/BURSA W/O US: CPT | Performed by: ORTHOPAEDIC SURGERY

## 2020-10-05 PROCEDURE — 4004F PT TOBACCO SCREEN RCVD TLK: CPT | Performed by: ORTHOPAEDIC SURGERY

## 2020-10-05 RX ORDER — BETAMETHASONE SODIUM PHOSPHATE AND BETAMETHASONE ACETATE 3; 3 MG/ML; MG/ML
3 INJECTION, SUSPENSION INTRA-ARTICULAR; INTRALESIONAL; INTRAMUSCULAR; SOFT TISSUE
Status: COMPLETED | OUTPATIENT
Start: 2020-10-05 | End: 2020-10-05

## 2020-10-05 RX ORDER — LIDOCAINE HYDROCHLORIDE 10 MG/ML
1 INJECTION, SOLUTION INFILTRATION; PERINEURAL
Status: COMPLETED | OUTPATIENT
Start: 2020-10-05 | End: 2020-10-05

## 2020-10-05 RX ADMIN — LIDOCAINE HYDROCHLORIDE 1 ML: 10 INJECTION, SOLUTION INFILTRATION; PERINEURAL at 17:00

## 2020-10-05 RX ADMIN — BETAMETHASONE SODIUM PHOSPHATE AND BETAMETHASONE ACETATE 3 MG: 3; 3 INJECTION, SUSPENSION INTRA-ARTICULAR; INTRALESIONAL; INTRAMUSCULAR; SOFT TISSUE at 17:00

## 2021-01-11 ENCOUNTER — OFFICE VISIT (OUTPATIENT)
Dept: OBGYN CLINIC | Facility: CLINIC | Age: 61
End: 2021-01-11
Payer: COMMERCIAL

## 2021-01-11 VITALS
DIASTOLIC BLOOD PRESSURE: 72 MMHG | BODY MASS INDEX: 18.24 KG/M2 | WEIGHT: 116.2 LBS | HEIGHT: 67 IN | SYSTOLIC BLOOD PRESSURE: 130 MMHG

## 2021-01-11 DIAGNOSIS — G56.03 BILATERAL CARPAL TUNNEL SYNDROME: ICD-10-CM

## 2021-01-11 DIAGNOSIS — G56.23 CUBITAL TUNNEL SYNDROME, BILATERAL: Primary | ICD-10-CM

## 2021-01-11 PROCEDURE — 3008F BODY MASS INDEX DOCD: CPT | Performed by: ORTHOPAEDIC SURGERY

## 2021-01-11 PROCEDURE — 4004F PT TOBACCO SCREEN RCVD TLK: CPT | Performed by: ORTHOPAEDIC SURGERY

## 2021-01-11 PROCEDURE — 99214 OFFICE O/P EST MOD 30 MIN: CPT | Performed by: ORTHOPAEDIC SURGERY

## 2021-01-11 NOTE — PATIENT INSTRUCTIONS
Cubital Tunnel Syndrome    DESCRIPTION  Cubital Tunnel Syndrome is a condition that involves pressure or stretching of the ulnar nerve (also known as the funny bone nerve), which can cause numbness or tingling in the ring and small fingers, pain in the forearm, and/or weakness in the hand  The ulnar nerve (Figure 1) runs in a groove on the inner side of the elbow  CAUSES  There are a few causes of this ulnar nerve problem  These include:    Pressure: The nerve has little padding over it  Direct pressure (like leaning the arm on an arm rest) can press the nerve, causing the arm and hand -- especially the ring and small fingers -- to fall asleep      Stretching:  Keeping the elbow bent for a long time can stretch the nerve behind the elbow  This can happen during sleep  Anatomy:  Sometimes, the ulnar nerve does not stay in its place and snaps back and forth over a bony bump as the elbow is moved  Repeated snapping can irritate the nerve  Sometimes, the soft tissues over the nerve become thicker or there is an extra muscle over the nerve that can keep it from working correctly  SIGNS AND SYMPTOMS  Cubital tunnel syndrome can cause pain, loss of sensation, tingling and/or weakness  Pins and needles usually are felt in the ring and small fingers  These symptoms are often felt when the elbow is bent for a long period of time, such as while holding a phone or while sleeping  Some people feel weak or clumsy  DIAGNOSIS   Your doctor can learn much by asking you about your symptoms and examining you  S/he might test you for other medical problems like diabetes or thyroid disease  Sometimes, nerve testing (EMG/NCS) may be needed to see how much the nerve and muscle are being affected  This test also checks for other problems such as a pinched nerve in the neck, which can cause similar symptoms  TREATMENT  The first treatment is to avoid actions that cause symptoms    Wrapping a pillow or towel loosely around the elbow (see below) or wearing a splint at night to keep the elbow from bending can help  Avoiding leaning on the funny bone can also help  A hand therapist can help you find ways to avoid pressure on the nerve  Sometimes, surgery may be needed to relieve the pressure on the nerve  This can involve releasing the nerve, moving the nerve to the front of the elbow, and/or removing a part of the bone  Your surgeon will talk to you about options  Therapy is sometimes needed after surgery, and the time it takes to recover can vary  Numbness and tingling may improve quickly or slowly  It may take many months for recovery after surgery  Cubital tunnel symptoms may not totally go away after surgery, especially if symptoms are severe  Cubital Tunnel Syndrome  What many people call the funny bone really is a nerve  This ulnar nerve runs behind a bone in the elbow through a space called the cubital tunnel (Figure 1)  Although banging the funny bone usually causes temporary symptoms, chronic pressure on or stretching of the nerve can affect the blood supply to the ulnar nerve, causing numbness or tingling in the ring and small fingers, pain in the forearm, and/or weakness in the hand  This is called cubital tunnel syndrome      Causes  There are a few causes of this ulnar nerve problem  These include:  Pressure  Because the nerve runs through that funny bone groove and has little padding over it, direct pressure (like leaning your arm on an arm rest) can compress the nerve, causing your arm and hand--especially the ring and small fingers--to fall asleep      Stretch  Keeping the elbow bent for a long time can stretchthe nerve behind the elbow  This usually happens during sleep  Anatomy  Sometimes, the ulnar nerve does not stay in its place and snaps back and forth over a bony bump as the elbow is moved  Repetitive snapping can irritate the nerve   Sometimes, the soft tissues over the nerve become thicker or there is an extra muscle over the nerve that can keep the nerve from working correctly  Signs and Symptoms  Cubital tunnel syndrome can cause pain, loss of sensation, and/or tingling  Pins and needles usually are felt in the ring and small fingers  These symptoms are often felt when the elbow is kept bent for a long time, such as while holding a phone or while sleeping  Some people feel weak or clumsy  Loss of sensation and loss of strength or muscle in the hand is serious  Diagnosis  Your doctor will be able to tell a lot by asking you about your symptoms and examining you  S/he might test you for other medical problems like diabetes or thyroid disease  A test called electromyography (EMG) and/or nerve conduction study (NCS) might be needed to see how much the nerve and muscle are being affected  This test also checks for other problems like a pinched nerve in the neck, which can cause similar symptoms  Treatment  The first treatment is to avoid actions that cause symptoms  Wrapping a pillow or towel around the elbow or wearing a splint at night to keep the elbow from bending during sleep can help  Avoiding leaning on the funny bone part of the elbow can help also  A hand therapist can help you learn ways to avoid pressure on the nerve  When symptoms are severe or not getting better, surgery may be needed to relieve the pressure on the nerve  This can involve releasing the nerve, moving the nerve to the front of the elbow, and/or removing a part of the bone  Your surgeon will talk to you about what is the right option for you and guide your care  Therapy sometimes is needed after surgery, and the time it takes to recover varies  Numbness and tingling may improve quickly or slowly, and it may take many months for the strength in your hand to improve  Cubital tunnel symptoms may not totally go away after surgery, especially if symptoms are severe      © 2012 [employer]  www handcare  org

## 2021-01-11 NOTE — PROGRESS NOTES
ASSESSMENT/PLAN:    Assessment:   Carpal Tunnel Syndrome  bilateral, Cubital Tunnel Syndrome  bilateral and Thumb CMC Arthritis  bilateral    Plan:   activity modification and bracing  - provided instructions for towel bracing at night    Follow Up:  6  week(s)    To Do Next Visit:  Assess response to night time bracing  General Discussions:     Cubital Tunnel Syndrome: The anatomy and physiology of cubital tunnel syndrome were discussed with the patient today in the office  Typically, increased elbow flexion activities decrease blood flow within the intraneural spaces, resulting in a feeling of numbness, tingling, weakness, or clumsiness within the hand and fingers  Occasionally, anatomic structures such as medial elbow osteophytes, the medial head of the triceps, were subluxing ulnar nerve may result in increased pressure or aggravation at the cubital tunnel  Typical signs and symptoms usually include numbness and tingling within the ring and small finger, weakness with , and weakness with pinch  Conservative treatment and includes nocturnal bracing to keep the elbow in a semi-extended position, activity modification, therapy, and avoiding excessive elbow flexion activities  A majority of patients typically respond to conservative treatment over a period of approximately 3-6 months  EMG/NCV testing of the ulnar nerve at the elbow is not as reliable as carpal tunnel syndrome  Surgical intervention in the form of in situ release of the ulnar nerve at the elbow or ulnar nerve transposition may be required in up to 20% of patients      _____________________________________________________  CHIEF COMPLAINT:  Chief Complaint   Patient presents with    Left Hand - Follow-up    Right Hand - Follow-up         SUBJECTIVE:  Celestine Sesay is a 56 y o  female who presents for follow up regarding bilateral CMC arthritis and carpal tunnel    Patient had the steroid injections at her last visit with significant improvement in her symptoms  Patient states that she had good relief of her numbness and tingling as well  When she returned to work, she had an increase in her symptoms  She has been now wearing her braces on and off at nighttime which does improve her symptoms  She now has numbness and tingling along the small fingers bilaterally  She states that she has been sleeping in a flexed position when she extends her arm at night it does make this feel better  She denies any injuries or any other acute complaints  PAST MEDICAL HISTORY:  Past Medical History:   Diagnosis Date    No known health problems     Septic arthritis ([address] )        PAST SURGICAL HISTORY:  Past Surgical History:   Procedure Laterality Date    HYSTERECTOMY      SHOULDER ARTHROSCOPY Left 12/3/2019    Procedure: ARTHROSCOPY SHOULDER;  Surgeon: Gary Kerr MD;  Location: [facility] OR;  Service: Orthopedics       FAMILY HISTORY:  History reviewed  No pertinent family history  SOCIAL HISTORY:  Social History     Tobacco Use    Smoking status: Current Every Day Smoker     Packs/day: 0 50    Smokeless tobacco: Never Used   Substance Use Topics    Alcohol use: Not Currently    Drug use: No       MEDICATIONS:    Current Outpatient Medications:     Naproxen Sodium (ALEVE PO), Take by mouth, Disp: , Rfl:     acetaminophen (TYLENOL) 325 mg tablet, Take 2 tablets (650 mg total) by mouth every 6 (six) hours (Patient not taking: Reported on 10/5/2020), Disp: 30 tablet, Rfl: 0    predniSONE 20 mg tablet, 1 tab twice daily for 5 days, then 1 tab daily for 5 days  (Patient not taking: Reported on 9/29/2020), Disp: 15 tablet, Rfl: 0    ALLERGIES:  Allergies   Allergen Reactions    Iodinated Diagnostic Agents Hives    Penicillins Hives       REVIEW OF SYSTEMS:  Pertinent items are noted in HPI  A comprehensive review of systems was negative      LABS:  HgA1c: No results found for: HGBA1C  BMP:   Lab Results   Component Value Date CALCIUM 8 5 02/24/2020    K 5 1 09/12/2020    CO2 27 09/12/2020     09/12/2020    BUN 14 09/12/2020    CREATININE 0 80 09/12/2020           _____________________________________________________  PHYSICAL EXAMINATION:  Vital signs: /72   Ht 5' 7" (1 702 m)   Wt 52 7 kg (116 lb 3 2 oz)   BMI 18 20 kg/m²   General: well developed and well nourished, alert, oriented times 3 and appears comfortable  Psychiatric: Normal  HEENT: Trachea Midline, No torticollis  Cardiovascular: No discernable arrhythmia  Pulmonary: No wheezing or stridor  Skin: No masses, erythema, lacerations, fluctation, ulcerations  Neurovascular: Sensation Intact to the Median, Ulnar, Radial Nerve, Motor Intact to the Median, Ulnar, Radial Nerve and Pulses Intact    MUSCULOSKELETAL EXAMINATION:  LEFT SIDE:  Carpal tunnel:  No weakness APB, No atrophy thenar muscles and Postive Tinel's and Cubital Tunnel:  No weakness ulnar nerve, No ulnar nerve subluxation and Tinel's to the cubital tunnel  RIGHT SIDE:  Carpal tunnel:  No atrophy thenar muscles and Postive Tinel's and Cubital Tunnel:  No weakness ulnar nerve, No ulnar nerve subluxation and Tinel's to the cubital tunnel   Bilateral thumb CMC these with minimal tenderness to palpation, negative Shuck, grind, circumduction test   No evidence crepitation      _____________________________________________________  STUDIES REVIEWED:  No Studies to review      PROCEDURES PERFORMED:  Procedures  No Procedures performed today     Scribe Attestation    I,:  Humphrey Pineda PA-C am acting as a scribe while in the presence of the attending physician :       I,:  Espinoza Moreno MD personally performed the services described in this documentation    as scribed in my presence :

## 2021-02-22 ENCOUNTER — OFFICE VISIT (OUTPATIENT)
Dept: OBGYN CLINIC | Facility: CLINIC | Age: 61
End: 2021-02-22
Payer: COMMERCIAL

## 2021-02-22 VITALS
SYSTOLIC BLOOD PRESSURE: 122 MMHG | HEIGHT: 67 IN | BODY MASS INDEX: 18.68 KG/M2 | WEIGHT: 119 LBS | DIASTOLIC BLOOD PRESSURE: 83 MMHG

## 2021-02-22 DIAGNOSIS — M65.4 DE QUERVAIN'S TENOSYNOVITIS, RIGHT: Primary | ICD-10-CM

## 2021-02-22 PROCEDURE — 99213 OFFICE O/P EST LOW 20 MIN: CPT | Performed by: ORTHOPAEDIC SURGERY

## 2021-02-22 PROCEDURE — 4004F PT TOBACCO SCREEN RCVD TLK: CPT | Performed by: ORTHOPAEDIC SURGERY

## 2021-02-22 PROCEDURE — 20550 NJX 1 TENDON SHEATH/LIGAMENT: CPT | Performed by: ORTHOPAEDIC SURGERY

## 2021-02-22 PROCEDURE — 3008F BODY MASS INDEX DOCD: CPT | Performed by: ORTHOPAEDIC SURGERY

## 2021-02-22 RX ORDER — BETAMETHASONE SODIUM PHOSPHATE AND BETAMETHASONE ACETATE 3; 3 MG/ML; MG/ML
6 INJECTION, SUSPENSION INTRA-ARTICULAR; INTRALESIONAL; INTRAMUSCULAR; SOFT TISSUE
Status: COMPLETED | OUTPATIENT
Start: 2021-02-22 | End: 2021-02-22

## 2021-02-22 RX ORDER — LIDOCAINE HYDROCHLORIDE 10 MG/ML
1 INJECTION, SOLUTION INFILTRATION; PERINEURAL
Status: COMPLETED | OUTPATIENT
Start: 2021-02-22 | End: 2021-02-22

## 2021-02-22 RX ADMIN — BETAMETHASONE SODIUM PHOSPHATE AND BETAMETHASONE ACETATE 6 MG: 3; 3 INJECTION, SUSPENSION INTRA-ARTICULAR; INTRALESIONAL; INTRAMUSCULAR; SOFT TISSUE at 16:21

## 2021-02-22 RX ADMIN — LIDOCAINE HYDROCHLORIDE 1 ML: 10 INJECTION, SOLUTION INFILTRATION; PERINEURAL at 16:21

## 2021-02-22 NOTE — PROGRESS NOTES
ASSESSMENT/PLAN:    Assessment:   de Quervain stenosis tenosynovitis  right    Plan:   steroid injections  - provided in office today    Follow Up:  6  week(s)    To Do Next Visit:  Assess response  General Discussions:  Danielle Mills Tenosynovitis: The anatomy and physiology of de Quervain's tenosynovitis was discussed with the patient today in the office  Edema and increased contact pressure within the first dorsal extensor compartment at the radial styloid can cause pain, crepitation, and limitation of function  Treatment options include resting thumb spica splints to decrease edema, oral anti-inflammatory medications, home or formal therapy exercises, up to 2 steroid injections within the first dorsal extensor compartment, or surgical release  While majority of patients do respond to conservative treatment, up to 20% may require surgical release        _____________________________________________________  CHIEF COMPLAINT:  Chief Complaint   Patient presents with    Left Hand - Follow-up    Right Hand - Follow-up, Swelling         SUBJECTIVE:  Evelyn Murillo is a 56 y o  female who presents for follow up  Patient states that she has had new pain in the right wrist over the last few weeks  She states that the previous numbness and tingling are not the most bothersome features at this time  They are not waking her up from sleep, but the new right wrist pain is  Handedness: right    PAST MEDICAL HISTORY:  Past Medical History:   Diagnosis Date    No known health problems     Septic arthritis ([address] )        PAST SURGICAL HISTORY:  Past Surgical History:   Procedure Laterality Date    HYSTERECTOMY      SHOULDER ARTHROSCOPY Left 12/3/2019    Procedure: ARTHROSCOPY SHOULDER;  Surgeon: Keisha Dooley MD;  Location: [facility] OR;  Service: Orthopedics       FAMILY HISTORY:  History reviewed  No pertinent family history      SOCIAL HISTORY:  Social History     Tobacco Use    Smoking status: Current Every Day Smoker     Packs/day: 0 50    Smokeless tobacco: Never Used   Substance Use Topics    Alcohol use: Not Currently    Drug use: No       MEDICATIONS:    Current Outpatient Medications:     Naproxen Sodium (ALEVE PO), Take by mouth, Disp: , Rfl:     acetaminophen (TYLENOL) 325 mg tablet, Take 2 tablets (650 mg total) by mouth every 6 (six) hours (Patient not taking: Reported on 10/5/2020), Disp: 30 tablet, Rfl: 0    predniSONE 20 mg tablet, 1 tab twice daily for 5 days, then 1 tab daily for 5 days  (Patient not taking: Reported on 9/29/2020), Disp: 15 tablet, Rfl: 0    ALLERGIES:  Allergies   Allergen Reactions    Iodinated Diagnostic Agents Hives    Penicillins Hives       REVIEW OF SYSTEMS:  Pertinent items are noted in HPI  A comprehensive review of systems was negative      LABS:  HgA1c: No results found for: HGBA1C  BMP:   Lab Results   Component Value Date    CALCIUM 8 5 02/24/2020    K 5 1 09/12/2020    CO2 27 09/12/2020     09/12/2020    BUN 14 09/12/2020    CREATININE 0 80 09/12/2020           _____________________________________________________  PHYSICAL EXAMINATION:  Vital signs: /83   Ht 5' 7" (1 702 m)   Wt 54 kg (119 lb)   BMI 18 64 kg/m²   General: well developed and well nourished, alert, oriented times 3 and appears comfortable  Psychiatric: Normal  HEENT: Trachea Midline, No torticollis  Cardiovascular: No discernable arrhythmia  Pulmonary: No wheezing or stridor  Skin: No masses, erythema, lacerations, fluctation, ulcerations  Neurovascular: Sensation Intact to the Median, Ulnar, Radial Nerve, Motor Intact to the Median, Ulnar, Radial Nerve and Pulses Intact    MUSCULOSKELETAL EXAMINATION:  RIGHT SIDE:  Yadi Lot:  Tenderness Radial Styloid and Positive Finkelstein's Test    _____________________________________________________  STUDIES REVIEWED:  No Studies to review      PROCEDURES PERFORMED:  Hand/upper extremity injection: R extensor compartment 1  Universal Protocol:  Consent: Verbal consent obtained  Risks and benefits: risks, benefits and alternatives were discussed  Consent given by: patient  Time out: Immediately prior to procedure a "time out" was called to verify the correct patient, procedure, equipment, support staff and site/side marked as required    Timeout called at: 2/22/2021 4:20 PM   Supporting Documentation  Indications: diagnostic and therapeutic   Procedure Details  Condition:de Quervain's tenosynovitis Site: R extensor compartment 1   Needle size: 25 G  Medications administered: 6 mg betamethasone acetate-betamethasone sodium phosphate 6 (3-3) mg/mL; 1 mL lidocaine 1 %    Patient tolerance: patient tolerated the procedure well with no immediate complications  Dressing:  Sterile dressing applied              Scribe Attestation    I,:  Prabhakar Marie PA-C am acting as a scribe while in the presence of the attending physician :       I,:  Felipa Rich MD personally performed the services described in this documentation    as scribed in my presence :           Scribe Attestation    I,:  Prabhakar Marie PA-C am acting as a scribe while in the presence of the attending physician :       I,:  Feilpa Rich MD personally performed the services described in this documentation    as scribed in my presence :

## 2021-04-11 ENCOUNTER — HOSPITAL ENCOUNTER (OUTPATIENT)
Dept: MAMMOGRAPHY | Facility: IMAGING CENTER | Age: 61
Discharge: HOME | End: 2021-04-11
Payer: COMMERCIAL

## 2021-04-11 VITALS — BODY MASS INDEX: 19.29 KG/M2 | HEIGHT: 66 IN | WEIGHT: 120 LBS

## 2021-04-11 DIAGNOSIS — Z12.31 VISIT FOR SCREENING MAMMOGRAM: ICD-10-CM

## 2021-04-11 DIAGNOSIS — Z12.31 SCREENING MAMMOGRAM, ENCOUNTER FOR: ICD-10-CM

## 2021-04-11 PROCEDURE — 77063 BREAST TOMOSYNTHESIS BI: CPT

## 2021-04-11 PROCEDURE — 77067 SCR MAMMO BI INCL CAD: CPT

## 2021-04-19 ENCOUNTER — OFFICE VISIT (OUTPATIENT)
Dept: OBGYN CLINIC | Facility: CLINIC | Age: 61
End: 2021-04-19
Payer: COMMERCIAL

## 2021-04-19 VITALS
DIASTOLIC BLOOD PRESSURE: 68 MMHG | WEIGHT: 117 LBS | HEIGHT: 66 IN | SYSTOLIC BLOOD PRESSURE: 120 MMHG | BODY MASS INDEX: 18.8 KG/M2

## 2021-04-19 DIAGNOSIS — M18.12 ARTHRITIS OF CARPOMETACARPAL (CMC) JOINT OF LEFT THUMB: ICD-10-CM

## 2021-04-19 DIAGNOSIS — G56.03 CARPAL TUNNEL SYNDROME, BILATERAL: ICD-10-CM

## 2021-04-19 DIAGNOSIS — M65.4 DE QUERVAIN'S TENOSYNOVITIS, RIGHT: Primary | ICD-10-CM

## 2021-04-19 PROCEDURE — 3008F BODY MASS INDEX DOCD: CPT | Performed by: ORTHOPAEDIC SURGERY

## 2021-04-19 PROCEDURE — 99213 OFFICE O/P EST LOW 20 MIN: CPT | Performed by: ORTHOPAEDIC SURGERY

## 2021-04-19 NOTE — PROGRESS NOTES
ASSESSMENT/PLAN:    Assessment:   de Quervain stenosis tenosynovitis  Right  Bilateral CTS  L thumb CMC arthritis     Plan:   Patient may continue her nighttime splints as needed at night time for her carpal tunnels  She will continue to monitor her dequervains or thumb cmc arthritis  She will give us a call if her symptoms improve  Follow Up:  PRN    To Do Next Visit:       General Discussions:     CMC Arthritis: The anatomy and physiology of carpometacarpal joint arthritis was discussed with the patient today in the office  Deterioration of the articular cartilage eventually leads to hypermobility at the thumb [facility], resulting in joint subluxation, osteophyte formation, cystic changes within the trapezium and base of the first metacarpal, as well as subchondral sclerosis  Eventually, pain, limited mobility, and compensatory hyperextension at the metacarpophalangeal joint may develop  While normal activity and usage of the thumb joint may provide a painful experience to the patient, this typically does not result in damage to the thumb or hand  Treatment options include resting thumb spica splints to decreased joint edema, pain, and inflammation  Therapy exercises to strengthen the thenar musculature may relieve pain, but do not alter the overall continued development of osteoarthritis  Oral medications, topical medications, corticosteroid injections may decrease pain and increase overall function  Eventually, approximately 5% of patients may require surgical intervention  Kev Enriquez Tenosynovitis: The anatomy and physiology of de Quervain's tenosynovitis was discussed with the patient today in the office  Edema and increased contact pressure within the first dorsal extensor compartment at the radial styloid can cause pain, crepitation, and limitation of function    Treatment options include resting thumb spica splints to decrease edema, oral anti-inflammatory medications, home or formal therapy exercises, up to 2 steroid injections within the first dorsal extensor compartment, or surgical release  While majority of patients do respond to conservative treatment, up to 20% may require surgical release  Operative Discussions:       _____________________________________________________  CHIEF COMPLAINT:  Chief Complaint   Patient presents with    Left Hand - Follow-up     BL carpal and cubital tunnel   BL CMC   Right Gwendolyn Daily- 1st injection 2/22/21    Right Hand - Follow-up         SUBJECTIVE:  Tracy Ronquillo is a 56 y o  female who presents for follow up regarding de Quervain stenosis tenosynovitis  Right bilateral CTS and L thumb CMC arthritis  Since last visit, Tracy Ronquillo has tried steroid injections into her right first dorsal compartment with relief and splints of her bilateral wrists  Today the patient does report occasional tingling into her bilateral hands  She states that she does wear wrist splint at nighttime which do help her with her symptoms  She notices great improvements in her symptoms since her last visit and Is happy with her progress    Radiation: None  Associated symptoms: No Complaints    PAST MEDICAL HISTORY:  Past Medical History:   Diagnosis Date    No known health problems     Septic arthritis ([address] )        PAST SURGICAL HISTORY:  Past Surgical History:   Procedure Laterality Date    HYSTERECTOMY      OOPHORECTOMY      SHOULDER ARTHROSCOPY Left 12/3/2019    Procedure: ARTHROSCOPY SHOULDER;  Surgeon: Kb Crisostomo MD;  Location[facility];  Service: Orthopedics       FAMILY HISTORY:  Family History   Adopted: Yes   Problem Relation Age of Onset    No Known Problems Daughter     No Known Problems Daughter     No Known Problems Daughter     No Known Problems Daughter        SOCIAL HISTORY:  Social History     Tobacco Use    Smoking status: Current Every Day Smoker     Packs/day: 0 50    Smokeless tobacco: Never Used   Substance Use Topics    Alcohol use: Not Currently    Drug use: No       MEDICATIONS:    Current Outpatient Medications:     Naproxen Sodium (ALEVE PO), Take by mouth, Disp: , Rfl:     acetaminophen (TYLENOL) 325 mg tablet, Take 2 tablets (650 mg total) by mouth every 6 (six) hours (Patient not taking: Reported on 10/5/2020), Disp: 30 tablet, Rfl: 0    predniSONE 20 mg tablet, 1 tab twice daily for 5 days, then 1 tab daily for 5 days  (Patient not taking: Reported on 9/29/2020), Disp: 15 tablet, Rfl: 0    ALLERGIES:  Allergies   Allergen Reactions    Iodinated Diagnostic Agents Hives    Penicillins Hives       REVIEW OF SYSTEMS:  Pertinent items are noted in HPI      LABS:  HgA1c: No results found for: HGBA1C  BMP:   Lab Results   Component Value Date    CALCIUM 8 5 02/24/2020    K 5 1 09/12/2020    CO2 27 09/12/2020     09/12/2020    BUN 14 09/12/2020    CREATININE 0 80 09/12/2020           _____________________________________________________  PHYSICAL EXAMINATION:  Vital signs: /68   Ht 5' 6" (1 676 m)   Wt 53 1 kg (117 lb)   BMI 18 88 kg/m²   General: well developed and well nourished, alert, oriented times 3 and appears comfortable  Psychiatric: Normal  HEENT: Trachea Midline, No torticollis  Cardiovascular: No discernable arrhythmia  Pulmonary: No wheezing or stridor  Skin: No Erythema  Neurovascular: Sensation Intact to the Median, Ulnar, Radial Nerve, Motor Intact to the Median, Ulnar, Radial Nerve and Pulses Intact    MUSCULOSKELETAL EXAMINATION:  LEFT SIDE:  CMC: No Instability, Positive tendnerness CMC and no hyperextension, negative finkelstein and Carpal tunnel:  No weakness APB and Postive Tinel's  RIGHT SIDE:  Carpal tunnel:  No weakness APB and Postive Tinel's and De Quervain:  Negative finkelstein no ttp thumb cmc jt    _____________________________________________________  STUDIES REVIEWED:  No Studies to review      PROCEDURES PERFORMED:  Procedures  No Procedures performed today   Scribe Attestation I,:  Leydi Cee am acting as a scribe while in the presence of the attending physician :       I,:  Merle Forrester MD personally performed the services described in this documentation    as scribed in my presence :

## 2021-05-03 ENCOUNTER — HOSPITAL ENCOUNTER (OUTPATIENT)
Dept: CT IMAGING | Facility: HOSPITAL | Age: 61
Discharge: HOME | End: 2021-05-03
Payer: COMMERCIAL

## 2021-05-03 DIAGNOSIS — F17.200 ENCOUNTER FOR SCREENING FOR MALIGNANT NEOPLASM OF LUNG IN CURRENT SMOKER WITH 30 PACK YEAR HISTORY OR GREATER: ICD-10-CM

## 2021-05-03 DIAGNOSIS — Z12.2 ENCOUNTER FOR SCREENING FOR MALIGNANT NEOPLASM OF LUNG IN CURRENT SMOKER WITH 30 PACK YEAR HISTORY OR GREATER: ICD-10-CM

## 2021-05-03 PROCEDURE — 71271 CT THORAX LUNG CANCER SCR C-: CPT

## 2021-05-10 ENCOUNTER — TELEPHONE (OUTPATIENT)
Dept: FAMILY MEDICINE CLINIC | Facility: CLINIC | Age: 61
End: 2021-05-10

## 2021-05-10 NOTE — TELEPHONE ENCOUNTER
----- Message from Alexia Allred MD sent at 5/10/2021  2:09 PM EDT -----  Call patient with lab result- report as above- multiple nodules , no cancer- repeat 1 year

## 2021-10-14 ENCOUNTER — APPOINTMENT (EMERGENCY)
Dept: RADIOLOGY | Facility: HOSPITAL | Age: 61
End: 2021-10-14
Payer: COMMERCIAL

## 2021-10-14 ENCOUNTER — HOSPITAL ENCOUNTER (EMERGENCY)
Facility: HOSPITAL | Age: 61
Discharge: HOME | End: 2021-10-14
Payer: COMMERCIAL

## 2021-10-14 VITALS
DIASTOLIC BLOOD PRESSURE: 51 MMHG | RESPIRATION RATE: 20 BRPM | HEIGHT: 66 IN | HEART RATE: 62 BPM | BODY MASS INDEX: 18.8 KG/M2 | OXYGEN SATURATION: 99 % | SYSTOLIC BLOOD PRESSURE: 151 MMHG | WEIGHT: 117 LBS | TEMPERATURE: 98 F

## 2021-10-14 DIAGNOSIS — M25.532 LEFT WRIST PAIN: Primary | ICD-10-CM

## 2021-10-14 PROCEDURE — 96372 THER/PROPH/DIAG INJ SC/IM: CPT

## 2021-10-14 PROCEDURE — 73110 X-RAY EXAM OF WRIST: CPT

## 2021-10-14 PROCEDURE — 99284 EMERGENCY DEPT VISIT MOD MDM: CPT | Performed by: PHYSICIAN ASSISTANT

## 2021-10-14 PROCEDURE — 99283 EMERGENCY DEPT VISIT LOW MDM: CPT

## 2021-10-14 RX ORDER — OXYCODONE HYDROCHLORIDE 5 MG/1
5 TABLET ORAL ONCE
Status: COMPLETED | OUTPATIENT
Start: 2021-10-14 | End: 2021-10-14

## 2021-10-14 RX ORDER — OXYCODONE HYDROCHLORIDE 5 MG/1
5 TABLET ORAL EVERY 6 HOURS PRN
Qty: 6 TABLET | Refills: 0 | Status: SHIPPED | OUTPATIENT
Start: 2021-10-14 | End: 2021-10-24

## 2021-10-14 RX ORDER — NAPROXEN 500 MG/1
500 TABLET ORAL 2 TIMES DAILY WITH MEALS
Qty: 30 TABLET | Refills: 0 | Status: SHIPPED | OUTPATIENT
Start: 2021-10-14 | End: 2021-12-21 | Stop reason: ALTCHOICE

## 2021-10-14 RX ORDER — KETOROLAC TROMETHAMINE 30 MG/ML
15 INJECTION, SOLUTION INTRAMUSCULAR; INTRAVENOUS ONCE
Status: COMPLETED | OUTPATIENT
Start: 2021-10-14 | End: 2021-10-14

## 2021-10-14 RX ADMIN — OXYCODONE HYDROCHLORIDE 5 MG: 5 TABLET ORAL at 18:45

## 2021-10-14 RX ADMIN — KETOROLAC TROMETHAMINE 15 MG: 30 INJECTION, SOLUTION INTRAMUSCULAR at 18:44

## 2021-10-18 ENCOUNTER — OFFICE VISIT (OUTPATIENT)
Dept: OBGYN CLINIC | Facility: HOSPITAL | Age: 61
End: 2021-10-18
Payer: COMMERCIAL

## 2021-10-18 VITALS
HEART RATE: 55 BPM | DIASTOLIC BLOOD PRESSURE: 77 MMHG | BODY MASS INDEX: 18.8 KG/M2 | HEIGHT: 66 IN | SYSTOLIC BLOOD PRESSURE: 124 MMHG | WEIGHT: 117 LBS

## 2021-10-18 DIAGNOSIS — M18.12 ARTHRITIS OF CARPOMETACARPAL (CMC) JOINT OF LEFT THUMB: ICD-10-CM

## 2021-10-18 DIAGNOSIS — G56.01 CARPAL TUNNEL SYNDROME ON RIGHT: ICD-10-CM

## 2021-10-18 DIAGNOSIS — G56.02 CARPAL TUNNEL SYNDROME OF LEFT WRIST: Primary | ICD-10-CM

## 2021-10-18 DIAGNOSIS — M65.4 DE QUERVAIN'S TENOSYNOVITIS, RIGHT: ICD-10-CM

## 2021-10-18 PROCEDURE — 99214 OFFICE O/P EST MOD 30 MIN: CPT | Performed by: ORTHOPAEDIC SURGERY

## 2021-10-18 PROCEDURE — 3008F BODY MASS INDEX DOCD: CPT | Performed by: ORTHOPAEDIC SURGERY

## 2021-10-18 PROCEDURE — 4004F PT TOBACCO SCREEN RCVD TLK: CPT | Performed by: ORTHOPAEDIC SURGERY

## 2021-10-18 RX ORDER — CEFAZOLIN SODIUM 2 G/50ML
2000 SOLUTION INTRAVENOUS ONCE
Status: CANCELLED | OUTPATIENT
Start: 2021-10-18 | End: 2021-10-18

## 2021-10-18 RX ORDER — METHYLPREDNISOLONE 4 MG/1
TABLET ORAL
Qty: 1 EACH | Refills: 0 | Status: SHIPPED | OUTPATIENT
Start: 2021-10-18 | End: 2021-12-21 | Stop reason: ALTCHOICE

## 2021-11-09 ENCOUNTER — TELEPHONE (OUTPATIENT)
Dept: OBGYN CLINIC | Facility: HOSPITAL | Age: 61
End: 2021-11-09

## 2021-12-20 ENCOUNTER — PREP FOR PROCEDURE (OUTPATIENT)
Dept: OBGYN CLINIC | Facility: CLINIC | Age: 61
End: 2021-12-20

## 2021-12-22 ENCOUNTER — ANESTHESIA EVENT (OUTPATIENT)
Dept: PERIOP | Facility: HOSPITAL | Age: 61
End: 2021-12-22
Payer: COMMERCIAL

## 2021-12-23 ENCOUNTER — ANESTHESIA (OUTPATIENT)
Dept: PERIOP | Facility: HOSPITAL | Age: 61
End: 2021-12-23
Payer: COMMERCIAL

## 2021-12-23 ENCOUNTER — HOSPITAL ENCOUNTER (OUTPATIENT)
Facility: HOSPITAL | Age: 61
Setting detail: OUTPATIENT SURGERY
Discharge: HOME | End: 2021-12-23
Attending: ORTHOPAEDIC SURGERY | Admitting: ORTHOPAEDIC SURGERY
Payer: COMMERCIAL

## 2021-12-23 VITALS
RESPIRATION RATE: 18 BRPM | OXYGEN SATURATION: 96 % | SYSTOLIC BLOOD PRESSURE: 146 MMHG | TEMPERATURE: 97.8 F | HEART RATE: 54 BPM | WEIGHT: 113.2 LBS | BODY MASS INDEX: 18.27 KG/M2 | DIASTOLIC BLOOD PRESSURE: 85 MMHG

## 2021-12-23 DIAGNOSIS — G56.02 CARPAL TUNNEL SYNDROME OF LEFT WRIST: Primary | ICD-10-CM

## 2021-12-23 PROCEDURE — 99024 POSTOP FOLLOW-UP VISIT: CPT | Performed by: PHYSICIAN ASSISTANT

## 2021-12-23 PROCEDURE — 29848 WRIST ENDOSCOPY/SURGERY: CPT | Performed by: ORTHOPAEDIC SURGERY

## 2021-12-23 PROCEDURE — NC001 PR NO CHARGE: Performed by: PHYSICIAN ASSISTANT

## 2021-12-23 RX ORDER — FENTANYL CITRATE 50 UG/ML
INJECTION, SOLUTION INTRAMUSCULAR; INTRAVENOUS AS NEEDED
Status: DISCONTINUED | OUTPATIENT
Start: 2021-12-23 | End: 2021-12-23

## 2021-12-23 RX ORDER — MAGNESIUM HYDROXIDE 1200 MG/15ML
LIQUID ORAL AS NEEDED
Status: DISCONTINUED | OUTPATIENT
Start: 2021-12-23 | End: 2021-12-23 | Stop reason: HOSPADM

## 2021-12-23 RX ORDER — SENNOSIDES 8.6 MG
650 CAPSULE ORAL EVERY 8 HOURS PRN
Qty: 30 TABLET | Refills: 0 | Status: SHIPPED | OUTPATIENT
Start: 2021-12-23 | End: 2022-01-03 | Stop reason: ALTCHOICE

## 2021-12-23 RX ORDER — LIDOCAINE HYDROCHLORIDE AND EPINEPHRINE 10; 10 MG/ML; UG/ML
INJECTION, SOLUTION INFILTRATION; PERINEURAL AS NEEDED
Status: DISCONTINUED | OUTPATIENT
Start: 2021-12-23 | End: 2021-12-23 | Stop reason: HOSPADM

## 2021-12-23 RX ORDER — NAPROXEN SODIUM 220 MG
220 TABLET ORAL 2 TIMES DAILY WITH MEALS
Qty: 20 TABLET | Refills: 0 | Status: SHIPPED | OUTPATIENT
Start: 2021-12-23

## 2021-12-23 RX ORDER — CEFAZOLIN SODIUM 1 G/50ML
SOLUTION INTRAVENOUS AS NEEDED
Status: DISCONTINUED | OUTPATIENT
Start: 2021-12-23 | End: 2021-12-23

## 2021-12-23 RX ORDER — LIDOCAINE HYDROCHLORIDE 10 MG/ML
INJECTION, SOLUTION EPIDURAL; INFILTRATION; INTRACAUDAL; PERINEURAL AS NEEDED
Status: DISCONTINUED | OUTPATIENT
Start: 2021-12-23 | End: 2021-12-23

## 2021-12-23 RX ORDER — ONDANSETRON 2 MG/ML
4 INJECTION INTRAMUSCULAR; INTRAVENOUS ONCE AS NEEDED
Status: DISCONTINUED | OUTPATIENT
Start: 2021-12-23 | End: 2021-12-23 | Stop reason: HOSPADM

## 2021-12-23 RX ORDER — LIDOCAINE HYDROCHLORIDE 10 MG/ML
0.5 INJECTION, SOLUTION EPIDURAL; INFILTRATION; INTRACAUDAL; PERINEURAL ONCE AS NEEDED
Status: DISCONTINUED | OUTPATIENT
Start: 2021-12-23 | End: 2021-12-23 | Stop reason: HOSPADM

## 2021-12-23 RX ORDER — PROPOFOL 10 MG/ML
INJECTION, EMULSION INTRAVENOUS CONTINUOUS PRN
Status: DISCONTINUED | OUTPATIENT
Start: 2021-12-23 | End: 2021-12-23

## 2021-12-23 RX ORDER — SODIUM CHLORIDE, SODIUM LACTATE, POTASSIUM CHLORIDE, CALCIUM CHLORIDE 600; 310; 30; 20 MG/100ML; MG/100ML; MG/100ML; MG/100ML
125 INJECTION, SOLUTION INTRAVENOUS CONTINUOUS
Status: DISCONTINUED | OUTPATIENT
Start: 2021-12-23 | End: 2021-12-23 | Stop reason: HOSPADM

## 2021-12-23 RX ORDER — CEFAZOLIN SODIUM 2 G/50ML
SOLUTION INTRAVENOUS AS NEEDED
Status: DISCONTINUED | OUTPATIENT
Start: 2021-12-23 | End: 2021-12-23

## 2021-12-23 RX ORDER — CEFAZOLIN SODIUM 2 G/50ML
2000 SOLUTION INTRAVENOUS ONCE
Status: DISCONTINUED | OUTPATIENT
Start: 2021-12-23 | End: 2021-12-23 | Stop reason: HOSPADM

## 2021-12-23 RX ORDER — MIDAZOLAM HYDROCHLORIDE 2 MG/2ML
INJECTION, SOLUTION INTRAMUSCULAR; INTRAVENOUS AS NEEDED
Status: DISCONTINUED | OUTPATIENT
Start: 2021-12-23 | End: 2021-12-23

## 2021-12-23 RX ORDER — HYDROCODONE BITARTRATE AND ACETAMINOPHEN 5; 325 MG/1; MG/1
1 TABLET ORAL EVERY 6 HOURS PRN
Qty: 5 TABLET | Refills: 0 | Status: SHIPPED | OUTPATIENT
Start: 2021-12-23 | End: 2021-12-28

## 2021-12-23 RX ORDER — FENTANYL CITRATE/PF 50 MCG/ML
25 SYRINGE (ML) INJECTION
Status: DISCONTINUED | OUTPATIENT
Start: 2021-12-23 | End: 2021-12-23 | Stop reason: HOSPADM

## 2021-12-23 RX ADMIN — LIDOCAINE HYDROCHLORIDE 50 MG: 10 INJECTION, SOLUTION EPIDURAL; INFILTRATION; INTRACAUDAL; PERINEURAL at 11:29

## 2021-12-23 RX ADMIN — SODIUM CHLORIDE, SODIUM LACTATE, POTASSIUM CHLORIDE, AND CALCIUM CHLORIDE 125 ML/HR: .6; .31; .03; .02 INJECTION, SOLUTION INTRAVENOUS at 11:03

## 2021-12-23 RX ADMIN — CEFAZOLIN SODIUM 2000 MG: 2 SOLUTION INTRAVENOUS at 11:42

## 2021-12-23 RX ADMIN — PROPOFOL 100 MCG/KG/MIN: 10 INJECTION, EMULSION INTRAVENOUS at 11:29

## 2021-12-23 RX ADMIN — MIDAZOLAM 2 MG: 1 INJECTION INTRAMUSCULAR; INTRAVENOUS at 11:28

## 2021-12-23 RX ADMIN — FENTANYL CITRATE 50 MCG: 50 INJECTION, SOLUTION INTRAMUSCULAR; INTRAVENOUS at 11:30

## 2022-01-03 ENCOUNTER — OFFICE VISIT (OUTPATIENT)
Dept: OBGYN CLINIC | Facility: CLINIC | Age: 62
End: 2022-01-03

## 2022-01-03 VITALS
WEIGHT: 113 LBS | SYSTOLIC BLOOD PRESSURE: 148 MMHG | BODY MASS INDEX: 18.16 KG/M2 | HEIGHT: 66 IN | DIASTOLIC BLOOD PRESSURE: 86 MMHG

## 2022-01-03 DIAGNOSIS — Z98.890 STATUS POST ENDOSCOPIC CARPAL TUNNEL RELEASE: Primary | ICD-10-CM

## 2022-01-03 PROCEDURE — 99024 POSTOP FOLLOW-UP VISIT: CPT | Performed by: ORTHOPAEDIC SURGERY

## 2022-01-03 NOTE — PROGRESS NOTES
Assessment:   S/P Left endoscopic carpal tunnel release - Left on 12/23/2021    Plan:   {kmtreatments:92598}    Follow Up:  {follow up time choices:85193}    To Do Next Visit:  {To do next visit:01294::" "}      CHIEF COMPLAINT:  Chief Complaint   Patient presents with    Left Wrist - Post-op, Suture / Staple Removal     S/P ECTR DOS 12/23/21         SUBJECTIVE:  Hernandez Leavitt is a 59 y o  female who presents for follow up after Left endoscopic carpal tunnel release - Left on 12/23/2021  Today patient has {Complaint:36856}  PHYSICAL EXAMINATION:  Vital signs: There were no vitals taken for this visit    General: well developed and well nourished, alert, oriented times 3 and appears comfortable  Psychiatric: Normal    MUSCULOSKELETAL EXAMINATION:  Incision: {post op incision:82904}  Range of Motion: {post op rom:20109::"As expected"}  Neurovascular status: {post op neuro exam:59311::"Neuro intact, good cap refill"}  Activity Restrictions: {post op activity:28276::"No restrictions"}  {anything done today?:41875}      STUDIES REVIEWED:  No Studies to review      PROCEDURES PERFORMED:  Procedures  {Was Procdoc done:89179::"No Procedures performed today"}     Scribe Attestation    I,:  Lizbeth Martinez am acting as a scribe while in the presence of the attending physician :       I,:  Mikayla Pena MD personally performed the services described in this documentation    as scribed in my presence :

## 2022-01-03 NOTE — LETTER
January 3, 2022     Patient: Keira Wilson   YOB: 1960   Date of Visit: 1/3/2022       To Whom it May Concern:    Cornel Parsons is under my professional care  She was seen in my office on 1/3/2022  She may return to work as of 1/17/2022  If you have any questions or concerns, please don't hesitate to call           Sincerely,          Roderick Fan MD        CC: No Recipients

## 2022-01-03 NOTE — PROGRESS NOTES
Assessment:   S/P Left endoscopic carpal tunnel release - Left on 12/23/2021    Plan:   Sutures removed, steri-strips applied  Gradual return to normal adult activities  Return to work note provided for 01/17/2022, her leave from work may be extended to the beginning of February if she wishes and will call if indicated  Follow Up:  PRN    To Do Next Visit:  Advised to call      CHIEF COMPLAINT:  Chief Complaint   Patient presents with    Left Wrist - Post-op, Suture / Staple Removal     S/P ECTR DOS 12/23/21         SUBJECTIVE:  Ana Arroyo is a 59 y o  female who presents for follow up after Left endoscopic carpal tunnel release - Left on 12/23/2021  Today patient has discomfort and soreness  Her numbness/tingling has improved  She is a          PHYSICAL EXAMINATION:  Vital signs: /86   Ht 5' 6" (1 676 m)   Wt 51 3 kg (113 lb)   BMI 18 24 kg/m²   General: well developed and well nourished, alert, oriented times 3 and appears comfortable  Psychiatric: Normal    MUSCULOSKELETAL EXAMINATION:  Incision: Clean, dry, intact  Range of Motion: As expected  Neurovascular status: Neuro intact, good cap refill  Activity Restrictions: No restrictions         STUDIES REVIEWED:  No Studies to review      PROCEDURES PERFORMED:  Procedures  No Procedures performed today   Scribe Attestation    I,:  Efrain Helm am acting as a scribe while in the presence of the attending physician :       I,:  Hannah Yun MD personally performed the services described in this documentation    as scribed in my presence :

## 2023-02-06 ENCOUNTER — OFFICE VISIT (OUTPATIENT)
Dept: FAMILY MEDICINE CLINIC | Facility: CLINIC | Age: 63
End: 2023-02-06

## 2023-02-06 VITALS
HEART RATE: 88 BPM | BODY MASS INDEX: 19.78 KG/M2 | RESPIRATION RATE: 16 BRPM | OXYGEN SATURATION: 97 % | DIASTOLIC BLOOD PRESSURE: 82 MMHG | SYSTOLIC BLOOD PRESSURE: 122 MMHG | HEIGHT: 67 IN | WEIGHT: 126 LBS

## 2023-02-06 DIAGNOSIS — F17.210 CIGARETTE NICOTINE DEPENDENCE WITHOUT COMPLICATION: ICD-10-CM

## 2023-02-06 DIAGNOSIS — Z12.2 ENCOUNTER FOR SCREENING FOR LUNG CANCER: ICD-10-CM

## 2023-02-06 DIAGNOSIS — J43.8 OTHER EMPHYSEMA (HCC): Primary | ICD-10-CM

## 2023-02-06 DIAGNOSIS — M25.50 ARTHRALGIA, UNSPECIFIED JOINT: ICD-10-CM

## 2023-02-06 DIAGNOSIS — R00.2 INTERMITTENT PALPITATIONS: ICD-10-CM

## 2023-02-06 DIAGNOSIS — R06.02 SOB (SHORTNESS OF BREATH): ICD-10-CM

## 2023-02-06 DIAGNOSIS — Z12.31 ENCOUNTER FOR SCREENING MAMMOGRAM FOR BREAST CANCER: ICD-10-CM

## 2023-02-06 RX ORDER — BUDESONIDE AND FORMOTEROL FUMARATE DIHYDRATE 160; 4.5 UG/1; UG/1
2 AEROSOL RESPIRATORY (INHALATION) 2 TIMES DAILY
Qty: 10.2 G | Refills: 1 | Status: SHIPPED | OUTPATIENT
Start: 2023-02-06

## 2023-02-06 NOTE — PROGRESS NOTES
[id number]        NAME: Brenda Joya is a 57 y o  female  : 1960    MRN: [id number]  DATE: 2023  TIME: 12:31 PM    Assessment and Plan   Other emphysema ([address] ) [J43 8]  1  Other emphysema (HCC)  CBC and differential    budesonide-formoterol (Symbicort) 160-4 5 mcg/act inhaler    CBC and differential      2  SOB (shortness of breath)  POCT ECG    budesonide-formoterol (Symbicort) 160-4 5 mcg/act inhaler      3  Intermittent palpitations  Comprehensive metabolic panel    TSH, 3rd generation with Free T4 reflex    POCT ECG    Holter monitor    Comprehensive metabolic panel    TSH, 3rd generation with Free T4 reflex      4  Encounter for screening mammogram for breast cancer  Mammo screening bilateral w 3d & cad      5  Cigarette nicotine dependence without complication        6  Encounter for screening for lung cancer  CT lung screening program      7  Arthralgia, unspecified joint  CBC and differential    Comprehensive metabolic panel    C-reactive protein    Lyme Antibody Profile with reflex to WB    Uric acid    MARK w/Reflex if Positive    CBC and differential    Comprehensive metabolic panel    C-reactive protein    Lyme Antibody Profile with reflex to WB    Uric acid    MARK w/Reflex if Positive          Cigarette nicotine dependence without complication  Has been smoking half pack cigarettes per day  Has cut back recently  Patient Instructions     Patient Instructions   Get labs as ordered  EKG in office today  EKG shows sinus bradycardia with sinus arrhythmia  Order Holter monitor due to intermittent palpitation with rapid heart rate  Get mammogram as ordered  Start Symbicort 2 inhalations twice daily for probable emphysema/COPD            Chief Complaint     Chief Complaint   Patient presents with   • Physical Exam     Discuss SOB x 3 weeks         History of Present Illness       Patient comes in today for evaluation of increasing shortness of breath over the last 2 to 3 weeks  No acute illness  Chart review shows chest x-ray 2019 moderate emphysema  Also lung cancer screening CT 2021 showed multiple lung nodules along with emphysema  Has used an inhaler in the past, she is uncertain exact circumstances  No current medication  Review of Systems   Review of Systems   Constitutional: Negative for appetite change, chills, diaphoresis and fever  HENT: Negative for congestion, ear pain, rhinorrhea, sinus pressure and sore throat  Eyes: Negative for discharge, redness and itching  Respiratory: Positive for cough and shortness of breath  Negative for wheezing  Cardiovascular: Negative for chest pain, palpitations and leg swelling  Rapid or slow heart rate   Gastrointestinal: Negative for diarrhea, nausea and vomiting  Musculoskeletal: Positive for arthralgias and joint swelling           Current Medications       Current Outpatient Medications:   •  budesonide-formoterol (Symbicort) 160-4 5 mcg/act inhaler, Inhale 2 puffs 2 (two) times a day Rinse mouth after use , Disp: 10 2 g, Rfl: 1    Current Allergies     Allergies as of 02/06/2023 - Reviewed 02/06/2023   Allergen Reaction Noted   • Iodinated contrast media Hives 12/19/2013   • Penicillins Hives 12/19/2013            The following portions of the patient's history were reviewed and updated as appropriate: allergies, current medications, past family history, past medical history, past social history, past surgical history and problem list      Past Medical History:   Diagnosis Date   • Carpal tunnel syndrome    • No known health problems    • Septic arthritis ([facility] [address] )        Past Surgical History:   Procedure Laterality Date   • COLONOSCOPY     • HYSTERECTOMY     • OOPHORECTOMY     • MN NDSC WRST SURG W/RLS TRANSVRS CARPL LIGM Left 12/23/2021    Procedure: Left endoscopic carpal tunnel release;  Surgeon: Beth Renteria MD;  Location:  MAIN OR;  Service: Orthopedics   • SHOULDER ARTHROSCOPY Left 12/3/2019    Procedure: ARTHROSCOPY SHOULDER;  Surgeon: Kiera Quinones MD;  Location:  MAIN OR;  Service: Orthopedics       Family History   Adopted: Yes   Problem Relation Age of Onset   • No Known Problems Daughter    • No Known Problems Daughter    • No Known Problems Daughter    • No Known Problems Daughter          Medications have been verified  Objective   /82 (BP Location: Left arm, Patient Position: Sitting, Cuff Size: Standard)   Pulse 88   Resp 16   Ht 5' 7" (1 702 m)   Wt 57 2 kg (126 lb)   SpO2 97%   BMI 19 73 kg/m²        Physical Exam     Physical Exam  Vitals reviewed  Constitutional:       General: She is not in acute distress  Appearance: She is well-developed  She is not diaphoretic  HENT:      Head: Normocephalic and atraumatic  Right Ear: Tympanic membrane, ear canal and external ear normal       Left Ear: Tympanic membrane, ear canal and external ear normal       Nose: Nose normal  No mucosal edema or rhinorrhea  Right Sinus: No maxillary sinus tenderness  Left Sinus: No maxillary sinus tenderness  Mouth/Throat:      Mouth: Mucous membranes are not pale and not dry  Dentition: Normal dentition  Pharynx: Uvula midline  No oropharyngeal exudate  Eyes:      General:         Right eye: No discharge  Left eye: No discharge  Extraocular Movements: Extraocular movements intact  Conjunctiva/sclera: Conjunctivae normal       Pupils: Pupils are equal, round, and reactive to light  Neck:      Thyroid: No thyromegaly  Cardiovascular:      Rate and Rhythm: Normal rate  Rhythm irregularly irregular  Heart sounds: Normal heart sounds  No murmur heard  Pulmonary:      Effort: Pulmonary effort is normal  No respiratory distress  Breath sounds: Examination of the right-lower field reveals decreased breath sounds  Examination of the left-lower field reveals decreased breath sounds   Decreased breath sounds present  No wheezing or rales  Musculoskeletal:      Cervical back: Normal range of motion and neck supple  Right lower leg: No edema  Left lower leg: No edema  Lymphadenopathy:      Cervical: No cervical adenopathy  Skin:     Findings: No rash  Neurological:      Mental Status: She is alert and oriented to person, place, and time  Cranial Nerves: No cranial nerve deficit     Psychiatric:         Mood and Affect: Mood normal          Behavior: Behavior normal

## 2023-02-06 NOTE — PATIENT INSTRUCTIONS
Get labs as ordered  EKG in office today  EKG shows sinus bradycardia with sinus arrhythmia  Order Holter monitor due to intermittent palpitation with rapid heart rate  Get mammogram as ordered  Start Symbicort 2 inhalations twice daily for probable emphysema/COPD

## 2023-02-17 ENCOUNTER — HOSPITAL ENCOUNTER (OUTPATIENT)
Dept: NON INVASIVE DIAGNOSTICS | Age: 63
Discharge: HOME | End: 2023-02-17

## 2023-02-17 DIAGNOSIS — R00.2 INTERMITTENT PALPITATIONS: ICD-10-CM

## 2023-02-18 LAB
ALBUMIN SERPL-MCNC: 4.1 G/DL (ref 3.8–4.8)
ALBUMIN/GLOB SERPL: 2 {RATIO} (ref 1.2–2.2)
ALP SERPL-CCNC: 95 IU/L (ref 44–121)
ALT SERPL-CCNC: 9 IU/L (ref 0–32)
ANA SER QL: NEGATIVE
AST SERPL-CCNC: 14 IU/L (ref 0–40)
BASOPHILS # BLD AUTO: 0.1 X10E3/UL (ref 0–0.2)
BASOPHILS NFR BLD AUTO: 1 %
BILIRUB SERPL-MCNC: 0.3 MG/DL (ref 0–1.2)
BUN SERPL-MCNC: 9 MG/DL (ref 8–27)
BUN/CREAT SERPL: 11 (ref 12–28)
CALCIUM SERPL-MCNC: 9.1 MG/DL (ref 8.7–10.3)
CHLORIDE SERPL-SCNC: 101 MMOL/L (ref 96–106)
CO2 SERPL-SCNC: 24 MMOL/L (ref 20–29)
CREAT SERPL-MCNC: 0.81 MG/DL (ref 0.57–1)
CRP SERPL-MCNC: 2 MG/L (ref 0–10)
EGFR: 81 ML/MIN/1.73
EOSINOPHIL # BLD AUTO: 0.1 X10E3/UL (ref 0–0.4)
EOSINOPHIL NFR BLD AUTO: 1 %
ERYTHROCYTE [DISTWIDTH] IN BLOOD BY AUTOMATED COUNT: 11.7 % (ref 11.7–15.4)
GLOBULIN SER-MCNC: 2.1 G/DL (ref 1.5–4.5)
GLUCOSE SERPL-MCNC: 116 MG/DL (ref 70–99)
HCT VFR BLD AUTO: 40.7 % (ref 34–46.6)
HGB BLD-MCNC: 14.1 G/DL (ref 11.1–15.9)
IMM GRANULOCYTES # BLD: 0 X10E3/UL (ref 0–0.1)
IMM GRANULOCYTES NFR BLD: 0 %
LYMPHOCYTES # BLD AUTO: 1.5 X10E3/UL (ref 0.7–3.1)
LYMPHOCYTES NFR BLD AUTO: 21 %
MCH RBC QN AUTO: 30.4 PG (ref 26.6–33)
MCHC RBC AUTO-ENTMCNC: 34.6 G/DL (ref 31.5–35.7)
MCV RBC AUTO: 88 FL (ref 79–97)
MONOCYTES # BLD AUTO: 0.5 X10E3/UL (ref 0.1–0.9)
MONOCYTES NFR BLD AUTO: 7 %
NEUTROPHILS # BLD AUTO: 4.9 X10E3/UL (ref 1.4–7)
NEUTROPHILS NFR BLD AUTO: 70 %
PLATELET # BLD AUTO: 363 X10E3/UL (ref 150–450)
POTASSIUM SERPL-SCNC: 5.1 MMOL/L (ref 3.5–5.2)
PROT SERPL-MCNC: 6.2 G/DL (ref 6–8.5)
RBC # BLD AUTO: 4.64 X10E6/UL (ref 3.77–5.28)
SODIUM SERPL-SCNC: 137 MMOL/L (ref 134–144)
TSH SERPL DL<=0.005 MIU/L-ACNC: 2.7 UIU/ML (ref 0.45–4.5)
URATE SERPL-MCNC: 3.1 MG/DL (ref 3–7.2)
WBC # BLD AUTO: 7 X10E3/UL (ref 3.4–10.8)

## 2023-02-20 ENCOUNTER — TELEPHONE (OUTPATIENT)
Dept: FAMILY MEDICINE CLINIC | Facility: CLINIC | Age: 63
End: 2023-02-20

## 2023-02-20 NOTE — TELEPHONE ENCOUNTER
Patient was fasting, patient aware ----- Message from Hilaria Lam MD sent at 2/19/2023  2:08 PM EST -----  Call patient with lab result-was this a fasting glucose?  Inflammatory markers are normal

## 2023-03-02 ENCOUNTER — TELEPHONE (OUTPATIENT)
Dept: FAMILY MEDICINE CLINIC | Facility: CLINIC | Age: 63
End: 2023-03-02

## 2023-03-02 DIAGNOSIS — I49.49 ECTOPIC HEARTBEATS: Primary | ICD-10-CM

## 2023-03-02 RX ORDER — METOPROLOL SUCCINATE 25 MG/1
25 TABLET, EXTENDED RELEASE ORAL DAILY
Qty: 30 TABLET | Refills: 1 | Status: SHIPPED | OUTPATIENT
Start: 2023-03-02

## 2023-03-02 NOTE — TELEPHONE ENCOUNTER
----- Message from Ayleen Amaya MD sent at 3/2/2023 12:28 PM EST -----  Call patient with lab result-sinus rhythm with PACs and PVCs  If these are symptomatic can be treated with beta-blocker  Spoke to patient and advised as per Dr French Myers -- she would like to try the beta blocker to be ordred at [facility]  MD Davi Ballesteros MA    Toprol-XL 25 mg once daily sent to pharmacy   Should probably have a follow-up visit in 3 to 4 weeks   Does she wish to see cardiology? Advised patient that Rx was sent to [facility] -- follow-up appointment scheduled for 3/27/23 @ 0900 ([id number]) with Dr French Myers  She would like to discuss with Dr French Myers at time of this appointment whether she needs to see Cardio    Dr French Myers made aware verbally of this

## 2023-03-06 ENCOUNTER — HOSPITAL ENCOUNTER (OUTPATIENT)
Dept: CT IMAGING | Facility: HOSPITAL | Age: 63
Discharge: HOME | End: 2023-03-06

## 2023-03-06 DIAGNOSIS — Z12.2 ENCOUNTER FOR SCREENING FOR LUNG CANCER: ICD-10-CM

## 2023-03-11 NOTE — RESULT ENCOUNTER NOTE
Call patient with lab result-radiology advises repeat CT in 6 months- put in reminder to [facility]

## 2023-03-14 DIAGNOSIS — R06.02 SOB (SHORTNESS OF BREATH): ICD-10-CM

## 2023-03-14 DIAGNOSIS — J43.8 OTHER EMPHYSEMA (HCC): ICD-10-CM

## 2023-03-14 RX ORDER — BUDESONIDE AND FORMOTEROL FUMARATE DIHYDRATE 160; 4.5 UG/1; UG/1
2 AEROSOL RESPIRATORY (INHALATION) 2 TIMES DAILY
Qty: 10.2 G | Refills: 0 | Status: SHIPPED | OUTPATIENT
Start: 2023-03-14

## 2023-03-16 ENCOUNTER — TELEPHONE (OUTPATIENT)
Dept: FAMILY MEDICINE CLINIC | Facility: CLINIC | Age: 63
End: 2023-03-16

## 2023-03-16 NOTE — TELEPHONE ENCOUNTER
Patient notified as per Dr Alvin Hadley    Follow-up reminder placed on chart       ----- Message from Marcia Goodrich MD sent at 3/11/2023  1:10 PM EST -----  Call patient with lab result-radiology advises repeat CT in 6 months- put in reminder to [facility]

## 2023-03-27 ENCOUNTER — OFFICE VISIT (OUTPATIENT)
Dept: FAMILY MEDICINE CLINIC | Facility: CLINIC | Age: 63
End: 2023-03-27

## 2023-03-27 VITALS
DIASTOLIC BLOOD PRESSURE: 80 MMHG | HEIGHT: 67 IN | BODY MASS INDEX: 20.4 KG/M2 | OXYGEN SATURATION: 99 % | WEIGHT: 130 LBS | SYSTOLIC BLOOD PRESSURE: 120 MMHG | TEMPERATURE: 97.3 F | HEART RATE: 64 BPM

## 2023-03-27 DIAGNOSIS — Z00.00 ANNUAL PHYSICAL EXAM: Primary | ICD-10-CM

## 2023-03-27 DIAGNOSIS — F17.210 CIGARETTE NICOTINE DEPENDENCE WITHOUT COMPLICATION: ICD-10-CM

## 2023-03-27 DIAGNOSIS — I49.1 PAC (PREMATURE ATRIAL CONTRACTION): ICD-10-CM

## 2023-03-27 DIAGNOSIS — J43.8 OTHER EMPHYSEMA (HCC): ICD-10-CM

## 2023-03-27 DIAGNOSIS — I49.3 FREQUENT PVCS: ICD-10-CM

## 2023-03-27 DIAGNOSIS — Z12.11 COLON CANCER SCREENING: ICD-10-CM

## 2023-03-27 RX ORDER — BUDESONIDE AND FORMOTEROL FUMARATE DIHYDRATE 160; 4.5 UG/1; UG/1
2 AEROSOL RESPIRATORY (INHALATION) 2 TIMES DAILY
Qty: 10.2 G | Refills: 5 | Status: SHIPPED | OUTPATIENT
Start: 2023-03-27

## 2023-03-27 RX ORDER — METOPROLOL SUCCINATE 25 MG/1
25 TABLET, EXTENDED RELEASE ORAL DAILY
Qty: 90 TABLET | Refills: 3 | Status: SHIPPED | OUTPATIENT
Start: 2023-03-27

## 2023-03-27 NOTE — PROGRESS NOTES
"     Subjective:   Chief Complaint   Patient presents with   • Physical Exam     Review labs        Patient ID: Concha Chang is a 57 y o  female  Patient comes in for follow-up and annual wellness exam   Recent labs were reviewed  1) COPD/emphysema-patient feels much better since using the inhaler  She has been able to cut back on her cigarette use give currently  2) cardiac arrhythmias with PACs and PVCs  Improved with low-dose beta-blocker  3) nicotine dependence-has cut back significantly on cigarette use but only 2 to 3 cigarettes today  The following portions of the patient's history were reviewed and updated as appropriate: allergies, current medications, past family history, past medical history, past social history, past surgical history and problem list     Review of Systems   Constitutional: Negative for appetite change, chills, diaphoresis and fever  HENT: Negative for congestion, ear pain, rhinorrhea, sinus pressure and sore throat  Eyes: Negative for discharge, redness and itching  Respiratory: Positive for cough and shortness of breath  Negative for wheezing  Cardiovascular: Negative for chest pain and palpitations  Rapid or slow heart rate   Gastrointestinal: Negative for diarrhea, nausea and vomiting  Objective:  Vitals:    03/27/23 0905   BP: 120/80   BP Location: Left arm   Patient Position: Sitting   Cuff Size: Standard   Pulse: 64   Temp: (!) 97 3 °F (36 3 °C)   TempSrc: Tympanic   SpO2: 99%   Weight: 59 kg (130 lb)   Height: 5' 7\" (1 702 m)      Physical Exam  Vitals reviewed  Constitutional:       General: She is not in acute distress  Appearance: She is well-developed  She is not ill-appearing  HENT:      Head: Normocephalic and atraumatic  Right Ear: Tympanic membrane and external ear normal  No drainage  Left Ear: Tympanic membrane normal  No drainage  Nose: No congestion        Mouth/Throat:      Pharynx: No oropharyngeal " exudate or posterior oropharyngeal erythema  Eyes:      General:         Right eye: No discharge  Left eye: No discharge  Extraocular Movements: Extraocular movements intact  Conjunctiva/sclera: Conjunctivae normal       Pupils: Pupils are equal, round, and reactive to light  Neck:      Thyroid: No thyromegaly  Cardiovascular:      Rate and Rhythm: Normal rate  Rhythm irregular  Heart sounds: Normal heart sounds  Pulmonary:      Effort: Pulmonary effort is normal  No respiratory distress  Breath sounds: Examination of the right-middle field reveals decreased breath sounds  Examination of the left-middle field reveals decreased breath sounds  Examination of the right-lower field reveals decreased breath sounds  Examination of the left-lower field reveals decreased breath sounds  Decreased breath sounds present  No wheezing, rhonchi or rales  Musculoskeletal:      Cervical back: Normal range of motion and neck supple  Lymphadenopathy:      Cervical: No cervical adenopathy  Skin:     Findings: No rash  Neurological:      Mental Status: She is alert  Assessment/Plan:    No problem-specific Assessment & Plan notes found for this encounter  Diagnoses and all orders for this visit:    Annual physical exam    Colon cancer screening  -     Cologuard    Other emphysema ([address] )  -     budesonide-formoterol (Symbicort) 160-4 5 mcg/act inhaler; Inhale 2 puffs 2 (two) times a day Rinse mouth after use  Cigarette nicotine dependence without complication    Frequent PVCs  -     metoprolol succinate (Toprol XL) 25 mg 24 hr tablet; Take 1 tablet (25 mg total) by mouth daily    PAC (premature atrial contraction)  -     metoprolol succinate (Toprol XL) 25 mg 24 hr tablet; Take 1 tablet (25 mg total) by mouth daily        Continue current inhaler and beta-blocker for control of PVCs  Continue to taper your last few cigarettes per day  Recheck back here in 3 months    No blood work needed prior to that

## 2023-03-27 NOTE — PATIENT INSTRUCTIONS
Continue current inhaler and beta-blocker for control of PVCs  Continue to taper your last few cigarettes per day  Recheck back here in 3 months  No blood work needed prior to that  Wellness Visit for Adults   AMBULATORY CARE:   A wellness visit  is when you see your healthcare provider to get screened for health problems  Your healthcare provider will also give you advice on how to stay healthy  Write down your questions so you remember to ask them  Ask your healthcare provider how often you should have a wellness visit  What happens at a wellness visit:  Your healthcare provider will ask about your health, and your family history of health problems  This includes high blood pressure, heart disease, and cancer  He or she will ask if you have symptoms that concern you, if you smoke, and about your mood  You may also be asked about your intake of medicines, supplements, food, and alcohol  Any of the following may be done: Your weight  will be checked  Your height may also be checked so your body mass index (BMI) can be calculated  Your BMI shows if you are at a healthy weight  Your blood pressure  and heart rate will be checked  Your temperature may also be checked  Blood and urine tests  may be done  Blood tests may be done to check your cholesterol levels  Abnormal cholesterol levels increase your risk for heart disease and stroke  You may also need a blood or urine test to check for diabetes if you are at increased risk  Urine tests may be done to look for signs of an infection or kidney disease  A physical exam  includes checking your heartbeat and lungs with a stethoscope  Your healthcare provider may also check your skin to look for sun damage  Screening tests  may be recommended  A screening test is done to check for diseases that may not cause symptoms  The screening tests you may need depend on your age, gender, family history, and lifestyle habits   For example, colorectal screening may be recommended if you are 48years old or older  Screening tests you need if you are a woman:   A Pap smear  is used to screen for cervical cancer  Pap smears are usually done every 3 to 5 years depending on your age  You may need them more often if you have had abnormal Pap smear test results in the past  Ask your healthcare provider how often you should have a Pap smear  A mammogram  is an x-ray of your breasts to screen for breast cancer  Experts recommend mammograms every 2 years starting at age 48 years  You may need a mammogram at age 52 years or younger if you have an increased risk for breast cancer  Talk to your healthcare provider about when you should start having mammograms and how often you need them  Vaccines you may need:   Get an influenza vaccine  every year  The influenza vaccine protects you from the flu  Several types of viruses cause the flu  The viruses change over time, so new vaccines are made each year  Get a tetanus-diphtheria (Td) booster vaccine  every 10 years  This vaccine protects you against tetanus and diphtheria  Tetanus is a severe infection that may cause painful muscle spasms and lockjaw  Diphtheria is a severe bacterial infection that causes a thick covering in the back of your mouth and throat  Get a human papillomavirus (HPV) vaccine  if you are female and aged 23 to 32 or male 23 to 24 and never received it  This vaccine protects you from HPV infection  HPV is the most common infection spread by sexual contact  HPV may also cause vaginal, penile, and anal cancers  Get a pneumococcal vaccine  if you are aged 72 years or older  The pneumococcal vaccine is an injection given to protect you from pneumococcal disease  Pneumococcal disease is an infection caused by pneumococcal bacteria  The infection may cause pneumonia, meningitis, or an ear infection  Get a shingles vaccine  if you are 60 or older, even if you have had shingles before   The shingles vaccine is an injection to protect you from the varicella-zoster virus  This is the same virus that causes chickenpox  Shingles is a painful rash that develops in people who had chickenpox or have been exposed to the virus  How to eat healthy:  My Plate is a model for planning healthy meals  It shows the types and amounts of foods that should go on your plate  Fruits and vegetables make up about half of your plate, and grains and protein make up the other half  A serving of dairy is included on the side of your plate  The amount of calories and serving sizes you need depends on your age, gender, weight, and height  Examples of healthy foods are listed below:  Eat a variety of vegetables  such as dark green, red, and orange vegetables  You can also include canned vegetables low in sodium (salt) and frozen vegetables without added butter or sauces  Eat a variety of fresh fruits , canned fruit in 100% juice, frozen fruit, and dried fruit  Include whole grains  At least half of the grains you eat should be whole grains  Examples include whole-wheat bread, wheat pasta, brown rice, and whole-grain cereals such as oatmeal     Eat a variety of protein foods such as seafood (fish and shellfish), lean meat, and poultry without skin (turkey and chicken)  Examples of lean meats include pork leg, shoulder, or tenderloin, and beef round, sirloin, tenderloin, and extra lean ground beef  Other protein foods include eggs and egg substitutes, beans, peas, soy products, nuts, and seeds  Choose low-fat dairy products such as skim or 1% milk or low-fat yogurt, cheese, and cottage cheese  Limit unhealthy fats  such as butter, hard margarine, and shortening  Exercise:  Exercise at least 30 minutes per day on most days of the week  Some examples of exercise include walking, biking, dancing, and swimming   You can also fit in more physical activity by taking the stairs instead of the elevator or parking farther away from stores  Include muscle strengthening activities 2 days each week  Regular exercise provides many health benefits  It helps you manage your weight, and decreases your risk for type 2 diabetes, heart disease, stroke, and high blood pressure  Exercise can also help improve your mood  Ask your healthcare provider about the best exercise plan for you  General health and safety guidelines:   Do not smoke  Nicotine and other chemicals in cigarettes and cigars can cause lung damage  Ask your healthcare provider for information if you currently smoke and need help to quit  E-cigarettes or smokeless tobacco still contain nicotine  Talk to your healthcare provider before you use these products  Limit alcohol  A drink of alcohol is 12 ounces of beer, 5 ounces of wine, or 1½ ounces of liquor  Lose weight, if needed  Being overweight increases your risk of certain health conditions  These include heart disease, high blood pressure, type 2 diabetes, and certain types of cancer  Protect your skin  Do not sunbathe or use tanning beds  Use sunscreen with a SPF 15 or higher  Apply sunscreen at least 15 minutes before you go outside  Reapply sunscreen every 2 hours  Wear protective clothing, hats, and sunglasses when you are outside  Drive safely  Always wear your seatbelt  Make sure everyone in your car wears a seatbelt  A seatbelt can save your life if you are in an accident  Do not use your cell phone when you are driving  This could distract you and cause an accident  Pull over if you need to make a call or send a text message  Practice safe sex  Use latex condoms if are sexually active and have more than one partner  Your healthcare provider may recommend screening tests for sexually transmitted infections (STIs)  Wear helmets, lifejackets, and protective gear  Always wear a helmet when you ride a bike or motorcycle, go skiing, or play sports that could cause a head injury   Wear protective equipment when you play sports  Wear a lifejacket when you are on a boat or doing water sports  © Copyright [employer] [place] 2022 Information is for End User's use only and may not be sold, redistributed or otherwise used for commercial purposes  The above information is an  only  It is not intended as medical advice for individual conditions or treatments  Talk to your doctor, nurse or pharmacist before following any medical regimen to see if it is safe and effective for you  Cigarette Smoking and Your Health   AMBULATORY CARE:   Risks to your health if you smoke:  Nicotine and other chemicals found in tobacco and e-cigarettes can damage every cell in your body  Even if you are a light smoker, you have an increased risk for cancer, heart disease, and lung disease  If you are pregnant or have diabetes, smoking increases your risk for complications  Nicotine can affect an adolescent's developing brain  This can lead to trouble thinking, learning, or paying attention  Benefits to your health if you stop smoking: You decrease respiratory symptoms such as coughing, wheezing, and shortness of breath  You reduce your risk for cancers of the lung, mouth, throat, kidney, bladder, pancreas, stomach, and cervix  If you already have cancer, you increase the benefits of chemotherapy  You also reduce your risk for cancer returning or a second cancer from developing  You reduce your risk for heart disease, blood clots, heart attack, and stroke  You reduce your risk for lung infections, and diseases such as pneumonia, asthma, chronic bronchitis, and emphysema  Your circulation improves  More oxygen can be delivered to your body  If you have diabetes, you lower your risk for complications, such as kidney, artery, and eye diseases  You also lower your risk for nerve damage  Nerve damage can lead to amputations, poor vision, and blindness  You improve your body's ability to heal and to fight infections      An adolescent can help his or her brain and body develop in a healthy way  Talk to your adolescent about all the health risks of nicotine  If you can, start talking about nicotine when your child is younger than 12 years  This may make it easier for him or her not to start using nicotine as a teenager or adult  Explain to him or her that it is best never to start  It can be hard to try to quit later  Benefits to the health of others if you stop smoking:  Tobacco is harmful to nonsmokers who breathe in your secondhand smoke  The following are ways the health of others around you may improve when you stop smoking: You lower the risks for lung cancer and heart disease in nonsmoking adults  If you are pregnant, you lower the risk for miscarriage, early delivery, low birth weight, and stillbirth  You also lower your baby's risk for SIDS, obesity, developmental delay, and neurobehavioral problems, such as ADHD  If you have children, you lower their risk for ear infections, colds, pneumonia, bronchitis, and asthma  Follow up with your doctor as directed:  Write down your questions so you remember to ask them during your visits  For support and more information:   [employer]  [address] [place] [address],[address]  [address]  Phone: [facility]  Phone: 2- 285 - 910-9279  Web Address: [facility]    Smokefree  gov  Phone: 4- 433 - 007-6017  Web Address: www smokefree  gov  © Copyright [employer] 2022 Information is for End User's use only and may not be sold, redistributed or otherwise used for commercial purposes  The above information is an  only  It is not intended as medical advice for individual conditions or treatments  Talk to your doctor, nurse or pharmacist before following any medical regimen to see if it is safe and effective for you

## 2023-03-27 NOTE — PROGRESS NOTES
[address]    NAME: Tammy Syed  AGE: 57 y o  SEX: female  : 1960     DATE: 3/27/2023     Assessment and Plan:     Problem List Items Addressed This Visit    None  Visit Diagnoses     Annual physical exam    -  Primary          Immunizations and preventive care screenings were discussed with patient today  Appropriate education was printed on patient's after visit summary  Counseling:  Alcohol/drug use: discussed moderation in alcohol intake, the recommendations for healthy alcohol use, and avoidance of illicit drug use  Dental Health: discussed importance of regular tooth brushing, flossing, and dental visits  · Exercise: the importance of regular exercise/physical activity was discussed  Recommend exercise 3-5 times per week for at least 30 minutes  Depression Screening and Follow-up Plan: Patient was screened for depression during today's encounter  They screened negative with a PHQ-2 score of 0  Tobacco Cessation Counseling: Tobacco cessation counseling was provided  The patient is sincerely urged to quit consumption of tobacco  She is ready to quit tobacco  Patient has cut down significant  Yesterday only had 2 cigarettes  Finds the Symbicort does help with her symptoms  No follow-ups on file  Chief Complaint:     Chief Complaint   Patient presents with   • Physical Exam     Review labs      History of Present Illness:     Adult Annual Physical   Patient here for a comprehensive physical exam  The patient reports see list     Diet and Physical Activity  · Diet/Nutrition: well balanced diet, limited junk food and consuming 3-5 servings of fruits/vegetables daily  · Exercise: walking        Depression Screening  PHQ-2/9 Depression Screening    Little interest or pleasure in doing things: 0 - not at all  Feeling down, depressed, or hopeless: 0 - not at all  PHQ-2 Score: 0  PHQ-2 Interpretation: Negative depression screen       General Health  · Sleep: sleeps poorly  · Hearing: normal - bilateral   · Vision: wears glasses  · Dental: dentures  /GYN Health  · Patient is: postmenopausal  · Last menstrual period: -  · Contraceptive method: n/a  Review of Systems:     Review of Systems   Constitutional: Negative for activity change, appetite change, diaphoresis and fatigue  HENT: Negative for congestion, sinus pressure and sore throat  Respiratory: Negative for cough, chest tightness, shortness of breath and wheezing  Cardiovascular: Negative for chest pain, palpitations and leg swelling  Fast or slow heart rate   Gastrointestinal: Negative for abdominal pain, blood in stool, constipation, diarrhea, nausea and vomiting  Genitourinary: Negative for difficulty urinating, dysuria, frequency and hematuria  Musculoskeletal: Negative for arthralgias, gait problem, joint swelling and myalgias  Neurological: Negative for dizziness, light-headedness and headaches  Psychiatric/Behavioral: Negative for agitation, confusion, dysphoric mood and sleep disturbance  The patient is not nervous/anxious         Past Medical History:     Past Medical History:   Diagnosis Date   • Carpal tunnel syndrome    • No known health problems    • Septic arthritis ([facility] [address] )       Past Surgical History:     Past Surgical History:   Procedure Laterality Date   • COLONOSCOPY     • HYSTERECTOMY     • OOPHORECTOMY     • ME NDSC WRST SURG W/RLS TRANSVRS CARPL LIGM Left 12/23/2021    Procedure: Left endoscopic carpal tunnel release;  Surgeon: Gerlene Soulier, MD;  Location:  MAIN OR;  Service: Orthopedics   • SHOULDER ARTHROSCOPY Left 12/3/2019    Procedure: ARTHROSCOPY SHOULDER;  Surgeon: Juan M Royal MD;  Location:  MAIN OR;  Service: Orthopedics      Social History:     Social History     Socioeconomic History   • Marital status: /Civil Union     Spouse name: None   • Number of "children: None   • Years of education: None   • Highest education level: None   Occupational History   • None   Tobacco Use   • Smoking status: Every Day     Packs/day: 0 50     Years: 45 00     Pack years: 22 50     Types: Cigarettes     Start date: 1   • Smokeless tobacco: Never   Vaping Use   • Vaping Use: Never used   Substance and Sexual Activity   • Alcohol use: Not Currently   • Drug use: No   • Sexual activity: Yes     Partners: Male   Other Topics Concern   • None   Social History Narrative   • None     Social Determinants of Health     Financial Resource Strain: Not on file   Food Insecurity: Not on file   Transportation Needs: Not on file   Physical Activity: Not on file   Stress: Not on file   Social Connections: Not on file   Intimate Partner Violence: Not on file   Housing Stability: Not on file      Family History:     Family History   Adopted: Yes   Problem Relation Age of Onset   • No Known Problems Daughter    • No Known Problems Daughter    • No Known Problems Daughter    • No Known Problems Daughter       Current Medications:     Current Outpatient Medications   Medication Sig Dispense Refill   • budesonide-formoterol (Symbicort) 160-4 5 mcg/act inhaler Inhale 2 puffs 2 (two) times a day Rinse mouth after use  10 2 g 0   • metoprolol succinate (Toprol XL) 25 mg 24 hr tablet Take 1 tablet (25 mg total) by mouth daily 30 tablet 1     No current facility-administered medications for this visit  Allergies: Allergies   Allergen Reactions   • Iodinated Contrast Media Hives   • Penicillins Hives      Physical Exam:     /80 (BP Location: Left arm, Patient Position: Sitting, Cuff Size: Standard)   Pulse 64   Temp (!) 97 3 °F (36 3 °C) (Tympanic)   Ht 5' 7\" (1 702 m)   Wt 59 kg (130 lb)   SpO2 99%   BMI 20 36 kg/m²     Physical Exam  Vitals and nursing note reviewed  Constitutional:       General: She is not in acute distress  Appearance: She is not ill-appearing     HENT:      " Head: Normocephalic and atraumatic  Right Ear: Tympanic membrane, ear canal and external ear normal       Left Ear: Tympanic membrane, ear canal and external ear normal       Nose: Nose normal       Mouth/Throat:      Pharynx: No posterior oropharyngeal erythema  Eyes:      General:         Right eye: No discharge  Left eye: No discharge  Extraocular Movements: Extraocular movements intact  Conjunctiva/sclera: Conjunctivae normal       Pupils: Pupils are equal, round, and reactive to light  Neck:      Thyroid: No thyromegaly  Vascular: No carotid bruit  Trachea: No tracheal deviation  Cardiovascular:      Rate and Rhythm: Normal rate  Rhythm irregular  Heart sounds: Normal heart sounds  No murmur heard  Pulmonary:      Effort: Pulmonary effort is normal  No respiratory distress  Breath sounds: Normal breath sounds  No wheezing  Abdominal:      General: Bowel sounds are normal  There is no distension  Palpations: Abdomen is soft  There is no mass  Tenderness: There is no abdominal tenderness  There is no guarding  Musculoskeletal:      Cervical back: Normal range of motion and neck supple  Right lower leg: No edema  Left lower leg: No edema  Lymphadenopathy:      Cervical: No cervical adenopathy  Skin:     Findings: No rash  Neurological:      General: No focal deficit present  Mental Status: She is alert and oriented to person, place, and time  Cranial Nerves: No cranial nerve deficit        Deep Tendon Reflexes: Reflexes normal    Psychiatric:         Mood and Affect: Mood normal          Behavior: Behavior normal           Manju Wallis MD  [id number]

## 2023-04-23 DIAGNOSIS — J43.8 OTHER EMPHYSEMA (HCC): ICD-10-CM

## 2023-04-24 RX ORDER — BUDESONIDE AND FORMOTEROL FUMARATE DIHYDRATE 160; 4.5 UG/1; UG/1
2 AEROSOL RESPIRATORY (INHALATION) 2 TIMES DAILY
Qty: 10.2 G | Refills: 0 | Status: SHIPPED | OUTPATIENT
Start: 2023-04-24

## 2023-05-17 LAB — COLOGUARD RESULT REPORTABLE: POSITIVE

## 2023-05-18 ENCOUNTER — TELEPHONE (OUTPATIENT)
Dept: FAMILY MEDICINE CLINIC | Facility: CLINIC | Age: 63
End: 2023-05-18

## 2023-05-18 NOTE — TELEPHONE ENCOUNTER
----- Message from Robby Fischer MD sent at 5/18/2023 12:36 PM EDT -----  Call patient with lab result-Cologuard positive  Refer to BIBI Singh-CBC in February was okay  She will need to have colonoscopy due to positive Cologuard

## 2023-05-30 DIAGNOSIS — J43.8 OTHER EMPHYSEMA (HCC): ICD-10-CM

## 2023-05-30 RX ORDER — BUDESONIDE AND FORMOTEROL FUMARATE DIHYDRATE 160; 4.5 UG/1; UG/1
2 AEROSOL RESPIRATORY (INHALATION) 2 TIMES DAILY
Qty: 10.2 G | Refills: 0 | Status: SHIPPED | OUTPATIENT
Start: 2023-05-30

## 2023-07-06 DIAGNOSIS — J43.8 OTHER EMPHYSEMA (HCC): ICD-10-CM

## 2023-07-06 RX ORDER — BUDESONIDE AND FORMOTEROL FUMARATE DIHYDRATE 160; 4.5 UG/1; UG/1
2 AEROSOL RESPIRATORY (INHALATION) 2 TIMES DAILY
Qty: 10.2 G | Refills: 0 | Status: SHIPPED | OUTPATIENT
Start: 2023-07-06

## 2023-07-15 NOTE — ASSESSMENT & PLAN NOTE
Patient developed acute right wrist pain with signs of inflammation that required hospital admission  She had mild leukocytosis on admission  Her blood culture showed no growth, ESR, CRP and procalcitonin levels were normal     She was seen in consultation by Orthopedics and Infectious Disease  Infectious disease felt that this was not infectious arthritis  Patient was started on steroids that resulted in marked improvement in her wrist pain and swelling  Patient had MRI of the right wrist that was abnormal   I discussed the case with Dr Jesús Goldsmith who felt that patient should follow up with Ortho as an outpatient in 1 week  Patient had minimal tenderness physical examination on the palmar aspect of the right wrist which was much improved compared to admission she claimed     I discharged her on prednisone 20 mg daily for 2 more days with instructions to follow up with Ortho as an outpatient in 1 week You can access the FollowMyHealth Patient Portal offered by [facility] by registering at the following website: [email or web address]. By joining [employer]’s FollowMyHealth portal, you will also be able to view your health information using other applications (apps) compatible with our system.

## 2023-08-15 DIAGNOSIS — R91.8 ABNORMAL CT LUNG SCREENING: Primary | ICD-10-CM

## 2023-08-21 ENCOUNTER — PATIENT MESSAGE (OUTPATIENT)
Dept: FAMILY MEDICINE CLINIC | Facility: CLINIC | Age: 63
End: 2023-08-21

## 2023-08-21 DIAGNOSIS — J43.8 OTHER EMPHYSEMA (HCC): ICD-10-CM

## 2023-08-21 DIAGNOSIS — I49.3 FREQUENT PVCS: ICD-10-CM

## 2023-08-21 DIAGNOSIS — I49.1 PAC (PREMATURE ATRIAL CONTRACTION): ICD-10-CM

## 2023-08-21 RX ORDER — METOPROLOL SUCCINATE 25 MG/1
25 TABLET, EXTENDED RELEASE ORAL DAILY
Qty: 90 TABLET | Refills: 3 | Status: SHIPPED | OUTPATIENT
Start: 2023-08-21

## 2023-08-21 RX ORDER — BUDESONIDE AND FORMOTEROL FUMARATE DIHYDRATE 160; 4.5 UG/1; UG/1
2 AEROSOL RESPIRATORY (INHALATION) 2 TIMES DAILY
Qty: 10.2 G | Refills: 0 | Status: SHIPPED | OUTPATIENT
Start: 2023-08-21

## 2023-10-24 ENCOUNTER — TELEPHONE (OUTPATIENT)
Dept: GASTROENTEROLOGY | Facility: CLINIC | Age: 63
End: 2023-10-24

## 2023-10-24 NOTE — TELEPHONE ENCOUNTER
This patient had a no-show for her appt, said she would contact us when she wants to reschedule.  SANDRINE 10/24

## 2023-12-11 ENCOUNTER — RA CDI HCC (OUTPATIENT)
Dept: OTHER | Facility: HOSPITAL | Age: 63
End: 2023-12-11

## 2023-12-19 ENCOUNTER — OFFICE VISIT (OUTPATIENT)
Dept: FAMILY MEDICINE CLINIC | Facility: CLINIC | Age: 63
End: 2023-12-19
Payer: MEDICARE

## 2023-12-19 VITALS
WEIGHT: 134 LBS | HEART RATE: 72 BPM | DIASTOLIC BLOOD PRESSURE: 80 MMHG | SYSTOLIC BLOOD PRESSURE: 136 MMHG | BODY MASS INDEX: 21.03 KG/M2 | HEIGHT: 67 IN | TEMPERATURE: 97.6 F

## 2023-12-19 DIAGNOSIS — I49.3 FREQUENT PVCS: ICD-10-CM

## 2023-12-19 DIAGNOSIS — E44.0 MODERATE PROTEIN-CALORIE MALNUTRITION (HCC): ICD-10-CM

## 2023-12-19 DIAGNOSIS — R22.0 FACIAL MASS: ICD-10-CM

## 2023-12-19 DIAGNOSIS — J43.8 OTHER EMPHYSEMA (HCC): ICD-10-CM

## 2023-12-19 DIAGNOSIS — I49.1 PAC (PREMATURE ATRIAL CONTRACTION): ICD-10-CM

## 2023-12-19 DIAGNOSIS — Z00.00 MEDICARE ANNUAL WELLNESS VISIT, SUBSEQUENT: Primary | ICD-10-CM

## 2023-12-19 PROCEDURE — G0402 INITIAL PREVENTIVE EXAM: HCPCS

## 2023-12-19 PROCEDURE — 99214 OFFICE O/P EST MOD 30 MIN: CPT

## 2023-12-19 RX ORDER — BUDESONIDE AND FORMOTEROL FUMARATE DIHYDRATE 160; 4.5 UG/1; UG/1
2 AEROSOL RESPIRATORY (INHALATION) 2 TIMES DAILY
Qty: 10.2 G | Refills: 0 | Status: SHIPPED | OUTPATIENT
Start: 2023-12-19

## 2023-12-19 RX ORDER — METOPROLOL SUCCINATE 25 MG/1
25 TABLET, EXTENDED RELEASE ORAL DAILY
Qty: 90 TABLET | Refills: 3 | Status: SHIPPED | OUTPATIENT
Start: 2023-12-19

## 2023-12-19 RX ORDER — ALBUTEROL SULFATE 90 UG/1
2 AEROSOL, METERED RESPIRATORY (INHALATION) EVERY 6 HOURS PRN
Qty: 6.7 G | Refills: 5 | Status: SHIPPED | OUTPATIENT
Start: 2023-12-19

## 2023-12-19 NOTE — PATIENT INSTRUCTIONS
Medicare Preventive Visit Patient Instructions  Thank you for completing your Welcome to Medicare Visit or Medicare Annual Wellness Visit today. Your next wellness visit will be due in one year (12/19/2024).  The screening/preventive services that you may require over the next 5-10 years are detailed below. Some tests may not apply to you based off risk factors and/or age. Screening tests ordered at today's visit but not completed yet may show as past due. Also, please note that scanned in results may not display below.  Preventive Screenings:  Service Recommendations Previous Testing/Comments   Colorectal Cancer Screening  * Colonoscopy    * Fecal Occult Blood Test (FOBT)/Fecal Immunochemical Test (FIT)  * Fecal DNA/Cologuard Test  * Flexible Sigmoidoscopy Age: 45-75 years old   Colonoscopy: every 10 years (may be performed more frequently if at higher risk)  OR  FOBT/FIT: every 1 year  OR  Cologuard: every 3 years  OR  Sigmoidoscopy: every 5 years  Screening may be recommended earlier than age 45 if at higher risk for colorectal cancer. Also, an individualized decision between you and your healthcare provider will decide whether screening between the ages of 76-85 would be appropriate. Colonoscopy: 05/10/2023  FOBT/FIT: Not on file  Cologuard: 05/10/2023  Sigmoidoscopy: Not on file          Breast Cancer Screening Age: 40+ years old  Frequency: every 1-2 years  Not required if history of left and right mastectomy Mammogram: 04/11/2021        Cervical Cancer Screening Between the ages of 21-29, pap smear recommended once every 3 years.   Between the ages of 30-65, can perform pap smear with HPV co-testing every 5 years.   Recommendations may differ for women with a history of total hysterectomy, cervical cancer, or abnormal pap smears in past. Pap Smear: Not on file        Hepatitis C Screening Once for adults born between 1945 and 1965  More frequently in patients at high risk for Hepatitis C Hep C Antibody:  12/03/2019        Diabetes Screening 1-2 times per year if you're at risk for diabetes or have pre-diabetes Fasting glucose: 96 mg/dL (2/23/2020)  A1C: No results in last 5 years (No results in last 5 years)      Cholesterol Screening Once every 5 years if you don't have a lipid disorder. May order more often based on risk factors. Lipid panel: 09/12/2020          Other Preventive Screenings Covered by Medicare:  Abdominal Aortic Aneurysm (AAA) Screening: covered once if your at risk. You're considered to be at risk if you have a family history of AAA.  Lung Cancer Screening: covers low dose CT scan once per year if you meet all of the following conditions: (1) Age 55-77; (2) No signs or symptoms of lung cancer; (3) Current smoker or have quit smoking within the last 15 years; (4) You have a tobacco smoking history of at least 20 pack years (packs per day multiplied by number of years you smoked); (5) You get a written order from a healthcare provider.  Glaucoma Screening: covered annually if you're considered high risk: (1) You have diabetes OR (2) Family history of glaucoma OR (3)  aged 50 and older OR (4)  American aged 65 and older  Osteoporosis Screening: covered every 2 years if you meet one of the following conditions: (1) You're estrogen deficient and at risk for osteoporosis based off medical history and other findings; (2) Have a vertebral abnormality; (3) On glucocorticoid therapy for more than 3 months; (4) Have primary hyperparathyroidism; (5) On osteoporosis medications and need to assess response to drug therapy.   Last bone density test (DXA Scan): Not on file.  HIV Screening: covered annually if you're between the age of 15-65. Also covered annually if you are younger than 15 and older than 65 with risk factors for HIV infection. For pregnant patients, it is covered up to 3 times per pregnancy.    Immunizations:  Immunization Recommendations   Influenza Vaccine Annual  influenza vaccination during flu season is recommended for all persons aged >= 6 months who do not have contraindications   Pneumococcal Vaccine   * Pneumococcal conjugate vaccine = PCV13 (Prevnar 13), PCV15 (Vaxneuvance), PCV20 (Prevnar 20)  * Pneumococcal polysaccharide vaccine = PPSV23 (Pneumovax) Adults 19-63 yo with certain risk factors or if 65+ yo  If never received any pneumonia vaccine: recommend Prevnar 20 (PCV20)  Give PCV20 if previously received 1 dose of PCV13 or PPSV23   Hepatitis B Vaccine 3 dose series if at intermediate or high risk (ex: diabetes, end stage renal disease, liver disease)   Respiratory syncytial virus (RSV) Vaccine - COVERED BY MEDICARE PART D  * RSVPreF3 (Arexvy) CDC recommends that adults 60 years of age and older may receive a single dose of RSV vaccine using shared clinical decision-making (SCDM)   Tetanus (Td) Vaccine - COST NOT COVERED BY MEDICARE PART B Following completion of primary series, a booster dose should be given every 10 years to maintain immunity against tetanus. Td may also be given as tetanus wound prophylaxis.   Tdap Vaccine - COST NOT COVERED BY MEDICARE PART B Recommended at least once for all adults. For pregnant patients, recommended with each pregnancy.   Shingles Vaccine (Shingrix) - COST NOT COVERED BY MEDICARE PART B  2 shot series recommended in those 19 years and older who have or will have weakened immune systems or those 50 years and older     Health Maintenance Due:      Topic Date Due   • HIV Screening  Never done   • Breast Cancer Screening: Mammogram  04/11/2022   • Lung Cancer Screening  03/06/2024   • Colorectal Cancer Screening  05/10/2026   • Hepatitis C Screening  Completed     Immunizations Due:      Topic Date Due   • COVID-19 Vaccine (1) Never done   • Pneumococcal Vaccine: 65+ Years (2 - PCV) 09/29/2021   • Influenza Vaccine (1) 09/01/2023     Advance Directives   What are advance directives?  Advance directives are legal documents that  state your wishes and plans for medical care. These plans are made ahead of time in case you lose your ability to make decisions for yourself. Advance directives can apply to any medical decision, such as the treatments you want, and if you want to donate organs.   What are the types of advance directives?  There are many types of advance directives, and each state has rules about how to use them. You may choose a combination of any of the following:  Living will:  This is a written record of the treatment you want. You can also choose which treatments you do not want, which to limit, and which to stop at a certain time. This includes surgery, medicine, IV fluid, and tube feedings.   Durable power of  for healthcare (DPAHC):  This is a written record that states who you want to make healthcare choices for you when you are unable to make them for yourself. This person, called a proxy, is usually a family member or a friend. You may choose more than 1 proxy.  Do not resuscitate (DNR) order:  A DNR order is used in case your heart stops beating or you stop breathing. It is a request not to have certain forms of treatment, such as CPR. A DNR order may be included in other types of advance directives.  Medical directive:  This covers the care that you want if you are in a coma, near death, or unable to make decisions for yourself. You can list the treatments you want for each condition. Treatment may include pain medicine, surgery, blood transfusions, dialysis, IV or tube feedings, and a ventilator (breathing machine).  Values history:  This document has questions about your views, beliefs, and how you feel and think about life. This information can help others choose the care that you would choose.  Why are advance directives important?  An advance directive helps you control your care. Although spoken wishes may be used, it is better to have your wishes written down. Spoken wishes can be misunderstood, or not  followed. Treatments may be given even if you do not want them. An advance directive may make it easier for your family to make difficult choices about your care.   Cigarette Smoking and Your Health   Risks to your health if you smoke:  Nicotine and other chemicals found in tobacco damage every cell in your body. Even if you are a light smoker, you have an increased risk for cancer, heart disease, and lung disease. If you are pregnant or have diabetes, smoking increases your risk for complications.   Benefits to your health if you stop smoking:   You decrease respiratory symptoms such as coughing, wheezing, and shortness of breath.   You reduce your risk for cancers of the lung, mouth, throat, kidney, bladder, pancreas, stomach, and cervix. If you already have cancer, you increase the benefits of chemotherapy. You also reduce your risk for cancer returning or a second cancer from developing.   You reduce your risk for heart disease, blood clots, heart attack, and stroke.   You reduce your risk for lung infections, and diseases such as pneumonia, asthma, chronic bronchitis, and emphysema.  Your circulation improves. More oxygen can be delivered to your body. If you have diabetes, you lower your risk for complications, such as kidney, artery, and eye diseases. You also lower your risk for nerve damage. Nerve damage can lead to amputations, poor vision, and blindness.  You improve your body's ability to heal and to fight infections.  For more information and support to stop smoking:   Smokefree.gov  Phone: 2- 320 - 145-2225  Web Address: www.smokefree.gov     © Copyright [employer] 2018 Information is for End User's use only and may not be sold, redistributed or otherwise used for commercial purposes. All illustrations and images included in CareNotes® are the copyrighted property of [employer] Inc[employer] or [employer]

## 2023-12-19 NOTE — ASSESSMENT & PLAN NOTE
Patient reports SOB and wheezing with cold weather. Requests rescue inhaler. Encouraged to monitor rescue inhaler use as maintenance regimen may need to be adjusted if symptoms are not well-controlled. Consider pulm referral

## 2023-12-19 NOTE — ASSESSMENT & PLAN NOTE
"Circular, moveable mass to left face/neck near ear. States that it began as a \"grain of rice\" in her 20's and now has consistently increased in size. Ct soft tissue ordered. Ref ENT placed.   "

## 2023-12-19 NOTE — PROGRESS NOTES
" Assessment and Plan:     Problem List Items Addressed This Visit       Other emphysema (HCC)     Patient reports SOB and wheezing with cold weather. Requests rescue inhaler. Encouraged to monitor rescue inhaler use as maintenance regimen may need to be adjusted if symptoms are not well-controlled. Consider pulm referral         Relevant Medications    albuterol (ProAir HFA) 90 mcg/act inhaler    Frequent PVCs     Patient reports that symptoms are well controlled with beta blocker. Declines ref to cards.          Moderate protein-calorie malnutrition (HCC)    Facial mass     Circular, moveable mass to left face/neck near ear. States that it began as a \"grain of rice\" in her 20's and now has consistently increased in size. Ct soft tissue ordered. Ref ENT placed.          Relevant Orders    CT soft tissue neck wo contrast    Ambulatory Referral to Otolaryngology     Other Visit Diagnoses       Medicare annual wellness visit, subsequent    -  Primary            Depression Screening and Follow-up Plan: Patient was screened for depression during today's encounter. They screened negative with a PHQ-2 score of 0.    Urinary Incontinence Plan of Care: counseling topics discussed: practice Kegel (pelvic floor strengthening) exercises.       Preventive health issues were discussed with patient, and age appropriate screening tests were ordered as noted in patient's After Visit Summary.  Personalized health advice and appropriate referrals for health education or preventive services given if needed, as noted in patient's After Visit Summary.     History of Present Illness:     Patient presents for a Medicare Wellness Visit    Shortness of Breath  The current episode started more than 1 year ago. The problem occurs intermittently. The problem has been gradually worsening since onset. Associated symptoms include wheezing. Pertinent negatives include no chest pain, chest pressure, coughing, dizziness, fatigue, hoarseness of voice, " leg swelling, orthopnea, palpitations, rhinorrhea, sore throat, stridor or sweats. Exacerbated by: cold weather. There was no intake of a foreign body. Past treatments include beta-agonist inhalers. The treatment provided mild relief. She has been Behaving normally.      Patient Care Team:  SADIQ Medrano as PCP - General (Nurse Practitioner)  Danny Galarza MD as PCP - PCP-[facility] ([facility])  Danny Galarza MD as PCP - PCP-[facility] ([facility])     Review of Systems:     Review of Systems   Constitutional:  Negative for activity change and fatigue.   HENT:  Negative for congestion, ear pain, hoarse voice, rhinorrhea and sore throat.    Eyes:  Negative for pain.   Respiratory:  Positive for shortness of breath and wheezing. Negative for cough and stridor.    Cardiovascular:  Negative for chest pain, palpitations, orthopnea and leg swelling.   Gastrointestinal:  Negative for abdominal pain, diarrhea, nausea and vomiting.   Musculoskeletal:  Negative for arthralgias and myalgias.   Skin:  Negative for rash.   Neurological:  Negative for dizziness, weakness and numbness.   All other systems reviewed and are negative.       Problem List:     Patient Active Problem List   Diagnosis    Leukocytosis    Tobacco use    Cervical radiculopathy    Shoulder joint pain    Carpal tunnel syndrome    Wrist pain, acute, right    Cigarette nicotine dependence without complication    Other emphysema (HCC)    Frequent PVCs    PAC (premature atrial contraction)    Moderate protein-calorie malnutrition (HCC)    Facial mass      Past Medical and Surgical History:     Past Medical History:   Diagnosis Date    Carpal tunnel syndrome     No known health problems     Septic arthritis (HCC)      Past Surgical History:   Procedure Laterality Date    COLONOSCOPY      HYSTERECTOMY      OOPHORECTOMY      [facility] NDSC WRST SURG W/RLS TRANSVRS CARPL LIGM Left 12/23/2021    Procedure: Left endoscopic carpal tunnel release;  Surgeon:  Jarocho Godinez MD;  Location:  MAIN OR;  Service: Orthopedics    SHOULDER ARTHROSCOPY Left 12/3/2019    Procedure: ARTHROSCOPY SHOULDER;  Surgeon: Edwar Renee MD;  Location:  MAIN OR;  Service: Orthopedics      Family History:     Family History   Adopted: Yes   Problem Relation Age of Onset    No Known Problems Daughter     No Known Problems Daughter     No Known Problems Daughter     No Known Problems Daughter       Social History:     Social History     Socioeconomic History    Marital status: /Civil Union     Spouse name: None    Number of children: None    Years of education: None    Highest education level: None   Occupational History    None   Tobacco Use    Smoking status: Every Day     Current packs/day: 0.50     Average packs/day: 0.5 packs/day for 46.0 years (23.0 ttl pk-yrs)     Types: Cigarettes     Start date: 1978    Smokeless tobacco: Never   Vaping Use    Vaping status: Never Used   Substance and Sexual Activity    Alcohol use: Not Currently    Drug use: No    Sexual activity: Yes     Partners: Male   Other Topics Concern    None   Social History Narrative    None     Social Determinants of Health     Financial Resource Strain: Low Risk  (12/19/2023)    Overall Financial Resource Strain (CARDIA)     Difficulty of Paying Living Expenses: Not hard at all   Food Insecurity: Not on file   Transportation Needs: No Transportation Needs (12/19/2023)    PRAPARE - Transportation     Lack of Transportation (Medical): No     Lack of Transportation (Non-Medical): No   Physical Activity: Not on file   Stress: Not on file   Social Connections: Not on file   Intimate Partner Violence: Not on file   Housing Stability: Not on file      Medications and Allergies:     Current Outpatient Medications   Medication Sig Dispense Refill    albuterol (ProAir HFA) 90 mcg/act inhaler Inhale 2 puffs every 6 (six) hours as needed for wheezing 6.7 g 5    budesonide-formoterol (Symbicort) 160-4.5 mcg/act  inhaler Inhale 2 puffs 2 (two) times a day Rinse mouth after use. 10.2 g 0    metoprolol succinate (Toprol XL) 25 mg 24 hr tablet Take 1 tablet (25 mg total) by mouth daily 90 tablet 3     No current facility-administered medications for this visit.     Allergies   Allergen Reactions    Iodinated Contrast Media Hives    Penicillins Hives      Immunizations:     Immunization History   Administered Date(s) Administered    Influenza, recombinant, quadrivalent,injectable, preservative free 12/04/2019, 09/29/2020    Pneumococcal Polysaccharide PPV23 09/29/2020      Health Maintenance:         Topic Date Due    HIV Screening  Never done    Breast Cancer Screening: Mammogram  04/11/2022    Lung Cancer Screening  03/06/2024    Colorectal Cancer Screening  05/10/2026    Hepatitis C Screening  Completed         Topic Date Due    COVID-19 Vaccine (1) Never done    Pneumococcal Vaccine: 65+ Years (2 - PCV) 09/29/2021    Influenza Vaccine (1) 09/01/2023      Medicare Screening Tests and Risk Assessments:     Last Medicare Wellness visit information reviewed, patient interviewed and updates made to the record today.      Health Risk Assessment:   Patient rates overall health as good. Patient feels that their physical health rating is same. Patient is satisfied with their life. Eyesight was rated as same. Hearing was rated as same. Patient feels that their emotional and mental health rating is same. Patients states they are never, rarely angry. Patient states they are sometimes unusually tired/fatigued. Pain experienced in the last 7 days has been some. Patient's pain rating has been 5/10. Patient states that she has experienced no weight loss or gain in last 6 months.     Depression Screening:   PHQ-2 Score: 0  PHQ-9 Score: 0      Fall Risk Screening:   In the past year, patient has experienced: no history of falling in past year      Urinary Incontinence Screening:   Patient has leaked urine accidently in the last six months.      Home Safety:  Patient has trouble with stairs inside or outside of their home. Patient has working smoke alarms and has working carbon monoxide detector. Home safety hazards include: none.     Nutrition:   Current diet is Regular.     Medications:   Patient is not currently taking any over-the-counter supplements. Patient is able to manage medications.     Activities of Daily Living (ADLs)/Instrumental Activities of Daily Living (IADLs):   Walk and transfer into and out of bed and chair?: Yes  Dress and groom yourself?: Yes    Bathe or shower yourself?: Yes    Feed yourself? Yes  Do your laundry/housekeeping?: Yes  Manage your money, pay your bills and track your expenses?: Yes  Make your own meals?: Yes    Do your own shopping?: Yes    Previous Hospitalizations:   Any hospitalizations or ED visits within the last 12 months?: No      Advance Care Planning:   Living will: No    Durable POA for healthcare: No    Advanced directive: No    Advanced directive counseling given: Yes    ACP document given: Yes    Patient declined ACP directive: No    End of Life Decisions reviewed with patient: Yes    Provider agrees with end of life decisions: Yes      Cognitive Screening:   Provider or family/friend/caregiver concerned regarding cognition?: No    PREVENTIVE SCREENINGS      Cardiovascular Screening:    General: Screening Current      Diabetes Screening:     General: Screening Current      Colorectal Cancer Screening:     General: Screening Current      Cervical Cancer Screening:    General: Screening Not Indicated      Lung Cancer Screening:     General: Screening Current      Hepatitis C Screening:    General: Screening Current    Screening, Brief Intervention, and Referral to Treatment (SBIRT)    Screening      Single Item Drug Screening:  How often have you used an illegal drug (including marijuana) or a prescription medication for non-medical reasons in the past year? never    Single Item Drug Screen Score:  "0  Interpretation: Negative screen for possible drug use disorder    No results found.     Physical Exam:     /80 (BP Location: Left arm, Patient Position: Sitting, Cuff Size: Standard)   Pulse 72   Temp 97.6 °F (36.4 °C) (Tympanic)   Ht 5' 7\" (1.702 m)   Wt 60.8 kg (134 lb)   BMI 20.99 kg/m²     Physical Exam  Vitals and nursing note reviewed.   Constitutional:       General: She is not in acute distress.     Appearance: She is well-developed.   HENT:      Head: Normocephalic and atraumatic.   Eyes:      Conjunctiva/sclera: Conjunctivae normal.   Cardiovascular:      Rate and Rhythm: Normal rate and regular rhythm.      Heart sounds: No murmur heard.  Pulmonary:      Effort: Pulmonary effort is normal. No respiratory distress.      Breath sounds: Normal breath sounds.   Abdominal:      Palpations: Abdomen is soft.      Tenderness: There is no abdominal tenderness.   Musculoskeletal:         General: No swelling.      Cervical back: Neck supple.   Skin:     General: Skin is warm and dry.      Capillary Refill: Capillary refill takes less than 2 seconds.      Findings: Lesion present.          Neurological:      Mental Status: She is alert and oriented to person, place, and time.   Psychiatric:         Mood and Affect: Mood normal.          SADIQ Medrano  "

## 2023-12-19 NOTE — TELEPHONE ENCOUNTER
Message received via rx voicemail    Patient requests refills, patient is scheduled for appt today with Karolina BABCOCK.     Please review & fill at visit-pt due for AWV.

## 2024-01-04 DIAGNOSIS — J43.8 OTHER EMPHYSEMA (HCC): ICD-10-CM

## 2024-01-04 RX ORDER — BUDESONIDE AND FORMOTEROL FUMARATE DIHYDRATE 160; 4.5 UG/1; UG/1
2 AEROSOL RESPIRATORY (INHALATION) 2 TIMES DAILY
Qty: 10.2 G | Refills: 0 | Status: SHIPPED | OUTPATIENT
Start: 2024-01-04

## 2024-01-29 DIAGNOSIS — J43.8 OTHER EMPHYSEMA (HCC): ICD-10-CM

## 2024-01-30 RX ORDER — BUDESONIDE AND FORMOTEROL FUMARATE DIHYDRATE 160; 4.5 UG/1; UG/1
2 AEROSOL RESPIRATORY (INHALATION) 2 TIMES DAILY
Qty: 10.2 G | Refills: 0 | Status: SHIPPED | OUTPATIENT
Start: 2024-01-30

## 2024-03-02 DIAGNOSIS — J43.8 OTHER EMPHYSEMA (HCC): ICD-10-CM

## 2024-03-04 DIAGNOSIS — J43.8 OTHER EMPHYSEMA (HCC): ICD-10-CM

## 2024-03-04 RX ORDER — FLUTICASONE PROPIONATE AND SALMETEROL 250; 50 UG/1; UG/1
1 POWDER RESPIRATORY (INHALATION) 2 TIMES DAILY
Qty: 60 BLISTER | Refills: 5 | Status: SHIPPED | OUTPATIENT
Start: 2024-03-04 | End: 2024-08-31

## 2024-03-04 RX ORDER — ALBUTEROL SULFATE 90 UG/1
2 AEROSOL, METERED RESPIRATORY (INHALATION) EVERY 6 HOURS PRN
Qty: 6.7 G | Refills: 0 | Status: SHIPPED | OUTPATIENT
Start: 2024-03-04

## 2024-03-04 RX ORDER — BUDESONIDE AND FORMOTEROL FUMARATE DIHYDRATE 160; 4.5 UG/1; UG/1
2 AEROSOL RESPIRATORY (INHALATION) 2 TIMES DAILY
Qty: 10.2 G | Refills: 0 | Status: SHIPPED | OUTPATIENT
Start: 2024-03-04 | End: 2024-03-04

## 2025-04-30 ENCOUNTER — VBI (OUTPATIENT)
Dept: ADMINISTRATIVE | Facility: OTHER | Age: 65
End: 2025-04-30

## 2025-04-30 NOTE — TELEPHONE ENCOUNTER
Patient contacted to schedule Annual Wellness Visit.   A message was left for the patient to return the call.    Thank you.  Didi Villasenor MA  [facility]

## 2025-08-11 ENCOUNTER — VBI (OUTPATIENT)
Dept: ADMINISTRATIVE | Facility: OTHER | Age: 65
End: 2025-08-11

## 2025-08-21 PROBLEM — Z90.710 STATUS POST HYSTERECTOMY: Status: ACTIVE | Noted: 2025-08-21

## 2025-08-21 PROBLEM — N39.3 STRESS INCONTINENCE (FEMALE) (MALE): Status: ACTIVE | Noted: 2025-08-21

## 2025-08-21 PROBLEM — D23.30 OTHER BENIGN NEOPLASM OF SKIN OF UNSPECIFIED PART OF FACE: Status: ACTIVE | Noted: 2025-08-21

## 2025-08-21 PROBLEM — Z87.39 PERSONAL HISTORY OF OTHER DISEASES OF THE MUSCULOSKELETAL SYSTEM AND CONNECTIVE TISSUE: Status: ACTIVE | Noted: 2025-08-21

## 2025-08-21 PROBLEM — R06.09 DYSPNEA ON EXERTION: Status: ACTIVE | Noted: 2025-08-21

## 2025-08-21 PROBLEM — N39.41 URGE INCONTINENCE: Status: ACTIVE | Noted: 2025-08-21

## 2025-08-21 PROBLEM — I49.3 FREQUENT VENTRICULAR PREMATURE BEATS: Status: ACTIVE | Noted: 2025-08-21

## 2025-08-21 PROBLEM — J44.9 CHRONIC OBSTRUCTIVE PULMONARY DISEASE, UNSPECIFIED (HCC): Status: ACTIVE | Noted: 2025-08-21

## 2025-08-21 PROBLEM — R19.5 POSITIVE COLORECTAL CANCER SCREENING USING DNA-BASED STOOL TEST: Status: ACTIVE | Noted: 2025-08-21

## 2025-08-21 PROBLEM — R06.01 ORTHOPNEA: Status: ACTIVE | Noted: 2025-08-21

## 2025-08-21 PROBLEM — F17.210 NICOTINE DEPENDENCE, CIGARETTES, UNCOMPLICATED: Status: ACTIVE | Noted: 2025-08-21

## 2025-08-21 PROBLEM — M19.93: Status: ACTIVE | Noted: 2025-08-21

## (undated) DEVICE — GLOVE SRG BIOGEL ORTHOPEDIC 7.5

## (undated) DEVICE — ARM SLING: Brand: DEROYAL

## (undated) DEVICE — SUT PROLENE 4-0 PC-3 18 IN 8634G

## (undated) DEVICE — SYRINGE 3ML LL

## (undated) DEVICE — CLEAR-TRAC THREADED CANNULA WITH                                    OBTURATOR 5 MM X 76 MM, LATEX FREE,                                    BOX OF 10: Brand: CLEAR-TRAC

## (undated) DEVICE — GLOVE INDICATOR PI UNDERGLOVE SZ 8 BLUE

## (undated) DEVICE — PUDDLE VAC

## (undated) DEVICE — SLEEVE SUSPENSION SHOULDER STAR LAT TRAC VELCRO STRAP

## (undated) DEVICE — ARTHROSCOPY FLOOR MAT

## (undated) DEVICE — NEEDLE 18 G X 1 1/2

## (undated) DEVICE — STERILE BETHLEHEM PLASTIC HAND: Brand: CARDINAL HEALTH

## (undated) DEVICE — PACK MAJOR ORTHO W/SPLITS PBDS

## (undated) DEVICE — INTENDED FOR TISSUE SEPARATION, AND OTHER PROCEDURES THAT REQUIRE A SHARP SURGICAL BLADE TO PUNCTURE OR CUT.: Brand: BARD-PARKER SAFETY BLADES SIZE 15, STERILE

## (undated) DEVICE — SPONGE PVP SCRUB WING STERILE

## (undated) DEVICE — CHLORAPREP HI-LITE 26ML ORANGE

## (undated) DEVICE — PREP SURGICAL PURPREP 26ML

## (undated) DEVICE — INTENDED FOR TISSUE SEPARATION, AND OTHER PROCEDURES THAT REQUIRE A SHARP SURGICAL BLADE TO PUNCTURE OR CUT.: Brand: BARD-PARKER SAFETY BLADES SIZE 11, STERILE

## (undated) DEVICE — TUBING SUCTION 5MM X 12 FT

## (undated) DEVICE — STERILE COTTON TIPPED APPLICATORS: Brand: PURITAN

## (undated) DEVICE — NEEDLE SPINAL18G X 3.5 IN QUINCKE

## (undated) DEVICE — VAPR COOLPULSE 90 ELECTRODE 90 DEGREES SUCTION WITH INTEGRATED HANDPIECE: Brand: VAPR COOLPULSE

## (undated) DEVICE — GLOVE SRG BIOGEL 7.5

## (undated) DEVICE — FILTER STRAW 1.7

## (undated) DEVICE — TUBING ARTHROSCOPIC WAVE  MAIN PUMP

## (undated) DEVICE — RETROGRADE KNIFE BOX OF 6: Brand: ECTRA

## (undated) DEVICE — BLADE SHAVER EXCALIBUR 4MM 13CM COOLCUT